# Patient Record
Sex: MALE | Race: WHITE | NOT HISPANIC OR LATINO | Employment: FULL TIME | URBAN - METROPOLITAN AREA
[De-identification: names, ages, dates, MRNs, and addresses within clinical notes are randomized per-mention and may not be internally consistent; named-entity substitution may affect disease eponyms.]

---

## 2019-04-24 ENCOUNTER — HOSPITAL ENCOUNTER (OUTPATIENT)
Dept: FAMILY MEDICINE CLINIC | Facility: CLINIC | Age: 64
Setting detail: SPECIMEN
Discharge: HOME | End: 2019-04-24
Attending: NURSE PRACTITIONER | Admitting: NURSE PRACTITIONER

## 2019-04-24 LAB
ALBUMIN SERPL-MCNC: 3.7 G/DL (ref 3.5–4.8)
ALBUMIN/GLOB SERPL: 1.1 {RATIO} (ref 1–1.7)
ALP SERPL-CCNC: 59 IU/L (ref 32–91)
ALT SERPL-CCNC: 38 IU/L (ref 17–63)
ANION GAP SERPL CALC-SCNC: 14.4 MMOL/L (ref 10–20)
AST SERPL-CCNC: 33 IU/L (ref 15–41)
BILIRUB SERPL-MCNC: 0.5 MG/DL (ref 0.3–1.2)
BUN SERPL-MCNC: 13 MG/DL (ref 8–20)
BUN/CREAT SERPL: 11.8 (ref 6.2–20.3)
CALCIUM SERPL-MCNC: 9.2 MG/DL (ref 8.9–10.3)
CHLORIDE SERPL-SCNC: 104 MMOL/L (ref 101–111)
CHOLEST SERPL-MCNC: 218 MG/DL
CHOLEST/HDLC SERPL: 4.9 {RATIO}
CONV CO2: 24 MMOL/L (ref 22–32)
CONV LDL CHOLESTEROL DIRECT: 149 MG/DL (ref 0–100)
CONV TOTAL PROTEIN: 7.2 G/DL (ref 6.1–7.9)
CREAT UR-MCNC: 1.1 MG/DL (ref 0.7–1.2)
GLOBULIN UR ELPH-MCNC: 3.5 G/DL (ref 2.5–3.8)
GLUCOSE SERPL-MCNC: 97 MG/DL (ref 65–99)
HDLC SERPL-MCNC: 45 MG/DL
LDLC/HDLC SERPL: 3.3 {RATIO}
LIPID INTERPRETATION: ABNORMAL
POTASSIUM SERPL-SCNC: 4.4 MMOL/L (ref 3.6–5.1)
SODIUM SERPL-SCNC: 138 MMOL/L (ref 136–144)
TRIGL SERPL-MCNC: 179 MG/DL
VLDLC SERPL CALC-MCNC: 24.4 MG/DL

## 2019-06-28 ENCOUNTER — ON CAMPUS - OUTPATIENT (AMBULATORY)
Dept: URBAN - METROPOLITAN AREA HOSPITAL 2 | Facility: HOSPITAL | Age: 64
End: 2019-06-28

## 2019-06-28 ENCOUNTER — OFFICE (AMBULATORY)
Dept: URBAN - METROPOLITAN AREA PATHOLOGY 4 | Facility: PATHOLOGY | Age: 64
End: 2019-06-28

## 2019-06-28 VITALS
HEART RATE: 82 BPM | HEART RATE: 81 BPM | WEIGHT: 178 LBS | OXYGEN SATURATION: 95 % | DIASTOLIC BLOOD PRESSURE: 87 MMHG | HEIGHT: 66 IN | RESPIRATION RATE: 16 BRPM | DIASTOLIC BLOOD PRESSURE: 81 MMHG | DIASTOLIC BLOOD PRESSURE: 102 MMHG | DIASTOLIC BLOOD PRESSURE: 65 MMHG | SYSTOLIC BLOOD PRESSURE: 128 MMHG | SYSTOLIC BLOOD PRESSURE: 109 MMHG | RESPIRATION RATE: 18 BRPM | HEART RATE: 72 BPM | TEMPERATURE: 97.4 F | DIASTOLIC BLOOD PRESSURE: 71 MMHG | HEART RATE: 70 BPM | DIASTOLIC BLOOD PRESSURE: 80 MMHG | SYSTOLIC BLOOD PRESSURE: 136 MMHG | SYSTOLIC BLOOD PRESSURE: 130 MMHG | HEART RATE: 80 BPM | DIASTOLIC BLOOD PRESSURE: 91 MMHG | SYSTOLIC BLOOD PRESSURE: 126 MMHG | OXYGEN SATURATION: 98 % | HEART RATE: 75 BPM | HEART RATE: 66 BPM | SYSTOLIC BLOOD PRESSURE: 153 MMHG | SYSTOLIC BLOOD PRESSURE: 146 MMHG | DIASTOLIC BLOOD PRESSURE: 73 MMHG | SYSTOLIC BLOOD PRESSURE: 151 MMHG | OXYGEN SATURATION: 97 % | HEART RATE: 65 BPM | OXYGEN SATURATION: 96 %

## 2019-06-28 DIAGNOSIS — D12.3 BENIGN NEOPLASM OF TRANSVERSE COLON: ICD-10-CM

## 2019-06-28 DIAGNOSIS — Z86.010 PERSONAL HISTORY OF COLONIC POLYPS: ICD-10-CM

## 2019-06-28 DIAGNOSIS — Z85.038 PERSONAL HISTORY OF OTHER MALIGNANT NEOPLASM OF LARGE INTEST: ICD-10-CM

## 2019-06-28 DIAGNOSIS — D12.2 BENIGN NEOPLASM OF ASCENDING COLON: ICD-10-CM

## 2019-06-28 LAB
GI HISTOLOGY: A. UNSPECIFIED: (no result)
GI HISTOLOGY: B. UNSPECIFIED: (no result)
GI HISTOLOGY: PDF REPORT: (no result)

## 2019-06-28 PROCEDURE — 45385 COLONOSCOPY W/LESION REMOVAL: CPT | Mod: 33 | Performed by: INTERNAL MEDICINE

## 2019-06-28 PROCEDURE — 88305 TISSUE EXAM BY PATHOLOGIST: CPT | Mod: 33 | Performed by: INTERNAL MEDICINE

## 2019-07-01 PROBLEM — Z86.010 HISTORY OF COLONIC POLYPS: Status: ACTIVE | Noted: 2019-04-24

## 2019-07-01 PROBLEM — Z86.0100 HISTORY OF COLONIC POLYPS: Status: ACTIVE | Noted: 2019-04-24

## 2019-07-01 PROBLEM — N40.2 PROSTATE NODULE: Status: ACTIVE | Noted: 2018-01-12

## 2019-07-01 PROBLEM — Z23 ENCOUNTER FOR IMMUNIZATION: Status: ACTIVE | Noted: 2019-04-24

## 2019-07-01 PROBLEM — E78.5 HYPERLIPIDEMIA: Status: ACTIVE | Noted: 2019-07-01

## 2019-07-01 RX ORDER — LOVASTATIN 40 MG/1
TABLET ORAL EVERY 12 HOURS
COMMUNITY
Start: 2019-02-06 | End: 2020-02-24

## 2019-07-01 RX ORDER — FLUTICASONE PROPIONATE 50 MCG
SPRAY, SUSPENSION (ML) NASAL
COMMUNITY
Start: 2019-03-26 | End: 2020-04-29 | Stop reason: SDUPTHER

## 2019-07-01 RX ORDER — MULTIVIT-MINERALS/FA/LYCOPENE 0.4 MG-6
TABLET ORAL
COMMUNITY
Start: 2016-09-21

## 2019-10-30 ENCOUNTER — APPOINTMENT (OUTPATIENT)
Dept: LAB | Facility: HOSPITAL | Age: 64
End: 2019-10-30

## 2019-10-30 ENCOUNTER — OFFICE VISIT (OUTPATIENT)
Dept: FAMILY MEDICINE CLINIC | Facility: CLINIC | Age: 64
End: 2019-10-30

## 2019-10-30 VITALS
HEIGHT: 66 IN | HEART RATE: 74 BPM | DIASTOLIC BLOOD PRESSURE: 83 MMHG | TEMPERATURE: 97.7 F | RESPIRATION RATE: 20 BRPM | OXYGEN SATURATION: 97 % | BODY MASS INDEX: 29.65 KG/M2 | WEIGHT: 184.5 LBS | SYSTOLIC BLOOD PRESSURE: 151 MMHG

## 2019-10-30 DIAGNOSIS — E55.9 VITAMIN D DEFICIENCY: ICD-10-CM

## 2019-10-30 DIAGNOSIS — N40.2 PROSTATE NODULE: ICD-10-CM

## 2019-10-30 DIAGNOSIS — Z23 FLU VACCINE NEED: ICD-10-CM

## 2019-10-30 DIAGNOSIS — Z23 NEED FOR PROPHYLACTIC VACCINATION AND INOCULATION AGAINST INFLUENZA: ICD-10-CM

## 2019-10-30 DIAGNOSIS — E78.5 HYPERLIPIDEMIA, UNSPECIFIED HYPERLIPIDEMIA TYPE: Primary | ICD-10-CM

## 2019-10-30 LAB
25(OH)D3 SERPL-MCNC: 86.7 NG/ML (ref 30–100)
ALBUMIN SERPL-MCNC: 4.3 G/DL (ref 3.5–5.2)
ALBUMIN/GLOB SERPL: 1.6 G/DL
ALP SERPL-CCNC: 63 U/L (ref 39–117)
ALT SERPL W P-5'-P-CCNC: 39 U/L (ref 1–41)
ANION GAP SERPL CALCULATED.3IONS-SCNC: 6.3 MMOL/L (ref 5–15)
AST SERPL-CCNC: 27 U/L (ref 1–40)
BILIRUB SERPL-MCNC: 0.2 MG/DL (ref 0.2–1.2)
BUN BLD-MCNC: 13 MG/DL (ref 8–23)
BUN/CREAT SERPL: 11 (ref 7–25)
CALCIUM SPEC-SCNC: 9.3 MG/DL (ref 8.6–10.5)
CHLORIDE SERPL-SCNC: 101 MMOL/L (ref 98–107)
CHOLEST SERPL-MCNC: 213 MG/DL (ref 0–200)
CO2 SERPL-SCNC: 29.7 MMOL/L (ref 22–29)
CREAT BLD-MCNC: 1.18 MG/DL (ref 0.76–1.27)
GFR SERPL CREATININE-BSD FRML MDRD: 63 ML/MIN/1.73
GLOBULIN UR ELPH-MCNC: 2.7 GM/DL
GLUCOSE BLD-MCNC: 97 MG/DL (ref 65–99)
HDLC SERPL-MCNC: 44 MG/DL (ref 40–60)
LDLC SERPL CALC-MCNC: 129 MG/DL (ref 0–100)
LDLC/HDLC SERPL: 2.94 {RATIO}
POTASSIUM BLD-SCNC: 4.6 MMOL/L (ref 3.5–5.2)
PROT SERPL-MCNC: 7 G/DL (ref 6–8.5)
SODIUM BLD-SCNC: 137 MMOL/L (ref 136–145)
TRIGL SERPL-MCNC: 199 MG/DL (ref 0–150)
VLDLC SERPL-MCNC: 39.8 MG/DL (ref 5–40)

## 2019-10-30 PROCEDURE — 99213 OFFICE O/P EST LOW 20 MIN: CPT | Performed by: NURSE PRACTITIONER

## 2019-10-30 PROCEDURE — 90674 CCIIV4 VAC NO PRSV 0.5 ML IM: CPT | Performed by: NURSE PRACTITIONER

## 2019-10-30 PROCEDURE — 36415 COLL VENOUS BLD VENIPUNCTURE: CPT | Performed by: NURSE PRACTITIONER

## 2019-10-30 PROCEDURE — 80061 LIPID PANEL: CPT | Performed by: NURSE PRACTITIONER

## 2019-10-30 PROCEDURE — 90471 IMMUNIZATION ADMIN: CPT | Performed by: NURSE PRACTITIONER

## 2019-10-30 PROCEDURE — 82306 VITAMIN D 25 HYDROXY: CPT | Performed by: NURSE PRACTITIONER

## 2019-10-30 PROCEDURE — 80053 COMPREHEN METABOLIC PANEL: CPT | Performed by: NURSE PRACTITIONER

## 2019-10-30 NOTE — PROGRESS NOTES
Subjective   Perry Tafoya is a 64 y.o. male.     Chief Complaint   Patient presents with   • Hyperlipidemia       HPI  Here for his 6 month follow up for his chronic medical problems.     Influenza vaccine he will get today. He did get one last year.   No hospitalizations no surgery since last seen.   He said recent PSA with urologist was elevated.he will be seen in 6 months instead of one year. No family history of prostate cancer. He has history of prostate nodule that is being followed.     Hyperlipidemia; taking lovastatin 40mg once day. He denies myalgias.  4/2019  Tri 179 hdl 45 . He believes this was fasting lab.             The following portions of the patient's history were reviewed and updated as appropriate: allergies, current medications, past family history, past medical history, past social history, past surgical history and problem list.      Current Outpatient Medications:   •  ascorbic acid (VITAMIN C) 100 MG tablet,  VITAMIN C TABS, Disp: , Rfl:   •  Cholecalciferol 1000 units capsule,  VITAMIN D3 CAPS, Disp: , Rfl:   •  fluticasone (FLONASE ALLERGY RELIEF) 50 MCG/ACT nasal spray, FLONASE ALLERGY RELIEF 50 MCG/ACT SUSP, Disp: , Rfl:   •  lovastatin (MEVACOR) 40 MG tablet, Every 12 (Twelve) Hours., Disp: , Rfl:   •  Multiple Vitamins-Minerals (QC MENS DAILY MULTIVITAMIN) tablet, QC MENS DAILY MULTIVITAMIN TABS, Disp: , Rfl:     No results found for this or any previous visit (from the past 4032 hour(s)).      Review of Systems   Constitutional: Negative for chills and fever.   HENT: Negative for congestion, sinus pressure and swollen glands.    Eyes: Negative for blurred vision and pain.   Respiratory: Negative for cough and shortness of breath.    Cardiovascular: Negative for chest pain and leg swelling.   Gastrointestinal: Negative for abdominal pain, nausea and indigestion.   Endocrine: Negative for cold intolerance, heat intolerance, polydipsia, polyphagia and polyuria.  "  Skin: Negative for dry skin, rash and bruise.   Neurological: Negative for light-headedness, numbness and headache.   Psychiatric/Behavioral: Negative for dysphoric mood and stress.       Objective     /83 (BP Location: Left arm, Patient Position: Sitting, Cuff Size: Large Adult)   Pulse 74   Temp 97.7 °F (36.5 °C) (Oral)   Resp 20   Ht 167.6 cm (66\")   Wt 83.7 kg (184 lb 8 oz)   SpO2 97%   BMI 29.78 kg/m²     Physical Exam   Constitutional: He is oriented to person, place, and time. He appears well-developed and well-nourished.   HENT:   Head: Normocephalic and atraumatic.   Right Ear: External ear normal.   Left Ear: External ear normal.   Nose: Nose normal.   Mouth/Throat: Oropharynx is clear and moist. No oropharyngeal exudate.   Eyes: Conjunctivae and EOM are normal. Pupils are equal, round, and reactive to light.   Neck: Normal range of motion. Neck supple.   Cardiovascular: Normal rate, regular rhythm, normal heart sounds and intact distal pulses.   Pulmonary/Chest: Effort normal and breath sounds normal.   Abdominal: Soft. Bowel sounds are normal.   Musculoskeletal: Normal range of motion.   Neurological: He is alert and oriented to person, place, and time.   Skin: Skin is warm and dry.   Psychiatric: He has a normal mood and affect. His behavior is normal. Judgment and thought content normal.   Nursing note and vitals reviewed.        Assessment/Plan   Perry was seen today for hyperlipidemia.    Diagnoses and all orders for this visit:    Hyperlipidemia, unspecified hyperlipidemia type  -     Lipid Panel  -     Comprehensive Metabolic Panel    Flu vaccine need    Vitamin D deficiency  -     Vitamin D 25 Hydroxy    Prostate nodule      Patient Instructions   Follow up on labs.  Just check blood pressure occassionaly.         Cherelle Vila, APRN    10/30/19      "

## 2020-02-24 RX ORDER — LOVASTATIN 40 MG/1
TABLET ORAL
Qty: 90 TABLET | Refills: 0 | Status: SHIPPED | OUTPATIENT
Start: 2020-02-24 | End: 2020-04-29 | Stop reason: SDUPTHER

## 2020-04-29 ENCOUNTER — APPOINTMENT (OUTPATIENT)
Dept: LAB | Facility: HOSPITAL | Age: 65
End: 2020-04-29

## 2020-04-29 ENCOUNTER — OFFICE VISIT (OUTPATIENT)
Dept: FAMILY MEDICINE CLINIC | Facility: CLINIC | Age: 65
End: 2020-04-29

## 2020-04-29 VITALS
SYSTOLIC BLOOD PRESSURE: 131 MMHG | RESPIRATION RATE: 18 BRPM | BODY MASS INDEX: 27.47 KG/M2 | HEIGHT: 66 IN | TEMPERATURE: 97 F | DIASTOLIC BLOOD PRESSURE: 76 MMHG | WEIGHT: 170.9 LBS | HEART RATE: 75 BPM | OXYGEN SATURATION: 97 %

## 2020-04-29 DIAGNOSIS — E78.5 HYPERLIPIDEMIA, UNSPECIFIED HYPERLIPIDEMIA TYPE: Primary | ICD-10-CM

## 2020-04-29 DIAGNOSIS — Z87.898 HISTORY OF ELEVATED PSA: ICD-10-CM

## 2020-04-29 DIAGNOSIS — M10.9 ACUTE GOUT INVOLVING TOE OF RIGHT FOOT, UNSPECIFIED CAUSE: ICD-10-CM

## 2020-04-29 DIAGNOSIS — E55.9 VITAMIN D DEFICIENCY: ICD-10-CM

## 2020-04-29 DIAGNOSIS — R53.1 WEAKNESS: ICD-10-CM

## 2020-04-29 LAB
25(OH)D3 SERPL-MCNC: 56.4 NG/ML (ref 30–100)
ALBUMIN SERPL-MCNC: 4.3 G/DL (ref 3.5–5.2)
ALBUMIN/GLOB SERPL: 1.2 G/DL
ALP SERPL-CCNC: 64 U/L (ref 39–117)
ALT SERPL W P-5'-P-CCNC: 25 U/L (ref 1–41)
ANION GAP SERPL CALCULATED.3IONS-SCNC: 13.7 MMOL/L (ref 5–15)
AST SERPL-CCNC: 25 U/L (ref 1–40)
BILIRUB SERPL-MCNC: 0.4 MG/DL (ref 0.2–1.2)
BUN BLD-MCNC: 16 MG/DL (ref 8–23)
BUN/CREAT SERPL: 13.4 (ref 7–25)
CALCIUM SPEC-SCNC: 9.7 MG/DL (ref 8.6–10.5)
CHLORIDE SERPL-SCNC: 101 MMOL/L (ref 98–107)
CHOLEST SERPL-MCNC: 225 MG/DL (ref 0–200)
CO2 SERPL-SCNC: 26.3 MMOL/L (ref 22–29)
CREAT BLD-MCNC: 1.19 MG/DL (ref 0.76–1.27)
GFR SERPL CREATININE-BSD FRML MDRD: 62 ML/MIN/1.73
GLOBULIN UR ELPH-MCNC: 3.6 GM/DL
GLUCOSE BLD-MCNC: 106 MG/DL (ref 65–99)
HDLC SERPL-MCNC: 47 MG/DL (ref 40–60)
LDLC SERPL CALC-MCNC: 143 MG/DL (ref 0–100)
LDLC/HDLC SERPL: 3.03 {RATIO}
POTASSIUM BLD-SCNC: 4.8 MMOL/L (ref 3.5–5.2)
PROT SERPL-MCNC: 7.9 G/DL (ref 6–8.5)
SODIUM BLD-SCNC: 141 MMOL/L (ref 136–145)
TRIGL SERPL-MCNC: 177 MG/DL (ref 0–150)
VLDLC SERPL-MCNC: 35.4 MG/DL (ref 5–40)

## 2020-04-29 PROCEDURE — 80061 LIPID PANEL: CPT | Performed by: NURSE PRACTITIONER

## 2020-04-29 PROCEDURE — 82306 VITAMIN D 25 HYDROXY: CPT | Performed by: NURSE PRACTITIONER

## 2020-04-29 PROCEDURE — 99214 OFFICE O/P EST MOD 30 MIN: CPT | Performed by: NURSE PRACTITIONER

## 2020-04-29 PROCEDURE — 80053 COMPREHEN METABOLIC PANEL: CPT | Performed by: NURSE PRACTITIONER

## 2020-04-29 PROCEDURE — 36415 COLL VENOUS BLD VENIPUNCTURE: CPT | Performed by: NURSE PRACTITIONER

## 2020-04-29 RX ORDER — CHLORAL HYDRATE 500 MG
1000 CAPSULE ORAL
COMMUNITY

## 2020-04-29 RX ORDER — FLUTICASONE PROPIONATE 50 MCG
2 SPRAY, SUSPENSION (ML) NASAL DAILY
Qty: 1 BOTTLE | Refills: 3 | Status: SHIPPED | OUTPATIENT
Start: 2020-04-29

## 2020-04-29 RX ORDER — LOVASTATIN 40 MG/1
40 TABLET ORAL NIGHTLY
Qty: 90 TABLET | Refills: 1 | Status: SHIPPED | OUTPATIENT
Start: 2020-04-29 | End: 2020-12-04

## 2020-04-29 NOTE — PATIENT INSTRUCTIONS
Continue current medications follow up on labs.  Avoid a lot of pork  Can take ibuprofen or aleve for the toe pain.   Eat in am when you wake. If weakness continues contact office.

## 2020-04-29 NOTE — PROGRESS NOTES
Subjective   Perry Tafoya is a 64 y.o. male.     Chief Complaint   Patient presents with   • Hyperlipidemia     6 MTH F/U, blood pressure seems elevated       HPI  Patient is here for management of his hyperlipidemia vit d def,  and new problem side of right big toe hurts off and on sometimes 2-3 times a week. This has been for 6 mnths. New problem: doesn't feel right in am when he wakes up until he eats. Wonders if is blood pressure.    Hyperlipidemia: taking lovastatin 40 mg once at night. 10/2019  h tri 199 h hdl 44 normal  high. Denies any myalgias.     Vid d deficency; last labs vit d was 87 normal. He was taking large dose of vit d was confused about proper dose. Not taking vit d until last few weeks restarted due to the covid virus heard would help.     Right big toe pain: discussed could be gout. Has been going on 6 months. No injury no redness reported. He has never had gout. discribes burning sensation in to the medial side toe.     Weakness in am upon wakening: he reports started when he stopped working 3/31 staying in bed longer and doesn't eat. Used to eat banana and drink orange juice. Denies chest pain. Checks blood pressure and sometimes is little high. Not short of air with activity.   Denies any weakness today. Has eaten.     Elevated PSA- he is followed by urology. He has had elevated PSA in past had neg biopsy. Recent testing was normal PSA.     The following portions of the patient's history were reviewed and updated as appropriate: allergies, current medications, past family history, past medical history, past social history, past surgical history and problem list.      Current Outpatient Medications:   •  ascorbic acid (VITAMIN C) 100 MG tablet,  VITAMIN C TABS, Disp: , Rfl:   •  Cholecalciferol 1000 units capsule,  VITAMIN D3 CAPS, Disp: , Rfl:   •  fluticasone (Flonase Allergy Relief) 50 MCG/ACT nasal spray, 2 sprays into the nostril(s) as directed by provider Daily.,  "Disp: 1 bottle, Rfl: 3  •  lovastatin (MEVACOR) 40 MG tablet, Take 1 tablet by mouth Every Night., Disp: 90 tablet, Rfl: 1  •  Multiple Vitamins-Minerals (QC MENS DAILY MULTIVITAMIN) tablet, QC MENS DAILY MULTIVITAMIN TABS, Disp: , Rfl:   •  Omega-3 Fatty Acids (FISH OIL) 1000 MG capsule capsule, Take 1,000 mg by mouth Daily With Breakfast., Disp: , Rfl:     No results found for this or any previous visit (from the past 4032 hour(s)).      Review of Systems   Constitutional: Negative for fever.   HENT: Negative.  Negative for postnasal drip, sinus pressure and sore throat.    Eyes: Negative.    Respiratory: Negative for cough.    Cardiovascular: Negative for chest pain, palpitations and leg swelling.   Gastrointestinal: Negative for abdominal pain, blood in stool, constipation and diarrhea.   Genitourinary: Negative for dysuria and frequency.   Musculoskeletal:        Left great toe pain off and on.    Neurological: Positive for weakness. Negative for light-headedness and headache.   Hematological: Negative.    Psychiatric/Behavioral: Negative for depressed mood. The patient is not nervous/anxious.        Objective     /76 (BP Location: Left arm, Patient Position: Sitting, Cuff Size: Adult)   Pulse 75   Temp 97 °F (36.1 °C) (Temporal)   Resp 18   Ht 167.6 cm (66\")   Wt 77.5 kg (170 lb 14.4 oz)   SpO2 97%   BMI 27.58 kg/m²     Physical Exam   Constitutional: He is oriented to person, place, and time. He appears well-developed and well-nourished.   HENT:   Head: Normocephalic and atraumatic.   Right Ear: External ear normal.   Left Ear: External ear normal.   Nose: Nose normal.   Mouth/Throat: Oropharynx is clear and moist.   Eyes: Pupils are equal, round, and reactive to light. Conjunctivae are normal.   Neck: Normal range of motion.   Cardiovascular: Normal rate, regular rhythm, normal heart sounds and intact distal pulses.   Pulmonary/Chest: Effort normal and breath sounds normal.   Abdominal: Soft. " Bowel sounds are normal. He exhibits no mass. There is no tenderness. There is no guarding.   Musculoskeletal: Normal range of motion.   Neurological: He is alert and oriented to person, place, and time.   Skin: Skin is warm and dry.   Psychiatric: He has a normal mood and affect. His behavior is normal. Judgment and thought content normal.   Nursing note and vitals reviewed.        Assessment/Plan   Perry was seen today for hyperlipidemia.    Diagnoses and all orders for this visit:    Hyperlipidemia, unspecified hyperlipidemia type  Comments:  continue current medications.   Orders:  -     Comprehensive Metabolic Panel  -     Lipid Panel    Weakness  Comments:  may be low blood sugar. he is to eat in am.     Acute gout involving toe of right foot, unspecified cause  Comments:  discussed gout as possibility. discussed foods to avoid.     Vitamin D deficiency  Comments:  will check current labs  Orders:  -     Vitamin D 25 Hydroxy    History of elevated PSA    Other orders  -     lovastatin (MEVACOR) 40 MG tablet; Take 1 tablet by mouth Every Night.  -     fluticasone (Flonase Allergy Relief) 50 MCG/ACT nasal spray; 2 sprays into the nostril(s) as directed by provider Daily.      Patient Instructions   Continue current medications follow up on labs.  Avoid a lot of pork  Can take ibuprofen or aleve for the toe pain.         Cherelle Vila, APRN    04/29/20

## 2020-10-28 ENCOUNTER — LAB (OUTPATIENT)
Dept: LAB | Facility: HOSPITAL | Age: 65
End: 2020-10-28

## 2020-10-28 ENCOUNTER — OFFICE VISIT (OUTPATIENT)
Dept: FAMILY MEDICINE CLINIC | Facility: CLINIC | Age: 65
End: 2020-10-28

## 2020-10-28 VITALS
WEIGHT: 175.6 LBS | HEART RATE: 71 BPM | DIASTOLIC BLOOD PRESSURE: 92 MMHG | BODY MASS INDEX: 28.22 KG/M2 | TEMPERATURE: 97.1 F | RESPIRATION RATE: 16 BRPM | SYSTOLIC BLOOD PRESSURE: 152 MMHG | OXYGEN SATURATION: 98 % | HEIGHT: 66 IN

## 2020-10-28 DIAGNOSIS — Z87.898 HISTORY OF ELEVATED PSA: ICD-10-CM

## 2020-10-28 DIAGNOSIS — E55.9 VITAMIN D DEFICIENCY: ICD-10-CM

## 2020-10-28 DIAGNOSIS — E78.5 HYPERLIPIDEMIA, UNSPECIFIED HYPERLIPIDEMIA TYPE: ICD-10-CM

## 2020-10-28 DIAGNOSIS — E78.5 HYPERLIPIDEMIA, UNSPECIFIED HYPERLIPIDEMIA TYPE: Primary | ICD-10-CM

## 2020-10-28 DIAGNOSIS — I15.9 SECONDARY HYPERTENSION: ICD-10-CM

## 2020-10-28 LAB
25(OH)D3 SERPL-MCNC: 52.4 NG/ML (ref 30–100)
ALBUMIN SERPL-MCNC: 4.5 G/DL (ref 3.5–5.2)
ALBUMIN/GLOB SERPL: 1.6 G/DL
ALP SERPL-CCNC: 67 U/L (ref 39–117)
ALT SERPL W P-5'-P-CCNC: 33 U/L (ref 1–41)
ANION GAP SERPL CALCULATED.3IONS-SCNC: 8 MMOL/L (ref 5–15)
AST SERPL-CCNC: 34 U/L (ref 1–40)
BASOPHILS # BLD AUTO: 0.03 10*3/MM3 (ref 0–0.2)
BASOPHILS NFR BLD AUTO: 0.5 % (ref 0–1.5)
BILIRUB SERPL-MCNC: 0.4 MG/DL (ref 0–1.2)
BUN SERPL-MCNC: 15 MG/DL (ref 8–23)
BUN/CREAT SERPL: 15.8 (ref 7–25)
CALCIUM SPEC-SCNC: 9.8 MG/DL (ref 8.6–10.5)
CHLORIDE SERPL-SCNC: 104 MMOL/L (ref 98–107)
CHOLEST SERPL-MCNC: 199 MG/DL (ref 0–200)
CO2 SERPL-SCNC: 28 MMOL/L (ref 22–29)
CREAT SERPL-MCNC: 0.95 MG/DL (ref 0.76–1.27)
DEPRECATED RDW RBC AUTO: 45.3 FL (ref 37–54)
EOSINOPHIL # BLD AUTO: 0.17 10*3/MM3 (ref 0–0.4)
EOSINOPHIL NFR BLD AUTO: 2.8 % (ref 0.3–6.2)
ERYTHROCYTE [DISTWIDTH] IN BLOOD BY AUTOMATED COUNT: 13.4 % (ref 12.3–15.4)
GFR SERPL CREATININE-BSD FRML MDRD: 80 ML/MIN/1.73
GLOBULIN UR ELPH-MCNC: 2.8 GM/DL
GLUCOSE SERPL-MCNC: 95 MG/DL (ref 65–99)
HCT VFR BLD AUTO: 42.1 % (ref 37.5–51)
HDLC SERPL-MCNC: 47 MG/DL (ref 40–60)
HGB BLD-MCNC: 14.6 G/DL (ref 13–17.7)
IMM GRANULOCYTES # BLD AUTO: 0.01 10*3/MM3 (ref 0–0.05)
IMM GRANULOCYTES NFR BLD AUTO: 0.2 % (ref 0–0.5)
LDLC SERPL CALC-MCNC: 127 MG/DL (ref 0–100)
LDLC/HDLC SERPL: 2.63 {RATIO}
LYMPHOCYTES # BLD AUTO: 1.91 10*3/MM3 (ref 0.7–3.1)
LYMPHOCYTES NFR BLD AUTO: 31.5 % (ref 19.6–45.3)
MCH RBC QN AUTO: 32.3 PG (ref 26.6–33)
MCHC RBC AUTO-ENTMCNC: 34.7 G/DL (ref 31.5–35.7)
MCV RBC AUTO: 93.1 FL (ref 79–97)
MONOCYTES # BLD AUTO: 0.58 10*3/MM3 (ref 0.1–0.9)
MONOCYTES NFR BLD AUTO: 9.6 % (ref 5–12)
NEUTROPHILS NFR BLD AUTO: 3.36 10*3/MM3 (ref 1.7–7)
NEUTROPHILS NFR BLD AUTO: 55.4 % (ref 42.7–76)
NRBC BLD AUTO-RTO: 0 /100 WBC (ref 0–0.2)
PLATELET # BLD AUTO: 346 10*3/MM3 (ref 140–450)
PMV BLD AUTO: 10.1 FL (ref 6–12)
POTASSIUM SERPL-SCNC: 4.3 MMOL/L (ref 3.5–5.2)
PROT SERPL-MCNC: 7.3 G/DL (ref 6–8.5)
RBC # BLD AUTO: 4.52 10*6/MM3 (ref 4.14–5.8)
SODIUM SERPL-SCNC: 140 MMOL/L (ref 136–145)
TRIGL SERPL-MCNC: 141 MG/DL (ref 0–150)
VLDLC SERPL-MCNC: 25 MG/DL (ref 5–40)
WBC # BLD AUTO: 6.06 10*3/MM3 (ref 3.4–10.8)

## 2020-10-28 PROCEDURE — 82306 VITAMIN D 25 HYDROXY: CPT

## 2020-10-28 PROCEDURE — 80053 COMPREHEN METABOLIC PANEL: CPT

## 2020-10-28 PROCEDURE — 80061 LIPID PANEL: CPT

## 2020-10-28 PROCEDURE — 85025 COMPLETE CBC W/AUTO DIFF WBC: CPT

## 2020-10-28 PROCEDURE — 36415 COLL VENOUS BLD VENIPUNCTURE: CPT

## 2020-10-28 PROCEDURE — 99214 OFFICE O/P EST MOD 30 MIN: CPT | Performed by: NURSE PRACTITIONER

## 2020-10-28 RX ORDER — HYDROCHLOROTHIAZIDE 12.5 MG/1
12.5 TABLET ORAL DAILY
Qty: 90 TABLET | Refills: 0 | Status: SHIPPED | OUTPATIENT
Start: 2020-10-28 | End: 2020-11-24

## 2020-10-28 RX ORDER — HYDROCHLOROTHIAZIDE 12.5 MG/1
12.5 TABLET ORAL DAILY
Qty: 30 TABLET | Refills: 0 | Status: SHIPPED | OUTPATIENT
Start: 2020-10-28 | End: 2020-10-28

## 2020-10-28 NOTE — PROGRESS NOTES
Subjective   Perry Tafoya is a 65 y.o. male.     Chief Complaint   Patient presents with   • Hyperlipidemia     6 month follow up    • Vitamin D Deficiency   • Dizziness     Cannot walk. Stumbles.Says happens occasionally.        HPI  Patient is here for management of his chronic medical problems hyperlipidemia,vit d def, prostate nodule with elevated PSA followed by urology., new problem once year he gets dizzy when he walks only lasts for one min.     Dizzy: for past 3 years once year he will be walking and get dizzy only lasts less one minute.usually in spring. He feels coming on then gone. He staggers around like drunk and does not drink. When gone no other effects. No head injury. Does not have htn.       Hyperlididemia: taking lovasatin 40mgonce day and fish oil 1000 mg with breakfast. 4/2020 tC 225 high tri 177 high, HDL 47 norm  high .    Vit d deficency: 4/2020 normal level 56.4 takes 1000 IU daily.     Prostate nodule /'elevated PSA- followed by urology.   Last PSA 0 last year he reports.   No back pain , only up 2 times a night to urinate.     Colon tumor:he gets colonoscopy every 5 years. Last one 1 1/2 year ago.followed by gastroenterology.    Elevated blood pressure: synopsis has had several elevated blood pressure readings over several years.   He does not check.    The following portions of the patient's history were reviewed and updated as appropriate: allergies, current medications, past family history, past medical history, past social history, past surgical history and problem list.      Current Outpatient Medications:   •  ascorbic acid (VITAMIN C) 100 MG tablet,  VITAMIN C TABS, Disp: , Rfl:   •  Cholecalciferol 1000 units capsule,  VITAMIN D3 CAPS, Disp: , Rfl:   •  fluticasone (Flonase Allergy Relief) 50 MCG/ACT nasal spray, 2 sprays into the nostril(s) as directed by provider Daily., Disp: 1 bottle, Rfl: 3  •  lovastatin (MEVACOR) 40 MG tablet, Take 1 tablet by mouth Every  "Night., Disp: 90 tablet, Rfl: 1  •  Multiple Vitamins-Minerals (QC MENS DAILY MULTIVITAMIN) tablet, QC MENS DAILY MULTIVITAMIN TABS, Disp: , Rfl:   •  Omega-3 Fatty Acids (FISH OIL) 1000 MG capsule capsule, Take 1,000 mg by mouth Daily With Breakfast., Disp: , Rfl:   •  hydroCHLOROthiazide (HYDRODIURIL) 12.5 MG tablet, Take 1 tablet by mouth Daily., Disp: 30 tablet, Rfl: 0    No results found for this or any previous visit (from the past 4032 hour(s)).      Review of Systems   Constitutional: Negative for fever.   HENT: Negative for nosebleeds, sinus pressure and sore throat.    Eyes:        Last eye exam 20 years not color blind no blurred vision   Cardiovascular: Negative for chest pain, palpitations and leg swelling.   Endocrine: Negative.    Genitourinary: Positive for nocturia.        2 times a night he reports   Neurological: Positive for dizziness.   Hematological: Negative.    Psychiatric/Behavioral: Negative for depressed mood. The patient is not nervous/anxious.        Objective     /92   Pulse 71   Temp 97.1 °F (36.2 °C) (Temporal)   Resp 16   Ht 167.6 cm (66\")   Wt 79.7 kg (175 lb 9.6 oz)   SpO2 98%   BMI 28.34 kg/m²     Physical Exam  Constitutional:       General: He is not in acute distress.     Appearance: Normal appearance. He is normal weight. He is not ill-appearing.   HENT:      Head: Normocephalic.      Right Ear: Tympanic membrane normal. There is no impacted cerumen.      Left Ear: Tympanic membrane normal. There is no impacted cerumen.      Nose: Nose normal. No rhinorrhea.      Mouth/Throat:      Mouth: Mucous membranes are moist.      Pharynx: Oropharynx is clear. No oropharyngeal exudate or posterior oropharyngeal erythema.   Eyes:      General: No scleral icterus.     Conjunctiva/sclera: Conjunctivae normal.      Pupils: Pupils are equal, round, and reactive to light.   Neck:      Vascular: No carotid bruit.   Cardiovascular:      Rate and Rhythm: Normal rate and regular " rhythm.      Pulses: Normal pulses.      Heart sounds: Normal heart sounds. No murmur. No friction rub. No gallop.    Pulmonary:      Effort: Pulmonary effort is normal.      Breath sounds: Normal breath sounds.   Chest:      Chest wall: No tenderness.   Abdominal:      General: Abdomen is flat. Bowel sounds are normal.      Palpations: Abdomen is soft. There is no mass.      Tenderness: There is no abdominal tenderness. There is no guarding or rebound.   Musculoskeletal:         General: No swelling or tenderness.      Right lower leg: No edema.      Left lower leg: No edema.   Skin:     General: Skin is warm and dry.      Capillary Refill: Capillary refill takes less than 2 seconds.      Findings: No lesion or rash.   Neurological:      Mental Status: He is alert and oriented to person, place, and time.      Motor: No weakness.   Psychiatric:         Mood and Affect: Mood normal.         Behavior: Behavior normal.           Assessment/Plan   Diagnoses and all orders for this visit:    1. Hyperlipidemia, unspecified hyperlipidemia type (Primary)  Comments:  labs ordered  Orders:  -     Lipid Panel; Future  -     Comprehensive Metabolic Panel; Future    2. Vitamin D deficiency  Comments:  labs ordered  Orders:  -     Vitamin D 25 Hydroxy; Future    3. History of elevated PSA  Comments:  stable followed by urology    4. Secondary hypertension  Comments:  start hctz    Other orders  -     hydroCHLOROthiazide (HYDRODIURIL) 12.5 MG tablet; Take 1 tablet by mouth Daily.  Dispense: 30 tablet; Refill: 0      Patient Instructions   Start hctz for blood pressure. Check blood pressure 3-4 times a week for month write down bring to next appointment      Cherelle Vila, SHABBIR    10/28/20

## 2020-10-28 NOTE — PATIENT INSTRUCTIONS
Start hctz for blood pressure. Check blood pressure 3-4 times a week for month write down bring to next appointment

## 2020-10-30 ENCOUNTER — TELEPHONE (OUTPATIENT)
Dept: FAMILY MEDICINE CLINIC | Facility: CLINIC | Age: 65
End: 2020-10-30

## 2020-11-24 RX ORDER — HYDROCHLOROTHIAZIDE 12.5 MG/1
12.5 TABLET ORAL DAILY
Qty: 90 TABLET | Refills: 0 | Status: SHIPPED | OUTPATIENT
Start: 2020-11-24 | End: 2020-11-25 | Stop reason: SDUPTHER

## 2020-11-25 ENCOUNTER — OFFICE VISIT (OUTPATIENT)
Dept: FAMILY MEDICINE CLINIC | Facility: CLINIC | Age: 65
End: 2020-11-25

## 2020-11-25 VITALS
HEIGHT: 66 IN | OXYGEN SATURATION: 97 % | RESPIRATION RATE: 16 BRPM | WEIGHT: 172.8 LBS | SYSTOLIC BLOOD PRESSURE: 142 MMHG | TEMPERATURE: 97.1 F | DIASTOLIC BLOOD PRESSURE: 89 MMHG | BODY MASS INDEX: 27.77 KG/M2 | HEART RATE: 70 BPM

## 2020-11-25 DIAGNOSIS — I15.9 SECONDARY HYPERTENSION: Primary | ICD-10-CM

## 2020-11-25 PROCEDURE — 99213 OFFICE O/P EST LOW 20 MIN: CPT | Performed by: NURSE PRACTITIONER

## 2020-11-25 RX ORDER — HYDROCHLOROTHIAZIDE 12.5 MG/1
12.5 TABLET ORAL DAILY
Qty: 90 TABLET | Refills: 1 | Status: SHIPPED | OUTPATIENT
Start: 2020-11-25 | End: 2021-02-22 | Stop reason: SDUPTHER

## 2020-11-25 NOTE — PROGRESS NOTES
Subjective   Perry Tafoya is a 65 y.o. male.     Chief Complaint   Patient presents with   • Hypertension     One month follow up   • Hyperlipidemia   • Gout       HPI  Patient is here for follow up on his hypertension: he started taking hctz in am. He has been checking blood pressures and no longer getting high numbers.   Has been running 126/65 124/67 114/63 126/73 114/77 117/66 129/71. He is no longer having the dizziness.     The following portions of the patient's history were reviewed and updated as appropriate: allergies, current medications, past family history, past medical history, past social history, past surgical history and problem list.      Current Outpatient Medications:   •  ascorbic acid (VITAMIN C) 100 MG tablet,  VITAMIN C TABS, Disp: , Rfl:   •  Cholecalciferol 1000 units capsule,  VITAMIN D3 CAPS, Disp: , Rfl:   •  fluticasone (Flonase Allergy Relief) 50 MCG/ACT nasal spray, 2 sprays into the nostril(s) as directed by provider Daily., Disp: 1 bottle, Rfl: 3  •  hydroCHLOROthiazide (HYDRODIURIL) 12.5 MG tablet, Take 1 tablet by mouth Daily., Disp: 90 tablet, Rfl: 1  •  lovastatin (MEVACOR) 40 MG tablet, Take 1 tablet by mouth Every Night., Disp: 90 tablet, Rfl: 1  •  Multiple Vitamins-Minerals (QC MENS DAILY MULTIVITAMIN) tablet, QC MENS DAILY MULTIVITAMIN TABS, Disp: , Rfl:   •  Omega-3 Fatty Acids (FISH OIL) 1000 MG capsule capsule, Take 1,000 mg by mouth Daily With Breakfast., Disp: , Rfl:     Recent Results (from the past 4032 hour(s))   Lipid Panel    Collection Time: 10/28/20 11:35 AM    Specimen: Blood   Result Value Ref Range    Total Cholesterol 199 0 - 200 mg/dL    Triglycerides 141 0 - 150 mg/dL    HDL Cholesterol 47 40 - 60 mg/dL    LDL Cholesterol  127 (H) 0 - 100 mg/dL    VLDL Cholesterol 25 5 - 40 mg/dL    LDL/HDL Ratio 2.63    Comprehensive Metabolic Panel    Collection Time: 10/28/20 11:35 AM    Specimen: Blood   Result Value Ref Range    Glucose 95 65 - 99 mg/dL    BUN  15 8 - 23 mg/dL    Creatinine 0.95 0.76 - 1.27 mg/dL    Sodium 140 136 - 145 mmol/L    Potassium 4.3 3.5 - 5.2 mmol/L    Chloride 104 98 - 107 mmol/L    CO2 28.0 22.0 - 29.0 mmol/L    Calcium 9.8 8.6 - 10.5 mg/dL    Total Protein 7.3 6.0 - 8.5 g/dL    Albumin 4.50 3.50 - 5.20 g/dL    ALT (SGPT) 33 1 - 41 U/L    AST (SGOT) 34 1 - 40 U/L    Alkaline Phosphatase 67 39 - 117 U/L    Total Bilirubin 0.4 0.0 - 1.2 mg/dL    eGFR Non African Amer 80 >60 mL/min/1.73    Globulin 2.8 gm/dL    A/G Ratio 1.6 g/dL    BUN/Creatinine Ratio 15.8 7.0 - 25.0    Anion Gap 8.0 5.0 - 15.0 mmol/L   Vitamin D 25 Hydroxy    Collection Time: 10/28/20 11:35 AM    Specimen: Blood   Result Value Ref Range    25 Hydroxy, Vitamin D 52.4 30.0 - 100.0 ng/ml   CBC Auto Differential    Collection Time: 10/28/20 11:35 AM    Specimen: Blood   Result Value Ref Range    WBC 6.06 3.40 - 10.80 10*3/mm3    RBC 4.52 4.14 - 5.80 10*6/mm3    Hemoglobin 14.6 13.0 - 17.7 g/dL    Hematocrit 42.1 37.5 - 51.0 %    MCV 93.1 79.0 - 97.0 fL    MCH 32.3 26.6 - 33.0 pg    MCHC 34.7 31.5 - 35.7 g/dL    RDW 13.4 12.3 - 15.4 %    RDW-SD 45.3 37.0 - 54.0 fl    MPV 10.1 6.0 - 12.0 fL    Platelets 346 140 - 450 10*3/mm3    Neutrophil % 55.4 42.7 - 76.0 %    Lymphocyte % 31.5 19.6 - 45.3 %    Monocyte % 9.6 5.0 - 12.0 %    Eosinophil % 2.8 0.3 - 6.2 %    Basophil % 0.5 0.0 - 1.5 %    Immature Grans % 0.2 0.0 - 0.5 %    Neutrophils, Absolute 3.36 1.70 - 7.00 10*3/mm3    Lymphocytes, Absolute 1.91 0.70 - 3.10 10*3/mm3    Monocytes, Absolute 0.58 0.10 - 0.90 10*3/mm3    Eosinophils, Absolute 0.17 0.00 - 0.40 10*3/mm3    Basophils, Absolute 0.03 0.00 - 0.20 10*3/mm3    Immature Grans, Absolute 0.01 0.00 - 0.05 10*3/mm3    nRBC 0.0 0.0 - 0.2 /100 WBC         Review of Systems   HENT: Negative.    Respiratory: Negative.    Cardiovascular: Negative.    Gastrointestinal: Negative.    Genitourinary: Negative.    Musculoskeletal: Negative.    Neurological: Negative.    Hematological:  "Negative.    Psychiatric/Behavioral: Negative.        Objective     /89 (BP Location: Right arm, Patient Position: Sitting, Cuff Size: Adult)   Pulse 70   Temp 97.1 °F (36.2 °C) (Temporal)   Resp 16   Ht 167.6 cm (66\")   Wt 78.4 kg (172 lb 12.8 oz)   SpO2 97%   BMI 27.89 kg/m²     Physical Exam  Vitals signs and nursing note reviewed.   Constitutional:       Appearance: Normal appearance.   HENT:      Head: Normocephalic and atraumatic.      Mouth/Throat:      Mouth: Mucous membranes are moist.   Neck:      Musculoskeletal: Normal range of motion.   Cardiovascular:      Rate and Rhythm: Normal rate and regular rhythm.      Pulses: Normal pulses.      Heart sounds: Normal heart sounds.   Pulmonary:      Effort: Pulmonary effort is normal.      Breath sounds: Normal breath sounds.   Abdominal:      General: Abdomen is flat. Bowel sounds are normal.      Palpations: Abdomen is soft.   Skin:     General: Skin is warm and dry.   Neurological:      General: No focal deficit present.      Mental Status: He is alert and oriented to person, place, and time.   Psychiatric:         Mood and Affect: Mood normal.         Behavior: Behavior normal.         Thought Content: Thought content normal.         Judgment: Judgment normal.           Assessment/Plan   Diagnoses and all orders for this visit:    1. Secondary hypertension (Primary)  Comments:  continue medication    Other orders  -     hydroCHLOROthiazide (HYDRODIURIL) 12.5 MG tablet; Take 1 tablet by mouth Daily.  Dispense: 90 tablet; Refill: 1      Patient Instructions   Follow up 6 months.  Continue current medications      Cherelle Vila, APRSCOTT    11/25/20      "

## 2020-12-04 RX ORDER — LOVASTATIN 40 MG/1
40 TABLET ORAL NIGHTLY
Qty: 90 TABLET | Refills: 1 | Status: SHIPPED | OUTPATIENT
Start: 2020-12-04 | End: 2021-05-26 | Stop reason: SDUPTHER

## 2021-01-19 PROCEDURE — U0003 INFECTIOUS AGENT DETECTION BY NUCLEIC ACID (DNA OR RNA); SEVERE ACUTE RESPIRATORY SYNDROME CORONAVIRUS 2 (SARS-COV-2) (CORONAVIRUS DISEASE [COVID-19]), AMPLIFIED PROBE TECHNIQUE, MAKING USE OF HIGH THROUGHPUT TECHNOLOGIES AS DESCRIBED BY CMS-2020-01-R: HCPCS | Performed by: FAMILY MEDICINE

## 2021-02-22 RX ORDER — HYDROCHLOROTHIAZIDE 12.5 MG/1
12.5 TABLET ORAL DAILY
Qty: 90 TABLET | Refills: 1 | Status: SHIPPED | OUTPATIENT
Start: 2021-02-22 | End: 2021-05-26 | Stop reason: SDUPTHER

## 2021-05-26 ENCOUNTER — LAB (OUTPATIENT)
Dept: LAB | Facility: HOSPITAL | Age: 66
End: 2021-05-26

## 2021-05-26 ENCOUNTER — OFFICE VISIT (OUTPATIENT)
Dept: FAMILY MEDICINE CLINIC | Facility: CLINIC | Age: 66
End: 2021-05-26

## 2021-05-26 ENCOUNTER — HOSPITAL ENCOUNTER (OUTPATIENT)
Dept: GENERAL RADIOLOGY | Facility: HOSPITAL | Age: 66
Discharge: HOME | End: 2021-05-26

## 2021-05-26 VITALS
SYSTOLIC BLOOD PRESSURE: 124 MMHG | OXYGEN SATURATION: 95 % | HEART RATE: 74 BPM | WEIGHT: 173 LBS | BODY MASS INDEX: 27.8 KG/M2 | TEMPERATURE: 96.9 F | DIASTOLIC BLOOD PRESSURE: 78 MMHG | HEIGHT: 66 IN

## 2021-05-26 DIAGNOSIS — M10.9 ACUTE GOUT INVOLVING TOE OF RIGHT FOOT, UNSPECIFIED CAUSE: ICD-10-CM

## 2021-05-26 DIAGNOSIS — E78.2 MIXED HYPERLIPIDEMIA: ICD-10-CM

## 2021-05-26 DIAGNOSIS — I15.9 SECONDARY HYPERTENSION: ICD-10-CM

## 2021-05-26 DIAGNOSIS — E55.9 VITAMIN D DEFICIENCY: ICD-10-CM

## 2021-05-26 DIAGNOSIS — R05.9 COUGH: ICD-10-CM

## 2021-05-26 DIAGNOSIS — L40.9 PSORIASIS: Primary | ICD-10-CM

## 2021-05-26 LAB
25(OH)D3 SERPL-MCNC: 47.6 NG/ML (ref 30–100)
ALBUMIN SERPL-MCNC: 4.5 G/DL (ref 3.5–5.2)
ALBUMIN/GLOB SERPL: 1.6 G/DL
ALP SERPL-CCNC: 70 U/L (ref 39–117)
ALT SERPL W P-5'-P-CCNC: 29 U/L (ref 1–41)
ANION GAP SERPL CALCULATED.3IONS-SCNC: 9.8 MMOL/L (ref 5–15)
AST SERPL-CCNC: 31 U/L (ref 1–40)
BASOPHILS # BLD AUTO: 0.07 10*3/MM3 (ref 0–0.2)
BASOPHILS NFR BLD AUTO: 0.9 % (ref 0–1.5)
BILIRUB SERPL-MCNC: 0.6 MG/DL (ref 0–1.2)
BUN SERPL-MCNC: 14 MG/DL (ref 8–23)
BUN/CREAT SERPL: 14 (ref 7–25)
CALCIUM SPEC-SCNC: 9.6 MG/DL (ref 8.6–10.5)
CHLORIDE SERPL-SCNC: 102 MMOL/L (ref 98–107)
CO2 SERPL-SCNC: 28.2 MMOL/L (ref 22–29)
CREAT SERPL-MCNC: 1 MG/DL (ref 0.76–1.27)
DEPRECATED RDW RBC AUTO: 46.7 FL (ref 37–54)
EOSINOPHIL # BLD AUTO: 0.29 10*3/MM3 (ref 0–0.4)
EOSINOPHIL NFR BLD AUTO: 3.9 % (ref 0.3–6.2)
ERYTHROCYTE [DISTWIDTH] IN BLOOD BY AUTOMATED COUNT: 13.1 % (ref 12.3–15.4)
GFR SERPL CREATININE-BSD FRML MDRD: 75 ML/MIN/1.73
GLOBULIN UR ELPH-MCNC: 2.9 GM/DL
GLUCOSE SERPL-MCNC: 96 MG/DL (ref 65–99)
HCT VFR BLD AUTO: 45.2 % (ref 37.5–51)
HGB BLD-MCNC: 15.4 G/DL (ref 13–17.7)
IMM GRANULOCYTES # BLD AUTO: 0.02 10*3/MM3 (ref 0–0.05)
IMM GRANULOCYTES NFR BLD AUTO: 0.3 % (ref 0–0.5)
LYMPHOCYTES # BLD AUTO: 2.22 10*3/MM3 (ref 0.7–3.1)
LYMPHOCYTES NFR BLD AUTO: 29.7 % (ref 19.6–45.3)
MCH RBC QN AUTO: 32.6 PG (ref 26.6–33)
MCHC RBC AUTO-ENTMCNC: 34.1 G/DL (ref 31.5–35.7)
MCV RBC AUTO: 95.6 FL (ref 79–97)
MONOCYTES # BLD AUTO: 0.78 10*3/MM3 (ref 0.1–0.9)
MONOCYTES NFR BLD AUTO: 10.4 % (ref 5–12)
NEUTROPHILS NFR BLD AUTO: 4.09 10*3/MM3 (ref 1.7–7)
NEUTROPHILS NFR BLD AUTO: 54.8 % (ref 42.7–76)
NRBC BLD AUTO-RTO: 0 /100 WBC (ref 0–0.2)
PLATELET # BLD AUTO: 393 10*3/MM3 (ref 140–450)
PMV BLD AUTO: 9.9 FL (ref 6–12)
POTASSIUM SERPL-SCNC: 4.5 MMOL/L (ref 3.5–5.2)
PROT SERPL-MCNC: 7.4 G/DL (ref 6–8.5)
RBC # BLD AUTO: 4.73 10*6/MM3 (ref 4.14–5.8)
SODIUM SERPL-SCNC: 140 MMOL/L (ref 136–145)
WBC # BLD AUTO: 7.47 10*3/MM3 (ref 3.4–10.8)

## 2021-05-26 PROCEDURE — 82306 VITAMIN D 25 HYDROXY: CPT

## 2021-05-26 PROCEDURE — 85025 COMPLETE CBC W/AUTO DIFF WBC: CPT

## 2021-05-26 PROCEDURE — 99214 OFFICE O/P EST MOD 30 MIN: CPT | Performed by: NURSE PRACTITIONER

## 2021-05-26 PROCEDURE — 71046 X-RAY EXAM CHEST 2 VIEWS: CPT

## 2021-05-26 PROCEDURE — 80053 COMPREHEN METABOLIC PANEL: CPT

## 2021-05-26 PROCEDURE — 36415 COLL VENOUS BLD VENIPUNCTURE: CPT

## 2021-05-26 RX ORDER — HYDROCHLOROTHIAZIDE 12.5 MG/1
12.5 TABLET ORAL DAILY
Qty: 90 TABLET | Refills: 1 | Status: SHIPPED | OUTPATIENT
Start: 2021-05-26 | End: 2022-01-05 | Stop reason: SDUPTHER

## 2021-05-26 RX ORDER — LOVASTATIN 40 MG/1
40 TABLET ORAL NIGHTLY
Qty: 90 TABLET | Refills: 1 | Status: SHIPPED | OUTPATIENT
Start: 2021-05-26 | End: 2021-11-18

## 2021-05-26 RX ORDER — CLOTRIMAZOLE AND BETAMETHASONE DIPROPIONATE 10; .64 MG/G; MG/G
CREAM TOPICAL 2 TIMES DAILY
Qty: 45 G | Refills: 1 | Status: SHIPPED | OUTPATIENT
Start: 2021-05-26 | End: 2021-12-01

## 2021-05-26 RX ORDER — MONTELUKAST SODIUM 10 MG/1
10 TABLET ORAL NIGHTLY
Qty: 90 TABLET | Refills: 1 | Status: SHIPPED | OUTPATIENT
Start: 2021-05-26 | End: 2021-11-18

## 2021-05-26 NOTE — PROGRESS NOTES
Subjective   {CC  Problem List  Visit Diagnosis   Encounters  Notes  Medications  Labs  Result Review Imaging  Media :23}     Perry Tafoya is a 65 y.o. male.     Chief Complaint   Patient presents with   • Hyperlipidemia     6 month f/u   • Hypertension   • Psoriasis   • Cough     cough since February       History of Present Illness  Patient is here for management of his chronic medical problems and new problem cough since feb.   Hyperlipidemia, hypertension, vit d def, gout, psoriasis on legs.    psoriasis he was diagnosed several years ago . He is unsure of what he used in the past. Some steroid cream.     Carpal tunnel -thinks in left hand goes numb feels like asleep he can move it and gets better happens when using weed eater and purposeful movement. He works [employer]    Hypertension: taking hctz 12.5 mg once day has hyperlipidemia.     Vit d def: taking vit d 1000 units daily.     New ; cough since feb. He feels is allergies he is using cough drops did take cough medication  , nyquil , most in evenings. He is allergic to mold and tree pollen. He works maintenance , around dust. No wheezing no fever. Getting less and less with summer coming on. He had this few years ago. Went away on its own. Not a smoker.     Gout: no recent exacerbations.    Hyperlipidemia: taking mevacor 40 mg nightly.           The following portions of the patient's history were reviewed and updated as appropriate: allergies, current medications, past family history, past medical history, past social history, past surgical history and problem list.      Current Outpatient Medications:   •  ascorbic acid (VITAMIN C) 100 MG tablet,  VITAMIN C TABS, Disp: , Rfl:   •  Cholecalciferol 1000 units capsule,  VITAMIN D3 CAPS, Disp: , Rfl:   •  fluticasone (Flonase Allergy Relief) 50 MCG/ACT nasal spray, 2 sprays into the nostril(s) as directed by provider Daily., Disp: 1 bottle, Rfl: 3  •  hydroCHLOROthiazide (HYDRODIURIL) 12.5 MG  "tablet, Take 1 tablet by mouth Daily., Disp: 90 tablet, Rfl: 1  •  lovastatin (MEVACOR) 40 MG tablet, Take 1 tablet by mouth Every Night., Disp: 90 tablet, Rfl: 1  •  Multiple Vitamins-Minerals (QC MENS DAILY MULTIVITAMIN) tablet, QC MENS DAILY MULTIVITAMIN TABS, Disp: , Rfl:   •  Omega-3 Fatty Acids (FISH OIL) 1000 MG capsule capsule, Take 1,000 mg by mouth Daily With Breakfast., Disp: , Rfl:   •  clotrimazole-betamethasone (Lotrisone) 1-0.05 % cream, Apply  topically to the appropriate area as directed 2 (Two) Times a Day., Disp: 45 g, Rfl: 1  •  montelukast (Singulair) 10 MG tablet, Take 1 tablet by mouth Every Night., Disp: 90 tablet, Rfl: 1    Recent Results (from the past 4032 hour(s))   COVID-19,[facility] ROUTINE, NP/OP SWAB IN TRANSPORT MEDIA OR ESWAB 72 HR TAT - Swab, Anterior nasal    Collection Time: 01/19/21 10:56 AM    Specimen: Anterior nasal; Swab   Result Value Ref Range    SARS-CoV-2, MARY KATE Not Detected Not Detected         Review of Systems    Objective     /78 (BP Location: Left arm, Patient Position: Sitting, Cuff Size: Adult)   Pulse 74   Temp 96.9 °F (36.1 °C) (Infrared)   Ht 167.6 cm (66\")   Wt 78.5 kg (173 lb)   SpO2 95%   BMI 27.92 kg/m²     Physical Exam  Vitals and nursing note reviewed.   Constitutional:       Appearance: Normal appearance.   HENT:      Head: Normocephalic.      Right Ear: Tympanic membrane normal.      Left Ear: Tympanic membrane normal.      Nose: Nose normal.      Mouth/Throat:      Mouth: Mucous membranes are moist.   Eyes:      Conjunctiva/sclera: Conjunctivae normal.      Pupils: Pupils are equal, round, and reactive to light.   Cardiovascular:      Rate and Rhythm: Normal rate and regular rhythm.      Pulses: Normal pulses.      Heart sounds: Normal heart sounds.   Pulmonary:      Effort: Pulmonary effort is normal.      Breath sounds: Normal breath sounds.   Abdominal:      General: Abdomen is flat. Bowel sounds are normal.      Palpations: Abdomen is " soft.   Musculoskeletal:      Cervical back: Normal range of motion.   Skin:     General: Skin is warm.      Findings: Rash present. Rash is crusting and scaling.          Neurological:      General: No focal deficit present.      Mental Status: He is alert and oriented to person, place, and time.   Psychiatric:         Mood and Affect: Mood normal.         Behavior: Behavior normal.         Thought Content: Thought content normal.         Judgment: Judgment normal.         Result Review :                Assessment/Plan    Diagnoses and all orders for this visit:    1. Psoriasis (Primary)  -     Ambulatory Referral to Dermatology    2. Mixed hyperlipidemia  -     CBC & Differential; Future  -     Comprehensive Metabolic Panel; Future    3. Secondary hypertension    4. Vitamin D deficiency  -     Vitamin D 25 Hydroxy; Future    5. Acute gout involving toe of right foot, unspecified cause    6. Cough  -     XR Chest 2 View; Future    Other orders  -     lovastatin (MEVACOR) 40 MG tablet; Take 1 tablet by mouth Every Night.  Dispense: 90 tablet; Refill: 1  -     hydroCHLOROthiazide (HYDRODIURIL) 12.5 MG tablet; Take 1 tablet by mouth Daily.  Dispense: 90 tablet; Refill: 1  -     clotrimazole-betamethasone (Lotrisone) 1-0.05 % cream; Apply  topically to the appropriate area as directed 2 (Two) Times a Day.  Dispense: 45 g; Refill: 1  -     montelukast (Singulair) 10 MG tablet; Take 1 tablet by mouth Every Night.  Dispense: 90 tablet; Refill: 1      Patient Instructions   Follow up on labs and chest xray take steroids in am with food  Take singulair daily.  Use the cream on rash.  Follow up with dermatology.   Schedule eye exam.         Follow Up   Return in about 6 months (around 11/26/2021).    Patient was given instructions and counseling regarding his condition or for health maintenance advice. Please see specific information pulled into the AVS if appropriate.     Cherelle Vila, APRN    05/26/21

## 2021-05-26 NOTE — PATIENT INSTRUCTIONS
Follow up on labs and chest xray take steroids in am with food  Take singulair daily.  Use the cream on rash.  Follow up with dermatology.   Schedule eye exam.

## 2021-06-03 ENCOUNTER — TELEPHONE (OUTPATIENT)
Dept: FAMILY MEDICINE CLINIC | Facility: CLINIC | Age: 66
End: 2021-06-03

## 2021-11-18 RX ORDER — LOVASTATIN 40 MG/1
40 TABLET ORAL NIGHTLY
Qty: 90 TABLET | Refills: 1 | Status: SHIPPED | OUTPATIENT
Start: 2021-11-18 | End: 2022-05-17

## 2021-11-18 RX ORDER — MONTELUKAST SODIUM 10 MG/1
10 TABLET ORAL NIGHTLY
Qty: 90 TABLET | Refills: 1 | Status: SHIPPED | OUTPATIENT
Start: 2021-11-18 | End: 2022-05-17

## 2021-12-01 ENCOUNTER — OFFICE VISIT (OUTPATIENT)
Dept: FAMILY MEDICINE CLINIC | Facility: CLINIC | Age: 66
End: 2021-12-01

## 2021-12-01 ENCOUNTER — LAB (OUTPATIENT)
Dept: LAB | Facility: HOSPITAL | Age: 66
End: 2021-12-01

## 2021-12-01 VITALS
DIASTOLIC BLOOD PRESSURE: 94 MMHG | HEIGHT: 66 IN | BODY MASS INDEX: 29.09 KG/M2 | WEIGHT: 181 LBS | HEART RATE: 77 BPM | TEMPERATURE: 96.6 F | OXYGEN SATURATION: 97 % | SYSTOLIC BLOOD PRESSURE: 130 MMHG

## 2021-12-01 DIAGNOSIS — Z11.59 NEED FOR HEPATITIS C SCREENING TEST: ICD-10-CM

## 2021-12-01 DIAGNOSIS — E55.9 VITAMIN D DEFICIENCY: Chronic | ICD-10-CM

## 2021-12-01 DIAGNOSIS — I15.9 SECONDARY HYPERTENSION: ICD-10-CM

## 2021-12-01 DIAGNOSIS — E78.2 MIXED HYPERLIPIDEMIA: Chronic | ICD-10-CM

## 2021-12-01 DIAGNOSIS — Z87.898 HISTORY OF ELEVATED PSA: Chronic | ICD-10-CM

## 2021-12-01 LAB
25(OH)D3 SERPL-MCNC: 45.5 NG/ML (ref 30–100)
ALBUMIN SERPL-MCNC: 4.5 G/DL (ref 3.5–5.2)
ALBUMIN/GLOB SERPL: 1.5 G/DL
ALP SERPL-CCNC: 68 U/L (ref 39–117)
ALT SERPL W P-5'-P-CCNC: 28 U/L (ref 1–41)
ANION GAP SERPL CALCULATED.3IONS-SCNC: 9.7 MMOL/L (ref 5–15)
AST SERPL-CCNC: 27 U/L (ref 1–40)
BILIRUB SERPL-MCNC: 0.3 MG/DL (ref 0–1.2)
BUN SERPL-MCNC: 18 MG/DL (ref 8–23)
BUN/CREAT SERPL: 17.1 (ref 7–25)
CALCIUM SPEC-SCNC: 10 MG/DL (ref 8.6–10.5)
CHLORIDE SERPL-SCNC: 100 MMOL/L (ref 98–107)
CHOLEST SERPL-MCNC: 230 MG/DL (ref 0–200)
CO2 SERPL-SCNC: 27.3 MMOL/L (ref 22–29)
CREAT SERPL-MCNC: 1.05 MG/DL (ref 0.76–1.27)
GFR SERPL CREATININE-BSD FRML MDRD: 71 ML/MIN/1.73
GLOBULIN UR ELPH-MCNC: 3 GM/DL
GLUCOSE SERPL-MCNC: 91 MG/DL (ref 65–99)
HCV AB SER DONR QL: NORMAL
HDLC SERPL-MCNC: 45 MG/DL (ref 40–60)
LDLC SERPL CALC-MCNC: 152 MG/DL (ref 0–100)
LDLC/HDLC SERPL: 3.3 {RATIO}
POTASSIUM SERPL-SCNC: 4.4 MMOL/L (ref 3.5–5.2)
PROT SERPL-MCNC: 7.5 G/DL (ref 6–8.5)
SODIUM SERPL-SCNC: 137 MMOL/L (ref 136–145)
TRIGL SERPL-MCNC: 183 MG/DL (ref 0–150)
VLDLC SERPL-MCNC: 33 MG/DL (ref 5–40)

## 2021-12-01 PROCEDURE — 80061 LIPID PANEL: CPT

## 2021-12-01 PROCEDURE — 80053 COMPREHEN METABOLIC PANEL: CPT

## 2021-12-01 PROCEDURE — 36415 COLL VENOUS BLD VENIPUNCTURE: CPT

## 2021-12-01 PROCEDURE — 99214 OFFICE O/P EST MOD 30 MIN: CPT | Performed by: NURSE PRACTITIONER

## 2021-12-01 PROCEDURE — 82306 VITAMIN D 25 HYDROXY: CPT

## 2021-12-01 PROCEDURE — 86803 HEPATITIS C AB TEST: CPT

## 2021-12-01 RX ORDER — AMLODIPINE BESYLATE 5 MG/1
5 TABLET ORAL DAILY
Qty: 30 TABLET | Refills: 1 | Status: SHIPPED | OUTPATIENT
Start: 2021-12-01 | End: 2022-01-05 | Stop reason: SDUPTHER

## 2021-12-01 RX ORDER — AMLODIPINE BESYLATE 5 MG/1
5 TABLET ORAL DAILY
Qty: 30 TABLET | Refills: 1 | Status: SHIPPED | OUTPATIENT
Start: 2021-12-01 | End: 2021-12-01

## 2021-12-01 NOTE — PROGRESS NOTES
Subjective        Perry Tafoya is a 66 y.o. male.     Chief Complaint   Patient presents with   • Hyperlipidemia     6 month f/u   • Hypertension       History of Present Illness  Patient is here for management of his chronic medical problems: vit d def, hypertension, hyperlipidemia.     Hypertension; taking hctz 12.5 mg once day he does not check this.     Hyperlipidemia taking lovastatn 40 mg nightly. Has hypertension. Taking omega 3 capsules. 1000 mg with food.    Vit d def taking 1000 IU daily.     Allergies taking singulair 10 mg once day. Using flonase daily.         The following portions of the patient's history were reviewed and updated as appropriate: allergies, current medications, past family history, past medical history, past social history, past surgical history and problem list.      Current Outpatient Medications:   •  ascorbic acid (VITAMIN C) 100 MG tablet, SM VITAMIN C TABS, Disp: , Rfl:   •  Cholecalciferol 1000 units capsule,  VITAMIN D3 CAPS, Disp: , Rfl:   •  fluticasone (Flonase Allergy Relief) 50 MCG/ACT nasal spray, 2 sprays into the nostril(s) as directed by provider Daily., Disp: 1 bottle, Rfl: 3  •  hydroCHLOROthiazide (HYDRODIURIL) 12.5 MG tablet, Take 1 tablet by mouth Daily., Disp: 90 tablet, Rfl: 1  •  lovastatin (MEVACOR) 40 MG tablet, TAKE 1 TABLET BY MOUTH EVERY NIGHT, Disp: 90 tablet, Rfl: 1  •  montelukast (SINGULAIR) 10 MG tablet, TAKE 1 TABLET BY MOUTH EVERY NIGHT, Disp: 90 tablet, Rfl: 1  •  Multiple Vitamins-Minerals (QC MENS DAILY MULTIVITAMIN) tablet, QC MENS DAILY MULTIVITAMIN TABS, Disp: , Rfl:   •  Omega-3 Fatty Acids (FISH OIL) 1000 MG capsule capsule, Take 1,000 mg by mouth Daily With Breakfast., Disp: , Rfl:   •  amLODIPine (NORVASC) 5 MG tablet, Take 1 tablet by mouth Daily., Disp: 30 tablet, Rfl: 1    No results found for this or any previous visit (from the past 4032 hour(s)).      Review of Systems    Objective     /94   Pulse 77   Temp 96.6 °F (35.9  "°C) (Infrared)   Ht 167.6 cm (66\")   Wt 82.1 kg (181 lb)   SpO2 97%   BMI 29.21 kg/m²     Physical Exam  Vitals and nursing note reviewed.   Constitutional:       Appearance: Normal appearance.   HENT:      Head: Normocephalic.      Right Ear: External ear normal.      Left Ear: External ear normal.      Nose: Nose normal.      Mouth/Throat:      Mouth: Mucous membranes are moist.   Eyes:      Conjunctiva/sclera: Conjunctivae normal.      Pupils: Pupils are equal, round, and reactive to light.   Cardiovascular:      Rate and Rhythm: Normal rate and regular rhythm.      Pulses: Normal pulses.      Heart sounds: Normal heart sounds.   Pulmonary:      Effort: Pulmonary effort is normal.      Breath sounds: Normal breath sounds.   Abdominal:      General: Bowel sounds are normal.      Palpations: Abdomen is soft.   Musculoskeletal:         General: Normal range of motion.   Skin:     General: Skin is warm and dry.      Capillary Refill: Capillary refill takes less than 2 seconds.   Neurological:      General: No focal deficit present.      Mental Status: He is alert and oriented to person, place, and time.   Psychiatric:         Mood and Affect: Mood normal.         Behavior: Behavior normal.         Thought Content: Thought content normal.         Judgment: Judgment normal.         Result Review :                Assessment/Plan    Diagnoses and all orders for this visit:    1. Mixed hyperlipidemia  Comments:  labs ordered  Orders:  -     Lipid Panel; Future  -     Comprehensive Metabolic Panel; Future    2. Vitamin D deficiency  Comments:  labs ordered  Orders:  -     Vitamin D 25 Hydroxy; Future    3. Need for hepatitis C screening test  Comments:  lab ordered  Orders:  -     Hepatitis C antibody; Future    4. Secondary hypertension  Comments:  start amlodipine with hctz monitor blood pressure    5. History of elevated PSA  Comments:  followed by urology    Other orders  -     amLODIPine (NORVASC) 5 MG tablet; " Take 1 tablet by mouth Daily.  Dispense: 30 tablet; Refill: 1      There are no Patient Instructions on file for this visit.    Follow Up   Return in about 1 month (around 1/1/2022).    Patient was given instructions and counseling regarding his condition or for health maintenance advice. Please see specific information pulled into the AVS if appropriate.     Cherelle Vila, APRN    12/01/21

## 2022-01-05 ENCOUNTER — OFFICE VISIT (OUTPATIENT)
Dept: FAMILY MEDICINE CLINIC | Facility: CLINIC | Age: 67
End: 2022-01-05

## 2022-01-05 VITALS
OXYGEN SATURATION: 97 % | TEMPERATURE: 96.9 F | WEIGHT: 180 LBS | HEART RATE: 82 BPM | HEIGHT: 66 IN | BODY MASS INDEX: 28.93 KG/M2 | SYSTOLIC BLOOD PRESSURE: 153 MMHG | DIASTOLIC BLOOD PRESSURE: 83 MMHG

## 2022-01-05 DIAGNOSIS — E55.9 VITAMIN D DEFICIENCY: Chronic | ICD-10-CM

## 2022-01-05 DIAGNOSIS — E78.2 MIXED HYPERLIPIDEMIA: Chronic | ICD-10-CM

## 2022-01-05 DIAGNOSIS — I15.9 SECONDARY HYPERTENSION: Chronic | ICD-10-CM

## 2022-01-05 PROBLEM — M10.9 ACUTE GOUT INVOLVING TOE OF RIGHT FOOT: Status: RESOLVED | Noted: 2020-04-29 | Resolved: 2022-01-05

## 2022-01-05 PROCEDURE — 99213 OFFICE O/P EST LOW 20 MIN: CPT | Performed by: NURSE PRACTITIONER

## 2022-01-05 RX ORDER — AMLODIPINE BESYLATE 5 MG/1
5 TABLET ORAL DAILY
Qty: 90 TABLET | Refills: 1 | Status: SHIPPED | OUTPATIENT
Start: 2022-01-05 | End: 2022-08-04

## 2022-01-05 RX ORDER — HYDROCHLOROTHIAZIDE 12.5 MG/1
12.5 TABLET ORAL DAILY
Qty: 90 TABLET | Refills: 1 | Status: SHIPPED | OUTPATIENT
Start: 2022-01-05 | End: 2022-08-04

## 2022-01-05 NOTE — PROGRESS NOTES
Subjective        Perry Tafoya is a 66 y.o. male.     Chief Complaint   Patient presents with   • Hypertension     1 month f/u       History of Present Illness  Patient is here for management of his hypertension,hyperlipidemia,  Vit d def.    Hypertension he is checking at home he was out few days. Amlodipine 5 mg once day. Hydrochlorothiazide 12.5 mg daily.    Hyperlipidemia: taking lovastatin 40 mg nightly.has hypertension.    Vit d def taking vit d 1000 Units daily.         The following portions of the patient's history were reviewed and updated as appropriate: allergies, current medications, past family history, past medical history, past social history, past surgical history and problem list.      Current Outpatient Medications:   •  amLODIPine (NORVASC) 5 MG tablet, Take 1 tablet by mouth Daily., Disp: 90 tablet, Rfl: 1  •  ascorbic acid (VITAMIN C) 100 MG tablet,  VITAMIN C TABS, Disp: , Rfl:   •  Cholecalciferol 1000 units capsule,  VITAMIN D3 CAPS, Disp: , Rfl:   •  fluticasone (Flonase Allergy Relief) 50 MCG/ACT nasal spray, 2 sprays into the nostril(s) as directed by provider Daily., Disp: 1 bottle, Rfl: 3  •  hydroCHLOROthiazide (HYDRODIURIL) 12.5 MG tablet, Take 1 tablet by mouth Daily., Disp: 90 tablet, Rfl: 1  •  lovastatin (MEVACOR) 40 MG tablet, TAKE 1 TABLET BY MOUTH EVERY NIGHT, Disp: 90 tablet, Rfl: 1  •  montelukast (SINGULAIR) 10 MG tablet, TAKE 1 TABLET BY MOUTH EVERY NIGHT, Disp: 90 tablet, Rfl: 1  •  Multiple Vitamins-Minerals (QC MENS DAILY MULTIVITAMIN) tablet, QC MENS DAILY MULTIVITAMIN TABS, Disp: , Rfl:   •  Omega-3 Fatty Acids (FISH OIL) 1000 MG capsule capsule, Take 1,000 mg by mouth Daily With Breakfast., Disp: , Rfl:     Recent Results (from the past 4032 hour(s))   Lipid Panel    Collection Time: 12/01/21 10:47 AM    Specimen: Blood   Result Value Ref Range    Total Cholesterol 230 (H) 0 - 200 mg/dL    Triglycerides 183 (H) 0 - 150 mg/dL    HDL Cholesterol 45 40 - 60 mg/dL  "   LDL Cholesterol  152 (H) 0 - 100 mg/dL    VLDL Cholesterol 33 5 - 40 mg/dL    LDL/HDL Ratio 3.30    Comprehensive Metabolic Panel    Collection Time: 12/01/21 10:47 AM    Specimen: Blood   Result Value Ref Range    Glucose 91 65 - 99 mg/dL    BUN 18 8 - 23 mg/dL    Creatinine 1.05 0.76 - 1.27 mg/dL    Sodium 137 136 - 145 mmol/L    Potassium 4.4 3.5 - 5.2 mmol/L    Chloride 100 98 - 107 mmol/L    CO2 27.3 22.0 - 29.0 mmol/L    Calcium 10.0 8.6 - 10.5 mg/dL    Total Protein 7.5 6.0 - 8.5 g/dL    Albumin 4.50 3.50 - 5.20 g/dL    ALT (SGPT) 28 1 - 41 U/L    AST (SGOT) 27 1 - 40 U/L    Alkaline Phosphatase 68 39 - 117 U/L    Total Bilirubin 0.3 0.0 - 1.2 mg/dL    eGFR Non African Amer 71 >60 mL/min/1.73    Globulin 3.0 gm/dL    A/G Ratio 1.5 g/dL    BUN/Creatinine Ratio 17.1 7.0 - 25.0    Anion Gap 9.7 5.0 - 15.0 mmol/L   Vitamin D 25 Hydroxy    Collection Time: 12/01/21 10:47 AM    Specimen: Blood   Result Value Ref Range    25 Hydroxy, Vitamin D 45.5 30.0 - 100.0 ng/ml   Hepatitis C antibody    Collection Time: 12/01/21 10:47 AM    Specimen: Blood   Result Value Ref Range    Hepatitis C Ab Non-Reactive Non-Reactive         Review of Systems    Objective     /83 (BP Location: Left arm, Patient Position: Sitting, Cuff Size: Adult)   Pulse 82   Temp 96.9 °F (36.1 °C) (Infrared)   Ht 167.6 cm (66\")   Wt 81.6 kg (180 lb)   SpO2 97%   BMI 29.05 kg/m²     Physical Exam  Vitals and nursing note reviewed.   Constitutional:       Appearance: Normal appearance.   HENT:      Head: Normocephalic.      Right Ear: External ear normal.      Left Ear: External ear normal.      Nose: Nose normal.      Mouth/Throat:      Mouth: Mucous membranes are moist.   Eyes:      Pupils: Pupils are equal, round, and reactive to light.   Cardiovascular:      Rate and Rhythm: Normal rate and regular rhythm.      Pulses: Normal pulses.      Heart sounds: Normal heart sounds.   Pulmonary:      Effort: Pulmonary effort is normal.      " Breath sounds: Normal breath sounds.   Abdominal:      General: Bowel sounds are normal.      Palpations: Abdomen is soft.   Musculoskeletal:         General: Normal range of motion.      Cervical back: Normal range of motion.   Skin:     General: Skin is warm and dry.      Capillary Refill: Capillary refill takes less than 2 seconds.   Neurological:      General: No focal deficit present.      Mental Status: He is alert and oriented to person, place, and time.   Psychiatric:         Mood and Affect: Mood normal.         Behavior: Behavior normal.         Thought Content: Thought content normal.         Judgment: Judgment normal.         Result Review :                Assessment/Plan    Diagnoses and all orders for this visit:    1. Mixed hyperlipidemia  Comments:  stable    2. Vitamin D deficiency  Comments:  stable    3. Secondary hypertension  Comments:  stable    Other orders  -     hydroCHLOROthiazide (HYDRODIURIL) 12.5 MG tablet; Take 1 tablet by mouth Daily.  Dispense: 90 tablet; Refill: 1  -     amLODIPine (NORVASC) 5 MG tablet; Take 1 tablet by mouth Daily.  Dispense: 90 tablet; Refill: 1      There are no Patient Instructions on file for this visit.    Follow Up   No follow-ups on file.    Patient was given instructions and counseling regarding his condition or for health maintenance advice. Please see specific information pulled into the AVS if appropriate.     Cherelle Vila, APRN    01/05/22

## 2022-05-17 RX ORDER — MONTELUKAST SODIUM 10 MG/1
10 TABLET ORAL NIGHTLY
Qty: 90 TABLET | Refills: 1 | Status: SHIPPED | OUTPATIENT
Start: 2022-05-17 | End: 2022-11-02

## 2022-05-17 RX ORDER — LOVASTATIN 40 MG/1
40 TABLET ORAL NIGHTLY
Qty: 90 TABLET | Refills: 1 | Status: SHIPPED | OUTPATIENT
Start: 2022-05-17 | End: 2022-07-07

## 2022-07-06 ENCOUNTER — LAB (OUTPATIENT)
Dept: LAB | Facility: HOSPITAL | Age: 67
End: 2022-07-06

## 2022-07-06 ENCOUNTER — OFFICE VISIT (OUTPATIENT)
Dept: FAMILY MEDICINE CLINIC | Facility: CLINIC | Age: 67
End: 2022-07-06

## 2022-07-06 VITALS
OXYGEN SATURATION: 94 % | HEART RATE: 74 BPM | HEIGHT: 66 IN | WEIGHT: 177 LBS | BODY MASS INDEX: 28.45 KG/M2 | SYSTOLIC BLOOD PRESSURE: 142 MMHG | TEMPERATURE: 96.9 F | DIASTOLIC BLOOD PRESSURE: 81 MMHG

## 2022-07-06 DIAGNOSIS — I15.9 SECONDARY HYPERTENSION: ICD-10-CM

## 2022-07-06 DIAGNOSIS — E55.9 VITAMIN D DEFICIENCY: ICD-10-CM

## 2022-07-06 DIAGNOSIS — E78.2 MIXED HYPERLIPIDEMIA: ICD-10-CM

## 2022-07-06 DIAGNOSIS — E78.2 MIXED HYPERLIPIDEMIA: Primary | ICD-10-CM

## 2022-07-06 DIAGNOSIS — Z87.898 HISTORY OF ELEVATED PSA: Chronic | ICD-10-CM

## 2022-07-06 LAB
25(OH)D3 SERPL-MCNC: 53.5 NG/ML (ref 30–100)
ALBUMIN SERPL-MCNC: 4.7 G/DL (ref 3.5–5.2)
ALBUMIN/GLOB SERPL: 1.8 G/DL
ALP SERPL-CCNC: 68 U/L (ref 39–117)
ALT SERPL W P-5'-P-CCNC: 28 U/L (ref 1–41)
ANION GAP SERPL CALCULATED.3IONS-SCNC: 12 MMOL/L (ref 5–15)
AST SERPL-CCNC: 24 U/L (ref 1–40)
BILIRUB SERPL-MCNC: 0.4 MG/DL (ref 0–1.2)
BUN SERPL-MCNC: 19 MG/DL (ref 8–23)
BUN/CREAT SERPL: 19.2 (ref 7–25)
CALCIUM SPEC-SCNC: 9.9 MG/DL (ref 8.6–10.5)
CHLORIDE SERPL-SCNC: 100 MMOL/L (ref 98–107)
CHOLEST SERPL-MCNC: 232 MG/DL (ref 0–200)
CO2 SERPL-SCNC: 27 MMOL/L (ref 22–29)
CREAT SERPL-MCNC: 0.99 MG/DL (ref 0.76–1.27)
EGFRCR SERPLBLD CKD-EPI 2021: 85.1 ML/MIN/1.73
GLOBULIN UR ELPH-MCNC: 2.6 GM/DL
GLUCOSE SERPL-MCNC: 88 MG/DL (ref 65–99)
HDLC SERPL-MCNC: 44 MG/DL (ref 40–60)
LDLC SERPL CALC-MCNC: 145 MG/DL (ref 0–100)
LDLC/HDLC SERPL: 3.21 {RATIO}
POTASSIUM SERPL-SCNC: 4.3 MMOL/L (ref 3.5–5.2)
PROT SERPL-MCNC: 7.3 G/DL (ref 6–8.5)
SODIUM SERPL-SCNC: 139 MMOL/L (ref 136–145)
TRIGL SERPL-MCNC: 234 MG/DL (ref 0–150)
VLDLC SERPL-MCNC: 43 MG/DL (ref 5–40)

## 2022-07-06 PROCEDURE — 80053 COMPREHEN METABOLIC PANEL: CPT

## 2022-07-06 PROCEDURE — 36415 COLL VENOUS BLD VENIPUNCTURE: CPT

## 2022-07-06 PROCEDURE — 82306 VITAMIN D 25 HYDROXY: CPT

## 2022-07-06 PROCEDURE — 99214 OFFICE O/P EST MOD 30 MIN: CPT | Performed by: NURSE PRACTITIONER

## 2022-07-06 PROCEDURE — 80061 LIPID PANEL: CPT

## 2022-07-06 NOTE — PROGRESS NOTES
Subjective        Perry Tafoya is a 66 y.o. male.     Chief Complaint   Patient presents with   • Hypertension   • Hyperlipidemia     6 month f/u       History of Present Illness  Patient is here for management of his chronic medical problems: hypertension, hyperlipidemia.     Hypertension: taking amlodipine 5 mg daily and hctz 12.5 mg     Hyperlipidemia; lovastatin 40 mg nightly. 12/2021 tC 230 high tri 183 high hdl 45 normal ldl 152 high.     History of elevated PSA he is followed by urology for PSA screening.       The following portions of the patient's history were reviewed and updated as appropriate: allergies, current medications, past family history, past medical history, past social history, past surgical history and problem list.      Current Outpatient Medications:   •  amLODIPine (NORVASC) 5 MG tablet, Take 1 tablet by mouth Daily., Disp: 90 tablet, Rfl: 1  •  ascorbic acid (VITAMIN C) 100 MG tablet,  VITAMIN C TABS, Disp: , Rfl:   •  Cholecalciferol 1000 units capsule,  VITAMIN D3 CAPS, Disp: , Rfl:   •  fluticasone (Flonase Allergy Relief) 50 MCG/ACT nasal spray, 2 sprays into the nostril(s) as directed by provider Daily., Disp: 1 bottle, Rfl: 3  •  hydroCHLOROthiazide (HYDRODIURIL) 12.5 MG tablet, Take 1 tablet by mouth Daily., Disp: 90 tablet, Rfl: 1  •  lovastatin (MEVACOR) 40 MG tablet, TAKE 1 TABLET BY MOUTH EVERY NIGHT, Disp: 90 tablet, Rfl: 1  •  montelukast (SINGULAIR) 10 MG tablet, TAKE 1 TABLET BY MOUTH EVERY NIGHT, Disp: 90 tablet, Rfl: 1  •  Multiple Vitamins-Minerals (QC MENS DAILY MULTIVITAMIN) tablet, QC MENS DAILY MULTIVITAMIN TABS, Disp: , Rfl:   •  Omega-3 Fatty Acids (FISH OIL) 1000 MG capsule capsule, Take 1,000 mg by mouth Daily With Breakfast., Disp: , Rfl:     No results found for this or any previous visit (from the past 4032 hour(s)).      Review of Systems    Objective     /81 (BP Location: Left arm, Patient Position: Sitting, Cuff Size: Adult)   Pulse 74   Temp  "96.9 °F (36.1 °C) (Infrared)   Ht 167.6 cm (66\")   Wt 80.3 kg (177 lb)   SpO2 94%   BMI 28.57 kg/m²     Physical Exam  Vitals and nursing note reviewed.   Constitutional:       Appearance: Normal appearance.   HENT:      Head: Normocephalic.      Right Ear: External ear normal.      Left Ear: External ear normal.      Nose: Nose normal.      Mouth/Throat:      Mouth: Mucous membranes are moist.   Eyes:      Conjunctiva/sclera: Conjunctivae normal.      Pupils: Pupils are equal, round, and reactive to light.   Cardiovascular:      Rate and Rhythm: Normal rate and regular rhythm.      Pulses: Normal pulses.      Heart sounds: Normal heart sounds.   Pulmonary:      Effort: Pulmonary effort is normal.      Breath sounds: Normal breath sounds.   Abdominal:      General: Bowel sounds are normal.      Palpations: Abdomen is soft.   Musculoskeletal:         General: Normal range of motion.      Cervical back: Normal range of motion and neck supple.   Skin:     General: Skin is warm and dry.      Capillary Refill: Capillary refill takes less than 2 seconds.   Neurological:      General: No focal deficit present.      Mental Status: He is alert and oriented to person, place, and time.   Psychiatric:         Mood and Affect: Mood normal.         Behavior: Behavior normal.         Thought Content: Thought content normal.         Judgment: Judgment normal.         Result Review :                Assessment & Plan    Diagnoses and all orders for this visit:    1. Mixed hyperlipidemia (Primary)  -     Lipid Panel; Future  -     Comprehensive Metabolic Panel; Future    2. Secondary hypertension  -     Lipid Panel; Future  -     Comprehensive Metabolic Panel; Future    3. Vitamin D deficiency  -     Vitamin D 25 Hydroxy; Future    4. History of elevated PSA  Comments:  followed by urology      Patient Instructions   Follow up on labs monitor blood pressure.   Eat healthy and exercise.       Follow Up   Return in about 6 months " (around 1/6/2023).    Patient was given instructions and counseling regarding his condition or for health maintenance advice. Please see specific information pulled into the AVS if appropriate.     Cherelle Vila, APRN    07/06/22

## 2022-07-07 RX ORDER — LOVASTATIN 40 MG/1
80 TABLET ORAL NIGHTLY
Qty: 180 TABLET | Refills: 1 | Status: SHIPPED | OUTPATIENT
Start: 2022-07-07 | End: 2022-11-02

## 2022-08-04 RX ORDER — AMLODIPINE BESYLATE 5 MG/1
5 TABLET ORAL DAILY
Qty: 90 TABLET | Refills: 1 | Status: SHIPPED | OUTPATIENT
Start: 2022-08-04 | End: 2023-01-29

## 2022-08-04 RX ORDER — HYDROCHLOROTHIAZIDE 12.5 MG/1
12.5 TABLET ORAL DAILY
Qty: 90 TABLET | Refills: 1 | Status: SHIPPED | OUTPATIENT
Start: 2022-08-04 | End: 2023-01-04 | Stop reason: ALTCHOICE

## 2022-08-12 ENCOUNTER — OFFICE VISIT (OUTPATIENT)
Dept: FAMILY MEDICINE CLINIC | Facility: CLINIC | Age: 67
End: 2022-08-12

## 2022-08-12 VITALS
SYSTOLIC BLOOD PRESSURE: 118 MMHG | HEIGHT: 66 IN | HEART RATE: 83 BPM | BODY MASS INDEX: 28.61 KG/M2 | TEMPERATURE: 96.6 F | WEIGHT: 178 LBS | OXYGEN SATURATION: 97 % | DIASTOLIC BLOOD PRESSURE: 79 MMHG

## 2022-08-12 DIAGNOSIS — M25.422 EFFUSION OF BURSA OF LEFT ELBOW: Primary | ICD-10-CM

## 2022-08-12 PROCEDURE — 99213 OFFICE O/P EST LOW 20 MIN: CPT | Performed by: NURSE PRACTITIONER

## 2022-08-12 RX ORDER — INDOMETHACIN 50 MG/1
50 CAPSULE ORAL 3 TIMES DAILY PRN
Qty: 15 CAPSULE | Refills: 0 | Status: SHIPPED | OUTPATIENT
Start: 2022-08-12

## 2022-08-12 NOTE — PATIENT INSTRUCTIONS
Take the inodmethcin 3 times a day for 5 days  Use ace wrap or elbow sleeve  Use ice 20 20 min every couple hours  F/u with ortho

## 2022-08-12 NOTE — PROGRESS NOTES
Subjective   {CC  Problem List  Visit Diagnosis   Encounters  Notes  Medications  Labs  Result Review Imaging  Media :23}     Perry Tafoya is a 66 y.o. male.     Chief Complaint   Patient presents with   • Elbow Injury     L elbow pain when extending arm       History of Present Illness  Patient is here for injury left elbow . Painful to lay elbow on something.   No fever, can use the arm. Has had this 3 weeks.   Took ibuprofen few times.     The following portions of the patient's history were reviewed and updated as appropriate: allergies, current medications, past family history, past medical history, past social history, past surgical history and problem list.      Current Outpatient Medications:   •  amLODIPine (NORVASC) 5 MG tablet, TAKE 1 TABLET BY MOUTH DAILY, Disp: 90 tablet, Rfl: 1  •  ascorbic acid (VITAMIN C) 100 MG tablet, SM VITAMIN C TABS, Disp: , Rfl:   •  Cholecalciferol 1000 units capsule,  VITAMIN D3 CAPS, Disp: , Rfl:   •  fluticasone (Flonase Allergy Relief) 50 MCG/ACT nasal spray, 2 sprays into the nostril(s) as directed by provider Daily., Disp: 1 bottle, Rfl: 3  •  hydroCHLOROthiazide (HYDRODIURIL) 12.5 MG tablet, TAKE 1 TABLET BY MOUTH DAILY, Disp: 90 tablet, Rfl: 1  •  lovastatin (MEVACOR) 40 MG tablet, Take 2 tablets by mouth Every Night., Disp: 180 tablet, Rfl: 1  •  montelukast (SINGULAIR) 10 MG tablet, TAKE 1 TABLET BY MOUTH EVERY NIGHT, Disp: 90 tablet, Rfl: 1  •  Multiple Vitamins-Minerals (QC MENS DAILY MULTIVITAMIN) tablet, QC MENS DAILY MULTIVITAMIN TABS, Disp: , Rfl:   •  Omega-3 Fatty Acids (FISH OIL) 1000 MG capsule capsule, Take 1,000 mg by mouth Daily With Breakfast., Disp: , Rfl:   •  indomethacin (INDOCIN) 50 MG capsule, Take 1 capsule by mouth 3 (Three) Times a Day As Needed for Mild Pain ., Disp: 15 capsule, Rfl: 0    Recent Results (from the past 4032 hour(s))   Lipid Panel    Collection Time: 07/06/22 10:48 AM    Specimen: Blood   Result Value Ref Range     "Total Cholesterol 232 (H) 0 - 200 mg/dL    Triglycerides 234 (H) 0 - 150 mg/dL    HDL Cholesterol 44 40 - 60 mg/dL    LDL Cholesterol  145 (H) 0 - 100 mg/dL    VLDL Cholesterol 43 (H) 5 - 40 mg/dL    LDL/HDL Ratio 3.21    Comprehensive Metabolic Panel    Collection Time: 07/06/22 10:48 AM    Specimen: Blood   Result Value Ref Range    Glucose 88 65 - 99 mg/dL    BUN 19 8 - 23 mg/dL    Creatinine 0.99 0.76 - 1.27 mg/dL    Sodium 139 136 - 145 mmol/L    Potassium 4.3 3.5 - 5.2 mmol/L    Chloride 100 98 - 107 mmol/L    CO2 27.0 22.0 - 29.0 mmol/L    Calcium 9.9 8.6 - 10.5 mg/dL    Total Protein 7.3 6.0 - 8.5 g/dL    Albumin 4.70 3.50 - 5.20 g/dL    ALT (SGPT) 28 1 - 41 U/L    AST (SGOT) 24 1 - 40 U/L    Alkaline Phosphatase 68 39 - 117 U/L    Total Bilirubin 0.4 0.0 - 1.2 mg/dL    Globulin 2.6 gm/dL    A/G Ratio 1.8 g/dL    BUN/Creatinine Ratio 19.2 7.0 - 25.0    Anion Gap 12.0 5.0 - 15.0 mmol/L    eGFR 85.1 >60.0 mL/min/1.73   Vitamin D 25 Hydroxy    Collection Time: 07/06/22 10:48 AM    Specimen: Blood   Result Value Ref Range    25 Hydroxy, Vitamin D 53.5 30.0 - 100.0 ng/ml         Review of Systems    Objective     /79 (BP Location: Right arm, Patient Position: Sitting, Cuff Size: Adult)   Pulse 83   Temp 96.6 °F (35.9 °C) (Infrared)   Ht 167.6 cm (66\")   Wt 80.7 kg (178 lb)   SpO2 97%   BMI 28.73 kg/m²     Physical Exam  Vitals and nursing note reviewed.   HENT:      Head: Normocephalic.      Right Ear: External ear normal.      Left Ear: External ear normal.      Nose: Nose normal.      Mouth/Throat:      Mouth: Mucous membranes are moist.   Cardiovascular:      Rate and Rhythm: Normal rate and regular rhythm.      Pulses: Normal pulses.      Heart sounds: Normal heart sounds.   Pulmonary:      Breath sounds: Normal breath sounds.   Abdominal:      Palpations: Abdomen is soft.   Musculoskeletal:         General: Tenderness and signs of injury present.      Left elbow: Effusion present. Tenderness " present.        Arms:       Left lower leg: Edema present.   Skin:     General: Skin is warm.   Neurological:      General: No focal deficit present.      Mental Status: He is alert and oriented to person, place, and time.   Psychiatric:         Mood and Affect: Mood normal.         Behavior: Behavior normal.         Thought Content: Thought content normal.         Judgment: Judgment normal.         Result Review :                Assessment & Plan    Diagnoses and all orders for this visit:    1. Effusion of bursa of left elbow (Primary)  -     Ambulatory Referral to Orthopedic Surgery    Other orders  -     indomethacin (INDOCIN) 50 MG capsule; Take 1 capsule by mouth 3 (Three) Times a Day As Needed for Mild Pain .  Dispense: 15 capsule; Refill: 0      Patient Instructions   Take the inodmethcin 3 times a day for 5 days  Use ace wrap or elbow sleeve  Use ice 20 20 min every couple hours  F/u with ortho       Follow Up   No follow-ups on file.    Patient was given instructions and counseling regarding his condition or for health maintenance advice. Please see specific information pulled into the AVS if appropriate.     Cherelle Vila, APRN    08/12/22

## 2022-08-30 ENCOUNTER — OFFICE VISIT (OUTPATIENT)
Dept: ORTHOPEDIC SURGERY | Facility: CLINIC | Age: 67
End: 2022-08-30

## 2022-08-30 VITALS — BODY MASS INDEX: 29.44 KG/M2 | WEIGHT: 183.2 LBS | HEART RATE: 74 BPM | HEIGHT: 66 IN

## 2022-08-30 DIAGNOSIS — M25.529 ELBOW PAIN, UNSPECIFIED LATERALITY: Primary | ICD-10-CM

## 2022-08-30 PROCEDURE — 99213 OFFICE O/P EST LOW 20 MIN: CPT | Performed by: FAMILY MEDICINE

## 2022-08-30 RX ORDER — BENZONATATE 100 MG/1
CAPSULE ORAL
COMMUNITY
Start: 2022-08-27 | End: 2023-01-04

## 2022-08-30 RX ORDER — AZITHROMYCIN 250 MG/1
TABLET, FILM COATED ORAL
COMMUNITY
Start: 2022-08-27 | End: 2023-01-04

## 2022-11-02 RX ORDER — LOVASTATIN 40 MG/1
80 TABLET ORAL NIGHTLY
Qty: 180 TABLET | Refills: 1 | Status: SHIPPED | OUTPATIENT
Start: 2022-11-02

## 2022-11-02 RX ORDER — MONTELUKAST SODIUM 10 MG/1
10 TABLET ORAL NIGHTLY
Qty: 90 TABLET | Refills: 1 | Status: SHIPPED | OUTPATIENT
Start: 2022-11-02

## 2023-01-04 ENCOUNTER — OFFICE VISIT (OUTPATIENT)
Dept: FAMILY MEDICINE CLINIC | Facility: CLINIC | Age: 68
End: 2023-01-04
Payer: MEDICARE

## 2023-01-04 VITALS
DIASTOLIC BLOOD PRESSURE: 70 MMHG | OXYGEN SATURATION: 96 % | WEIGHT: 181 LBS | BODY MASS INDEX: 30.16 KG/M2 | HEIGHT: 65 IN | HEART RATE: 93 BPM | SYSTOLIC BLOOD PRESSURE: 116 MMHG | TEMPERATURE: 97.6 F

## 2023-01-04 DIAGNOSIS — L40.9 PSORIASIS: Chronic | ICD-10-CM

## 2023-01-04 DIAGNOSIS — I15.9 SECONDARY HYPERTENSION: Chronic | ICD-10-CM

## 2023-01-04 DIAGNOSIS — E78.2 MIXED HYPERLIPIDEMIA: ICD-10-CM

## 2023-01-04 DIAGNOSIS — E66.09 CLASS 1 OBESITY DUE TO EXCESS CALORIES WITH SERIOUS COMORBIDITY AND BODY MASS INDEX (BMI) OF 30.0 TO 30.9 IN ADULT: Chronic | ICD-10-CM

## 2023-01-04 PROBLEM — E66.811 CLASS 1 OBESITY WITH SERIOUS COMORBIDITY AND BODY MASS INDEX (BMI) OF 30.0 TO 30.9 IN ADULT: Status: ACTIVE | Noted: 2023-01-04

## 2023-01-04 PROBLEM — E66.9 CLASS 1 OBESITY WITH SERIOUS COMORBIDITY AND BODY MASS INDEX (BMI) OF 30.0 TO 30.9 IN ADULT: Status: ACTIVE | Noted: 2023-01-04

## 2023-01-04 PROCEDURE — 99214 OFFICE O/P EST MOD 30 MIN: CPT | Performed by: NURSE PRACTITIONER

## 2023-01-04 PROCEDURE — 1159F MED LIST DOCD IN RCRD: CPT | Performed by: NURSE PRACTITIONER

## 2023-01-04 PROCEDURE — 1160F RVW MEDS BY RX/DR IN RCRD: CPT | Performed by: NURSE PRACTITIONER

## 2023-01-04 NOTE — PROGRESS NOTES
Subjective        Abel Tafoya is a 67 y.o. male.     Chief Complaint   Patient presents with   • Hypertension   • Hyperlipidemia     6 month f/u       History of Present Illness  Patient is here for management of his chronic medical problems: hypertension, hyperlipidemia.     Hypertension: he stopped his hctz because was causing his blood pressure to be low. He was not feeling well was 101/50. He kept checking and was staying low last night 148/78 said he is feeling better without this med. Taking amlodipine 5 mg daily .    Hyperlipidemia: taking lovastatin 40 mg at night omega 3 fish oil daily. 7/2022  high tri 234 high hdl 44 normal ldl 145 high vldl 43 high.     Allergies: taking singulair 10 mg at night. flonase during the day. .    Gout: no recent flares takes indocin as needed.       The following portions of the patient's history were reviewed and updated as appropriate: allergies, current medications, past family history, past medical history, past social history, past surgical history and problem list.      Current Outpatient Medications:   •  amLODIPine (NORVASC) 5 MG tablet, TAKE 1 TABLET BY MOUTH DAILY, Disp: 90 tablet, Rfl: 1  •  ascorbic acid (VITAMIN C) 100 MG tablet,  VITAMIN C TABS, Disp: , Rfl:   •  Cholecalciferol 1000 units capsule,  VITAMIN D3 CAPS, Disp: , Rfl:   •  fluticasone (Flonase Allergy Relief) 50 MCG/ACT nasal spray, 2 sprays into the nostril(s) as directed by provider Daily., Disp: 1 bottle, Rfl: 3  •  indomethacin (INDOCIN) 50 MG capsule, Take 1 capsule by mouth 3 (Three) Times a Day As Needed for Mild Pain ., Disp: 15 capsule, Rfl: 0  •  lovastatin (MEVACOR) 40 MG tablet, TAKE 2 TABLETS BY MOUTH EVERY NIGHT, Disp: 180 tablet, Rfl: 1  •  montelukast (SINGULAIR) 10 MG tablet, TAKE 1 TABLET BY MOUTH EVERY NIGHT, Disp: 90 tablet, Rfl: 1  •  Multiple Vitamins-Minerals (QC MENS DAILY MULTIVITAMIN) tablet, QC MENS DAILY MULTIVITAMIN TABS, Disp: , Rfl:   •  Omega-3 Fatty Acids  (FISH OIL) 1000 MG capsule capsule, Take 1,000 mg by mouth Daily With Breakfast., Disp: , Rfl:     No results found for this or any previous visit (from the past 4032 hour(s)).      Review of Systems    Objective     /70   Pulse 93   Temp 97.6 °F (36.4 °C) (Infrared)   Ht 165.1 cm (65\")   Wt 82.1 kg (181 lb)   SpO2 96%   BMI 30.12 kg/m²     Physical Exam  Vitals and nursing note reviewed.   Constitutional:       Appearance: Normal appearance.   HENT:      Head: Normocephalic.      Right Ear: External ear normal.      Left Ear: External ear normal.      Nose: Nose normal.      Mouth/Throat:      Mouth: Mucous membranes are moist.   Eyes:      Pupils: Pupils are equal, round, and reactive to light.   Cardiovascular:      Rate and Rhythm: Normal rate and regular rhythm.      Pulses: Normal pulses.      Heart sounds: Normal heart sounds.   Pulmonary:      Effort: Pulmonary effort is normal.      Breath sounds: Normal breath sounds.   Abdominal:      Palpations: Abdomen is soft.   Musculoskeletal:      Cervical back: Neck supple.   Skin:     General: Skin is warm.      Capillary Refill: Capillary refill takes less than 2 seconds.      Findings: Rash present.          Neurological:      General: No focal deficit present.      Mental Status: He is alert and oriented to person, place, and time.   Psychiatric:         Mood and Affect: Mood normal.         Thought Content: Thought content normal.         Result Review :                Assessment & Plan    Diagnoses and all orders for this visit:    1. Psoriasis  Comments:  referred to dermatology  Orders:  -     Ambulatory Referral to Dermatology    2. Class 1 obesity due to excess calories with serious comorbidity and body mass index (BMI) of 30.0 to 30.9 in adult  Comments:  he bought a bike for home use. discussed the need to eat healthy and exercise for himself daily  Assessment & Plan:  Patient's (Body mass index is 30.12 kg/m².) indicates that they are obese  (BMI >30) with health conditions that include hypertension . Weight is unchanged. BMI is is above average; BMI management plan is completed. We discussed portion control, increasing exercise and joining a fitness center or start home based exercise program.       3. Mixed hyperlipidemia  Comments:  continue current medications. discussed the need for exercise.     4. Secondary hypertension  Comments:  stable    Patient Instructions   Eat healthy exercise  Start using your exercise equipment.   You have to have purposeful exercise  Labs next visit.  Monitor blood pressure.        Follow Up   Return in about 6 months (around 7/4/2023).    Patient was given instructions and counseling regarding his condition or for health maintenance advice. Please see specific information pulled into the AVS if appropriate.     Cherelle Vila, APRN    01/04/23

## 2023-01-04 NOTE — ASSESSMENT & PLAN NOTE
Patient's (Body mass index is 30.12 kg/m².) indicates that they are obese (BMI >30) with health conditions that include hypertension . Weight is unchanged. BMI is is above average; BMI management plan is completed. We discussed portion control, increasing exercise and joining a fitness center or start home based exercise program.

## 2023-01-04 NOTE — PATIENT INSTRUCTIONS
Eat healthy exercise  Start using your exercise equipment.   You have to have purposeful exercise  Labs next visit.  Monitor blood pressure.

## 2023-01-29 RX ORDER — AMLODIPINE BESYLATE 5 MG/1
5 TABLET ORAL DAILY
Qty: 90 TABLET | Refills: 1 | Status: SHIPPED | OUTPATIENT
Start: 2023-01-29

## 2023-06-05 RX ORDER — LOVASTATIN 40 MG/1
80 TABLET ORAL NIGHTLY
Qty: 180 TABLET | Refills: 1 | Status: SHIPPED | OUTPATIENT
Start: 2023-06-05

## 2023-06-05 RX ORDER — AMLODIPINE BESYLATE 5 MG/1
5 TABLET ORAL DAILY
Qty: 90 TABLET | Refills: 1 | Status: SHIPPED | OUTPATIENT
Start: 2023-06-05

## 2023-06-05 RX ORDER — MONTELUKAST SODIUM 10 MG/1
10 TABLET ORAL NIGHTLY
Qty: 90 TABLET | Refills: 1 | Status: SHIPPED | OUTPATIENT
Start: 2023-06-05

## 2023-07-05 PROBLEM — Z00.00 WELCOME TO MEDICARE PREVENTIVE VISIT: Status: ACTIVE | Noted: 2019-04-24

## 2023-07-06 ENCOUNTER — TELEPHONE (OUTPATIENT)
Dept: FAMILY MEDICINE CLINIC | Facility: CLINIC | Age: 68
End: 2023-07-06

## 2023-07-06 NOTE — TELEPHONE ENCOUNTER
Caller: Abel Tafoya    Relationship to patient: Self    Best call back number: [phone]    Patient is needing: PATIENT IS CALLING FOR HIS TEST RESULTS

## 2023-09-28 ENCOUNTER — HOSPITAL ENCOUNTER (INPATIENT)
Facility: HOSPITAL | Age: 68
LOS: 3 days | Discharge: HOME | DRG: 035 | End: 2023-10-01
Attending: RADIOLOGY | Admitting: INTERNAL MEDICINE
Payer: COMMERCIAL

## 2023-09-28 ENCOUNTER — APPOINTMENT (OUTPATIENT)
Dept: CT IMAGING | Facility: HOSPITAL | Age: 68
DRG: 035 | End: 2023-09-28
Payer: MEDICARE

## 2023-09-28 ENCOUNTER — ANESTHESIA EVENT (OUTPATIENT)
Dept: INTERVENTIONAL RADIOLOGY/VASCULAR | Facility: HOSPITAL | Age: 68
DRG: 035 | End: 2023-09-28
Payer: MEDICARE

## 2023-09-28 ENCOUNTER — HOSPITAL ENCOUNTER (EMERGENCY)
Facility: HOSPITAL | Age: 68
Discharge: OTHER INSTITUTION | DRG: 035 | End: 2023-09-28
Attending: EMERGENCY MEDICINE | Admitting: EMERGENCY MEDICINE
Payer: COMMERCIAL

## 2023-09-28 ENCOUNTER — APPOINTMENT (OUTPATIENT)
Dept: GENERAL RADIOLOGY | Facility: HOSPITAL | Age: 68
DRG: 035 | End: 2023-09-28
Payer: MEDICARE

## 2023-09-28 VITALS
DIASTOLIC BLOOD PRESSURE: 75 MMHG | HEART RATE: 86 BPM | SYSTOLIC BLOOD PRESSURE: 128 MMHG | RESPIRATION RATE: 20 BRPM | BODY MASS INDEX: 33.73 KG/M2 | WEIGHT: 209.9 LBS | HEIGHT: 66 IN | OXYGEN SATURATION: 94 % | TEMPERATURE: 97.6 F

## 2023-09-28 DIAGNOSIS — Z86.79 HISTORY OF HYPERTENSION: ICD-10-CM

## 2023-09-28 DIAGNOSIS — Z92.89 HISTORY OF THROMBOLYTIC THERAPY: ICD-10-CM

## 2023-09-28 DIAGNOSIS — I63.9 RIGHT HEMISPHERE, CEREBRAL INFARCTION: Primary | ICD-10-CM

## 2023-09-28 DIAGNOSIS — I65.21 INTERNAL CAROTID ARTERY OCCLUSION, RIGHT: ICD-10-CM

## 2023-09-28 DIAGNOSIS — R33.9 URINARY RETENTION: Primary | ICD-10-CM

## 2023-09-28 DIAGNOSIS — I63.511 ACUTE CEREBROVASCULAR ACCIDENT (CVA) DUE TO OCCLUSION OF RIGHT MIDDLE CEREBRAL ARTERY: ICD-10-CM

## 2023-09-28 LAB
ALBUMIN SERPL-MCNC: 4.4 G/DL (ref 3.5–5.2)
ALBUMIN/GLOB SERPL: 1.5 G/DL
ALP SERPL-CCNC: 72 U/L (ref 39–117)
ALT SERPL W P-5'-P-CCNC: 30 U/L (ref 1–41)
ANION GAP SERPL CALCULATED.3IONS-SCNC: 14 MMOL/L (ref 5–15)
APTT PPP: 20.2 SECONDS (ref 24–31)
AST SERPL-CCNC: 24 U/L (ref 1–40)
BASOPHILS # BLD AUTO: 0.1 10*3/MM3 (ref 0–0.2)
BASOPHILS NFR BLD AUTO: 0.6 % (ref 0–1.5)
BILIRUB SERPL-MCNC: 0.3 MG/DL (ref 0–1.2)
BUN SERPL-MCNC: 16 MG/DL (ref 8–23)
BUN/CREAT SERPL: 13.8 (ref 7–25)
CALCIUM SPEC-SCNC: 9.8 MG/DL (ref 8.6–10.5)
CHLORIDE SERPL-SCNC: 102 MMOL/L (ref 98–107)
CO2 SERPL-SCNC: 24 MMOL/L (ref 22–29)
CREAT SERPL-MCNC: 1.16 MG/DL (ref 0.76–1.27)
DEPRECATED RDW RBC AUTO: 45.9 FL (ref 37–54)
EGFRCR SERPLBLD CKD-EPI 2021: 69.5 ML/MIN/1.73
EOSINOPHIL # BLD AUTO: 0.3 10*3/MM3 (ref 0–0.4)
EOSINOPHIL NFR BLD AUTO: 2.9 % (ref 0.3–6.2)
ERYTHROCYTE [DISTWIDTH] IN BLOOD BY AUTOMATED COUNT: 13.8 % (ref 12.3–15.4)
GLOBULIN UR ELPH-MCNC: 2.9 GM/DL
GLUCOSE BLDC GLUCOMTR-MCNC: 115 MG/DL (ref 70–105)
GLUCOSE BLDC GLUCOMTR-MCNC: 129 MG/DL (ref 70–130)
GLUCOSE SERPL-MCNC: 105 MG/DL (ref 65–99)
HCT VFR BLD AUTO: 43.2 % (ref 37.5–51)
HGB BLD-MCNC: 14.6 G/DL (ref 13–17.7)
HOLD SPECIMEN: NORMAL
HOLD SPECIMEN: NORMAL
INR PPP: 0.96 (ref 0.93–1.1)
LYMPHOCYTES # BLD AUTO: 4.7 10*3/MM3 (ref 0.7–3.1)
LYMPHOCYTES NFR BLD AUTO: 45.7 % (ref 19.6–45.3)
MCH RBC QN AUTO: 32.7 PG (ref 26.6–33)
MCHC RBC AUTO-ENTMCNC: 33.9 G/DL (ref 31.5–35.7)
MCV RBC AUTO: 96.4 FL (ref 79–97)
MONOCYTES # BLD AUTO: 1.2 10*3/MM3 (ref 0.1–0.9)
MONOCYTES NFR BLD AUTO: 11.9 % (ref 5–12)
NEUTROPHILS NFR BLD AUTO: 38.9 % (ref 42.7–76)
NEUTROPHILS NFR BLD AUTO: 4 10*3/MM3 (ref 1.7–7)
NRBC BLD AUTO-RTO: 0.1 /100 WBC (ref 0–0.2)
PLATELET # BLD AUTO: 359 10*3/MM3 (ref 140–450)
PMV BLD AUTO: 7.7 FL (ref 6–12)
POTASSIUM SERPL-SCNC: 3.6 MMOL/L (ref 3.5–5.2)
PROT SERPL-MCNC: 7.3 G/DL (ref 6–8.5)
PROTHROMBIN TIME: 10.5 SECONDS (ref 9.6–11.7)
RBC # BLD AUTO: 4.48 10*6/MM3 (ref 4.14–5.8)
SODIUM SERPL-SCNC: 140 MMOL/L (ref 136–145)
TROPONIN T SERPL HS-MCNC: 10 NG/L
WBC NRBC COR # BLD: 10.2 10*3/MM3 (ref 3.4–10.8)
WHOLE BLOOD HOLD COAG: NORMAL
WHOLE BLOOD HOLD SPECIMEN: NORMAL

## 2023-09-28 PROCEDURE — 99285 EMERGENCY DEPT VISIT HI MDM: CPT

## 2023-09-28 PROCEDURE — C1876 STENT, NON-COA/NON-COV W/DEL: HCPCS

## 2023-09-28 PROCEDURE — 71045 X-RAY EXAM CHEST 1 VIEW: CPT

## 2023-09-28 PROCEDURE — C1894 INTRO/SHEATH, NON-LASER: HCPCS

## 2023-09-28 PROCEDURE — C1757 CATH, THROMBECTOMY/EMBOLECT: HCPCS

## 2023-09-28 PROCEDURE — C1725 CATH, TRANSLUMIN NON-LASER: HCPCS

## 2023-09-28 PROCEDURE — B313YZZ FLUOROSCOPY OF RIGHT COMMON CAROTID ARTERY USING OTHER CONTRAST: ICD-10-PCS | Performed by: RADIOLOGY

## 2023-09-28 PROCEDURE — 85730 THROMBOPLASTIN TIME PARTIAL: CPT | Performed by: EMERGENCY MEDICINE

## 2023-09-28 PROCEDURE — 70450 CT HEAD/BRAIN W/O DYE: CPT

## 2023-09-28 PROCEDURE — 99284 EMERGENCY DEPT VISIT MOD MDM: CPT | Performed by: NURSE PRACTITIONER

## 2023-09-28 PROCEDURE — 99255 IP/OBS CONSLTJ NEW/EST HI 80: CPT | Performed by: STUDENT IN AN ORGANIZED HEALTH CARE EDUCATION/TRAINING PROGRAM

## 2023-09-28 PROCEDURE — 25010000002 ONDANSETRON PER 1 MG: Performed by: ANESTHESIOLOGY

## 2023-09-28 PROCEDURE — 25010000002 PROPOFOL 10 MG/ML EMULSION: Performed by: ANESTHESIOLOGY

## 2023-09-28 PROCEDURE — 25010000002 EPTIFIBATIDE PER 5 MG: Performed by: ANESTHESIOLOGY

## 2023-09-28 PROCEDURE — 82948 REAGENT STRIP/BLOOD GLUCOSE: CPT

## 2023-09-28 PROCEDURE — 25010000002 TENECTEPLASE PER 50 MG

## 2023-09-28 PROCEDURE — C1769 GUIDE WIRE: HCPCS

## 2023-09-28 PROCEDURE — 37215 TRANSCATH STENT CCA W/EPS: CPT | Performed by: RADIOLOGY

## 2023-09-28 PROCEDURE — 25010000002 LABETALOL 5 MG/ML SOLUTION: Performed by: INTERNAL MEDICINE

## 2023-09-28 PROCEDURE — 25010000002 HEPARIN (PORCINE) PER 1000 UNITS: Performed by: RADIOLOGY

## 2023-09-28 PROCEDURE — C1884 EMBOLIZATION PROTECT SYST: HCPCS

## 2023-09-28 PROCEDURE — 25510000001 IOPAMIDOL PER 1 ML: Performed by: EMERGENCY MEDICINE

## 2023-09-28 PROCEDURE — 3E03317 INTRODUCTION OF OTHER THROMBOLYTIC INTO PERIPHERAL VEIN, PERCUTANEOUS APPROACH: ICD-10-PCS | Performed by: PSYCHIATRY & NEUROLOGY

## 2023-09-28 PROCEDURE — C1760 CLOSURE DEV, VASC: HCPCS

## 2023-09-28 PROCEDURE — 84484 ASSAY OF TROPONIN QUANT: CPT | Performed by: EMERGENCY MEDICINE

## 2023-09-28 PROCEDURE — 25010000002 DEXAMETHASONE PER 1 MG: Performed by: ANESTHESIOLOGY

## 2023-09-28 PROCEDURE — 0042T HC CT CEREBRAL PERFUSION W/WO CONTRAST: CPT

## 2023-09-28 PROCEDURE — 4A03X5D MEASUREMENT OF ARTERIAL FLOW, INTRACRANIAL, EXTERNAL APPROACH: ICD-10-PCS | Performed by: RADIOLOGY

## 2023-09-28 PROCEDURE — 0 IODIXANOL PER 1 ML: Performed by: RADIOLOGY

## 2023-09-28 PROCEDURE — C1887 CATHETER, GUIDING: HCPCS

## 2023-09-28 PROCEDURE — 80053 COMPREHEN METABOLIC PANEL: CPT | Performed by: EMERGENCY MEDICINE

## 2023-09-28 PROCEDURE — 85610 PROTHROMBIN TIME: CPT | Performed by: EMERGENCY MEDICINE

## 2023-09-28 PROCEDURE — 85025 COMPLETE CBC W/AUTO DIFF WBC: CPT | Performed by: EMERGENCY MEDICINE

## 2023-09-28 PROCEDURE — 70498 CT ANGIOGRAPHY NECK: CPT

## 2023-09-28 PROCEDURE — 25010000002 NEOSTIGMINE 5 MG/10ML SOLUTION: Performed by: ANESTHESIOLOGY

## 2023-09-28 PROCEDURE — 3E053PZ INTRODUCTION OF PLATELET INHIBITOR INTO PERIPHERAL ARTERY, PERCUTANEOUS APPROACH: ICD-10-PCS | Performed by: RADIOLOGY

## 2023-09-28 PROCEDURE — 70496 CT ANGIOGRAPHY HEAD: CPT

## 2023-09-28 PROCEDURE — 037K3DZ DILATION OF RIGHT INTERNAL CAROTID ARTERY WITH INTRALUMINAL DEVICE, PERCUTANEOUS APPROACH: ICD-10-PCS | Performed by: RADIOLOGY

## 2023-09-28 RX ORDER — ONDANSETRON 2 MG/ML
4 INJECTION INTRAMUSCULAR; INTRAVENOUS EVERY 6 HOURS PRN
Status: DISCONTINUED | OUTPATIENT
Start: 2023-09-28 | End: 2023-10-01 | Stop reason: HOSPADM

## 2023-09-28 RX ORDER — BISACODYL 10 MG
10 SUPPOSITORY, RECTAL RECTAL DAILY PRN
Status: DISCONTINUED | OUTPATIENT
Start: 2023-09-28 | End: 2023-10-01 | Stop reason: HOSPADM

## 2023-09-28 RX ORDER — SODIUM CHLORIDE 9 MG/ML
100 INJECTION, SOLUTION INTRAVENOUS CONTINUOUS
Status: DISCONTINUED | OUTPATIENT
Start: 2023-09-28 | End: 2023-10-01 | Stop reason: HOSPADM

## 2023-09-28 RX ORDER — FENTANYL CITRATE 50 UG/ML
50 INJECTION, SOLUTION INTRAMUSCULAR; INTRAVENOUS
Status: DISCONTINUED | OUTPATIENT
Start: 2023-09-28 | End: 2023-09-28 | Stop reason: HOSPADM

## 2023-09-28 RX ORDER — ONDANSETRON 2 MG/ML
4 INJECTION INTRAMUSCULAR; INTRAVENOUS ONCE AS NEEDED
Status: COMPLETED | OUTPATIENT
Start: 2023-09-28 | End: 2023-09-28

## 2023-09-28 RX ORDER — HYDROCODONE BITARTRATE AND ACETAMINOPHEN 5; 325 MG/1; MG/1
1 TABLET ORAL ONCE AS NEEDED
Status: DISCONTINUED | OUTPATIENT
Start: 2023-09-28 | End: 2023-09-28 | Stop reason: HOSPADM

## 2023-09-28 RX ORDER — IODIXANOL 320 MG/ML
200 INJECTION, SOLUTION INTRAVASCULAR
Status: COMPLETED | OUTPATIENT
Start: 2023-09-28 | End: 2023-09-28

## 2023-09-28 RX ORDER — SODIUM CHLORIDE 0.9 % (FLUSH) 0.9 %
10 SYRINGE (ML) INJECTION AS NEEDED
Status: DISCONTINUED | OUTPATIENT
Start: 2023-09-28 | End: 2023-10-01 | Stop reason: HOSPADM

## 2023-09-28 RX ORDER — EPTIFIBATIDE 0.75 MG/ML
INJECTION, SOLUTION INTRAVENOUS CONTINUOUS PRN
Status: DISCONTINUED | OUTPATIENT
Start: 2023-09-28 | End: 2023-09-28 | Stop reason: SURG

## 2023-09-28 RX ORDER — EPHEDRINE SULFATE 50 MG/ML
5 INJECTION, SOLUTION INTRAVENOUS ONCE AS NEEDED
Status: DISCONTINUED | OUTPATIENT
Start: 2023-09-28 | End: 2023-09-28 | Stop reason: HOSPADM

## 2023-09-28 RX ORDER — BISACODYL 5 MG/1
5 TABLET, DELAYED RELEASE ORAL DAILY PRN
Status: DISCONTINUED | OUTPATIENT
Start: 2023-09-28 | End: 2023-10-01 | Stop reason: HOSPADM

## 2023-09-28 RX ORDER — HYDROMORPHONE HYDROCHLORIDE 1 MG/ML
0.5 INJECTION, SOLUTION INTRAMUSCULAR; INTRAVENOUS; SUBCUTANEOUS
Status: DISCONTINUED | OUTPATIENT
Start: 2023-09-28 | End: 2023-09-28 | Stop reason: HOSPADM

## 2023-09-28 RX ORDER — ROCURONIUM BROMIDE 10 MG/ML
INJECTION, SOLUTION INTRAVENOUS AS NEEDED
Status: DISCONTINUED | OUTPATIENT
Start: 2023-09-28 | End: 2023-09-28 | Stop reason: SURG

## 2023-09-28 RX ORDER — POLYETHYLENE GLYCOL 3350 17 G/17G
17 POWDER, FOR SOLUTION ORAL DAILY PRN
Status: DISCONTINUED | OUTPATIENT
Start: 2023-09-28 | End: 2023-10-01 | Stop reason: HOSPADM

## 2023-09-28 RX ORDER — SODIUM CHLORIDE 0.9 % (FLUSH) 0.9 %
10 SYRINGE (ML) INJECTION ONCE
Status: COMPLETED | OUTPATIENT
Start: 2023-09-28 | End: 2023-09-28

## 2023-09-28 RX ORDER — OXYCODONE AND ACETAMINOPHEN 7.5; 325 MG/1; MG/1
1 TABLET ORAL EVERY 4 HOURS PRN
Status: DISCONTINUED | OUTPATIENT
Start: 2023-09-28 | End: 2023-09-28 | Stop reason: HOSPADM

## 2023-09-28 RX ORDER — SODIUM CHLORIDE 9 MG/ML
40 INJECTION, SOLUTION INTRAVENOUS AS NEEDED
Status: DISCONTINUED | OUTPATIENT
Start: 2023-09-28 | End: 2023-10-01 | Stop reason: HOSPADM

## 2023-09-28 RX ORDER — DEXAMETHASONE SODIUM PHOSPHATE 4 MG/ML
INJECTION, SOLUTION INTRA-ARTICULAR; INTRALESIONAL; INTRAMUSCULAR; INTRAVENOUS; SOFT TISSUE AS NEEDED
Status: DISCONTINUED | OUTPATIENT
Start: 2023-09-28 | End: 2023-09-28 | Stop reason: SURG

## 2023-09-28 RX ORDER — PROPOFOL 10 MG/ML
VIAL (ML) INTRAVENOUS AS NEEDED
Status: DISCONTINUED | OUTPATIENT
Start: 2023-09-28 | End: 2023-09-28 | Stop reason: SURG

## 2023-09-28 RX ORDER — DROPERIDOL 2.5 MG/ML
0.62 INJECTION, SOLUTION INTRAMUSCULAR; INTRAVENOUS
Status: DISCONTINUED | OUTPATIENT
Start: 2023-09-28 | End: 2023-09-28 | Stop reason: HOSPADM

## 2023-09-28 RX ORDER — AMOXICILLIN 250 MG
2 CAPSULE ORAL 2 TIMES DAILY
Status: DISCONTINUED | OUTPATIENT
Start: 2023-09-28 | End: 2023-10-01 | Stop reason: HOSPADM

## 2023-09-28 RX ORDER — PROMETHAZINE HYDROCHLORIDE 25 MG/1
25 TABLET ORAL ONCE AS NEEDED
Status: DISCONTINUED | OUTPATIENT
Start: 2023-09-28 | End: 2023-09-28 | Stop reason: HOSPADM

## 2023-09-28 RX ORDER — IPRATROPIUM BROMIDE AND ALBUTEROL SULFATE 2.5; .5 MG/3ML; MG/3ML
3 SOLUTION RESPIRATORY (INHALATION) ONCE AS NEEDED
Status: DISCONTINUED | OUTPATIENT
Start: 2023-09-28 | End: 2023-09-28 | Stop reason: HOSPADM

## 2023-09-28 RX ORDER — SODIUM CHLORIDE 0.9 % (FLUSH) 0.9 %
10 SYRINGE (ML) INJECTION EVERY 12 HOURS SCHEDULED
Status: DISCONTINUED | OUTPATIENT
Start: 2023-09-28 | End: 2023-10-01 | Stop reason: HOSPADM

## 2023-09-28 RX ORDER — HYDRALAZINE HYDROCHLORIDE 20 MG/ML
5 INJECTION INTRAMUSCULAR; INTRAVENOUS
Status: DISCONTINUED | OUTPATIENT
Start: 2023-09-28 | End: 2023-09-28 | Stop reason: HOSPADM

## 2023-09-28 RX ORDER — LABETALOL HYDROCHLORIDE 5 MG/ML
20 INJECTION, SOLUTION INTRAVENOUS
Status: DISCONTINUED | OUTPATIENT
Start: 2023-09-28 | End: 2023-10-01 | Stop reason: HOSPADM

## 2023-09-28 RX ORDER — ONDANSETRON 2 MG/ML
4 INJECTION INTRAMUSCULAR; INTRAVENOUS EVERY 6 HOURS PRN
Status: DISCONTINUED | OUTPATIENT
Start: 2023-09-28 | End: 2023-09-28 | Stop reason: SDUPTHER

## 2023-09-28 RX ORDER — LABETALOL HYDROCHLORIDE 5 MG/ML
5 INJECTION, SOLUTION INTRAVENOUS
Status: DISCONTINUED | OUTPATIENT
Start: 2023-09-28 | End: 2023-09-28 | Stop reason: HOSPADM

## 2023-09-28 RX ORDER — FLUMAZENIL 0.1 MG/ML
0.2 INJECTION INTRAVENOUS AS NEEDED
Status: DISCONTINUED | OUTPATIENT
Start: 2023-09-28 | End: 2023-09-28 | Stop reason: HOSPADM

## 2023-09-28 RX ORDER — PROMETHAZINE HYDROCHLORIDE 25 MG/1
25 SUPPOSITORY RECTAL ONCE AS NEEDED
Status: DISCONTINUED | OUTPATIENT
Start: 2023-09-28 | End: 2023-09-28 | Stop reason: HOSPADM

## 2023-09-28 RX ORDER — MONTELUKAST SODIUM 10 MG/1
10 TABLET ORAL NIGHTLY
Status: DISCONTINUED | OUTPATIENT
Start: 2023-09-28 | End: 2023-10-01 | Stop reason: HOSPADM

## 2023-09-28 RX ORDER — SODIUM CHLORIDE 0.9 % (FLUSH) 0.9 %
10 SYRINGE (ML) INJECTION AS NEEDED
Status: DISCONTINUED | OUTPATIENT
Start: 2023-09-28 | End: 2023-09-28 | Stop reason: HOSPADM

## 2023-09-28 RX ORDER — AMLODIPINE BESYLATE 5 MG/1
5 TABLET ORAL DAILY
Status: DISCONTINUED | OUTPATIENT
Start: 2023-09-28 | End: 2023-10-01 | Stop reason: HOSPADM

## 2023-09-28 RX ORDER — FLUTICASONE PROPIONATE 50 MCG
2 SPRAY, SUSPENSION (ML) NASAL DAILY
Status: DISCONTINUED | OUTPATIENT
Start: 2023-09-28 | End: 2023-10-01 | Stop reason: HOSPADM

## 2023-09-28 RX ORDER — NALOXONE HCL 0.4 MG/ML
0.2 VIAL (ML) INJECTION AS NEEDED
Status: DISCONTINUED | OUTPATIENT
Start: 2023-09-28 | End: 2023-09-28 | Stop reason: HOSPADM

## 2023-09-28 RX ORDER — CLOPIDOGREL BISULFATE 75 MG/1
300 TABLET ORAL ONCE
Status: DISCONTINUED | OUTPATIENT
Start: 2023-09-28 | End: 2023-09-29

## 2023-09-28 RX ORDER — NEOSTIGMINE METHYLSULFATE 0.5 MG/ML
INJECTION, SOLUTION INTRAVENOUS AS NEEDED
Status: DISCONTINUED | OUTPATIENT
Start: 2023-09-28 | End: 2023-09-28 | Stop reason: SURG

## 2023-09-28 RX ORDER — NITROGLYCERIN 0.4 MG/1
0.4 TABLET SUBLINGUAL
Status: DISCONTINUED | OUTPATIENT
Start: 2023-09-28 | End: 2023-10-01 | Stop reason: HOSPADM

## 2023-09-28 RX ORDER — ATORVASTATIN CALCIUM 80 MG/1
80 TABLET, FILM COATED ORAL DAILY
Status: DISCONTINUED | OUTPATIENT
Start: 2023-09-28 | End: 2023-10-01 | Stop reason: HOSPADM

## 2023-09-28 RX ORDER — INDOMETHACIN 50 MG/1
50 CAPSULE ORAL 3 TIMES DAILY PRN
Status: DISCONTINUED | OUTPATIENT
Start: 2023-09-28 | End: 2023-10-01 | Stop reason: HOSPADM

## 2023-09-28 RX ORDER — ACETAMINOPHEN 650 MG/1
650 SUPPOSITORY RECTAL EVERY 4 HOURS PRN
Status: DISCONTINUED | OUTPATIENT
Start: 2023-09-28 | End: 2023-10-01 | Stop reason: HOSPADM

## 2023-09-28 RX ORDER — GLYCOPYRROLATE 0.2 MG/ML
INJECTION INTRAMUSCULAR; INTRAVENOUS AS NEEDED
Status: DISCONTINUED | OUTPATIENT
Start: 2023-09-28 | End: 2023-09-28 | Stop reason: SURG

## 2023-09-28 RX ORDER — DIPHENHYDRAMINE HYDROCHLORIDE 50 MG/ML
12.5 INJECTION INTRAMUSCULAR; INTRAVENOUS
Status: DISCONTINUED | OUTPATIENT
Start: 2023-09-28 | End: 2023-09-28 | Stop reason: HOSPADM

## 2023-09-28 RX ORDER — ACETAMINOPHEN 325 MG/1
650 TABLET ORAL EVERY 4 HOURS PRN
Status: DISCONTINUED | OUTPATIENT
Start: 2023-09-28 | End: 2023-10-01 | Stop reason: HOSPADM

## 2023-09-28 RX ADMIN — HEPARIN SODIUM: 1000 INJECTION INTRAVENOUS; SUBCUTANEOUS at 15:34

## 2023-09-28 RX ADMIN — Medication 10 ML: at 14:13

## 2023-09-28 RX ADMIN — GLYCOPYRROLATE 0.2 MG: 0.2 INJECTION INTRAMUSCULAR; INTRAVENOUS at 15:57

## 2023-09-28 RX ADMIN — HEPARIN SODIUM: 1000 INJECTION INTRAVENOUS; SUBCUTANEOUS at 15:36

## 2023-09-28 RX ADMIN — EPTIFIBATIDE 1 MCG/KG/MIN: 0.75 INJECTION INTRAVENOUS at 16:29

## 2023-09-28 RX ADMIN — IODIXANOL 155 ML: 320 INJECTION, SOLUTION INTRAVASCULAR at 16:26

## 2023-09-28 RX ADMIN — PROPOFOL 200 MG: 10 INJECTION, EMULSION INTRAVENOUS at 15:19

## 2023-09-28 RX ADMIN — Medication 10 ML: at 20:50

## 2023-09-28 RX ADMIN — Medication 10 ML: at 14:28

## 2023-09-28 RX ADMIN — GLYCOPYRROLATE 0.6 MG: 0.2 INJECTION INTRAMUSCULAR; INTRAVENOUS at 16:33

## 2023-09-28 RX ADMIN — ONDANSETRON 4 MG: 2 INJECTION INTRAMUSCULAR; INTRAVENOUS at 15:47

## 2023-09-28 RX ADMIN — IOPAMIDOL 100 ML: 755 INJECTION, SOLUTION INTRAVENOUS at 14:32

## 2023-09-28 RX ADMIN — GLYCOPYRROLATE 0.2 MG: 0.2 INJECTION INTRAMUSCULAR; INTRAVENOUS at 15:55

## 2023-09-28 RX ADMIN — IOPAMIDOL 50 ML: 755 INJECTION, SOLUTION INTRAVENOUS at 14:26

## 2023-09-28 RX ADMIN — SODIUM CHLORIDE 600 ML: 0.9 INJECTION, SOLUTION INTRAVENOUS at 16:34

## 2023-09-28 RX ADMIN — ROCURONIUM BROMIDE 50 MG: 10 INJECTION, SOLUTION INTRAVENOUS at 15:19

## 2023-09-28 RX ADMIN — LABETALOL HYDROCHLORIDE 20 MG: 5 INJECTION, SOLUTION INTRAVENOUS at 18:05

## 2023-09-28 RX ADMIN — NEOSTIGMINE METHYLSULFATE 3 MG: 0.5 INJECTION INTRAVENOUS at 16:33

## 2023-09-28 RX ADMIN — Medication 24 MG: at 14:13

## 2023-09-28 RX ADMIN — DEXAMETHASONE SODIUM PHOSPHATE 10 MG: 4 INJECTION, SOLUTION INTRA-ARTICULAR; INTRALESIONAL; INTRAMUSCULAR; INTRAVENOUS; SOFT TISSUE at 15:48

## 2023-09-28 NOTE — ANESTHESIA PREPROCEDURE EVALUATION
Anesthesia Evaluation     NPO Solid Status: Waived due to emergency  NPO Liquid Status: Waived due to emergency           Airway   Mallampati: II  TM distance: <3 FB  Neck ROM: limited  Difficult intubation highly probable  Dental - normal exam     Pulmonary - normal exam   Cardiovascular - normal exam    (+) hypertension, hyperlipidemia      Neuro/Psych  (+) CVA, weakness  GI/Hepatic/Renal/Endo    (+) obesity    Musculoskeletal     Abdominal  - normal exam   Substance History      OB/GYN          Other                      Anesthesia Plan    ASA 4 - emergent     general     (RSI)  intravenous induction     Anesthetic plan, risks, benefits, and alternatives have been provided, discussed and informed consent has been obtained with: patient.    CODE STATUS:

## 2023-09-28 NOTE — ED PROVIDER NOTES
Subjective   History of Present Illness  68-year-old male found poorly responsive by coworkers patient was noted to be flaccid on his left side the patient had been incontinent and eyes were deviated to the left.  There was no reported seizure activity.  The patient was transported emergently to the hospital where he was assessed as a team B/code stroke.  There has been no previous history of seizures or strokelike phenomena.  The patient was not hypotensive  Last known well was approximately 30 minutes prior to arrival  Review of Systems   Unable to perform ROS: Mental status change     Past Medical History:   Diagnosis Date    Allergic     Broken neck     Colon tumor     Hyperlipidemia     Hypertension        No Known Allergies    Past Surgical History:   Procedure Laterality Date    APPENDECTOMY      COLON BIOPSY      NECK SURGERY      broken neck    SKIN GRAFT      TUMOR EXCISION      colon       Family History   Problem Relation Age of Onset    Diabetes Mother     Thyroid disease Mother     Hypertension Mother     Lung cancer Father     Heart disease Father     Hyperlipidemia Brother     Hypertension Brother     Heart disease Brother     Diabetes Brother        Social History     Socioeconomic History    Marital status:    Tobacco Use    Smoking status: Never    Smokeless tobacco: Never   Vaping Use    Vaping Use: Never used   Substance and Sexual Activity    Alcohol use: No    Drug use: No    Sexual activity: Defer           Objective   Physical Exam  Alert [place] Coma Scale 13 NIH 28   HEENT: Pupils equal and reactive to light. Conjunctivae are not injected. Normal tympanic membranes. Oropharynx and nares are normal.   Neck: Supple. Midline trachea. No JVD. No goiter.   Chest: Clear and equal breath sounds bilaterally, regular rate and rhythm without murmur or rub.   Abdomen: Positive bowel sounds, nontender, nondistended. No rebound or peritoneal signs. No CVA tenderness.   Extremities/neuro  reportedly right-handed.  The eyes are deviated to the left.  The left upper extremity and left lower extremity are flaccid no clubbing. cyanosis or edema.  Unable to do cranial nerve testing or visual field testing initially.  Asymmetric smile the full range of motion is intact   Skin: Warm and dry, no rashes or petechia.   Lymphatic: No regional lymphadenopathy. No calf pain, swelling or Homans sign    Procedures         The patient was taken emergently to CT.  He was seen there by neurology and decision was made to give TNK  ED Course           Labs Reviewed   COMPREHENSIVE METABOLIC PANEL - Abnormal; Notable for the following components:       Result Value    Glucose 105 (*)     All other components within normal limits    Narrative:     GFR Normal >60  Chronic Kidney Disease <60  Kidney Failure <15     APTT - Abnormal; Notable for the following components:    PTT 20.2 (*)     All other components within normal limits   CBC WITH AUTO DIFFERENTIAL - Abnormal; Notable for the following components:    Neutrophil % 38.9 (*)     Lymphocyte % 45.7 (*)     Lymphocytes, Absolute 4.70 (*)     Monocytes, Absolute 1.20 (*)     All other components within normal limits   POCT GLUCOSE FINGERSTICK - Abnormal; Notable for the following components:    Glucose 115 (*)     All other components within normal limits   PROTIME-INR - Normal   SINGLE HSTROPONIN T - Normal    Narrative:     High Sensitive Troponin T Reference Range:  <10.0 ng/L- Negative Female for AMI  <15.0 ng/L- Negative Male for AMI  >=10 - Abnormal Female indicating possible myocardial injury.  >=15 - Abnormal Male indicating possible myocardial injury.   Clinicians would have to utilize clinical acumen, EKG, Troponin, and serial changes to determine if it is an Acute Myocardial Infarction or myocardial injury due to an underlying chronic condition.        RAINBOW DRAW    Narrative:     The following orders were created for panel order [place] Draw.  Procedure                                Abnormality         Status                     ---------                               -----------         ------                     Green Top (Gel)[[id number]]                                  In process                 Lavender Top[[id number]]                                     In process                 Gold Top - SST[[id number]]                                   In process                 Light Blue Top[[id number]]                                   In process                   Please view results for these tests on the individual orders.   RAINBOW DRAW    Narrative:     The following orders were created for panel order [place] Draw.  Procedure                               Abnormality         Status                     ---------                               -----------         ------                     Green Top (Gel)[[id number]]                                                             Lavender Top[[id number]]                                                                Gold Top - SST[[id number]]                                                              Light Blue Top[[id number]]                                                                Please view results for these tests on the individual orders.   POCT GLUCOSE FINGERSTICK   TYPE AND SCREEN   CBC AND DIFFERENTIAL    Narrative:     The following orders were created for panel order CBC & Differential.  Procedure                               Abnormality         Status                     ---------                               -----------         ------                     CBC Auto Differential[[id number]]        Abnormal            Final result                 Please view results for these tests on the individual orders.   GREEN TOP   LAVENDER TOP   GOLD TOP - SST   LIGHT BLUE TOP   GREEN TOP   LAVENDER TOP   GOLD TOP - SST   LIGHT BLUE TOP     Medications   sodium chloride 0.9 % flush 10 mL (10 mL Intravenous Given 9/28/23  1428)   sodium chloride 0.9 % flush 10 mL (10 mL Intravenous Given 9/28/23 1413)     Followed by   tenecteplase for stroke (TNKASE) injection 24 mg (24 mg Intravenous Given 9/28/23 1413)     Followed by   sodium chloride 0.9 % flush 10 mL (10 mL Intravenous Given 9/28/23 1413)   iopamidol (ISOVUE-370) 76 % injection 100 mL (100 mL Intravenous Given 9/28/23 1432)   iopamidol (ISOVUE-370) 76 % injection 50 mL (50 mL Intravenous Given 9/28/23 1426)     CT Angiogram Neck    Result Date: 9/28/2023  Impression: Complete occlusion of the right ICA from its origin through the distal cavernous intracranial segment, with likely retrograde reconstitution. There is tandem subsequent complete occlusion of the right MCA M1 segment, with reconstitution of the anterior temporal branch and overall decreased arborization of M2 and M3 vessels through the right MCA territory, corresponding with ischemic tissue at risk on concurrently performed CT perfusion. Findings relayed to Dr. López of the emergency department by Ian Evans via the epic messaging system at 2:43 p.m. 9/28/2023. Electronically Signed: Alexi Evans MD  9/28/2023 2:43 PM EDT  Workstation ID: [id number]    CT Head Without Contrast Stroke Protocol    Result Date: 9/28/2023  Impression: 1. No acute intracranial finding. 2. Features of mild chronic microvascular disease. Electronically Signed: Melody Becerra MD  9/28/2023 2:15 PM EDT  Workstation ID: [id number]    CT Angiogram Head w AI Analysis of LVO    Result Date: 9/28/2023  Impression: Complete occlusion of the right ICA from its origin through the distal cavernous intracranial segment, with likely retrograde reconstitution. There is tandem subsequent complete occlusion of the right MCA M1 segment, with reconstitution of the anterior temporal branch and overall decreased arborization of M2 and M3 vessels through the right MCA territory, corresponding with ischemic tissue at risk on concurrently performed CT  perfusion. Findings relayed to Dr. López of the emergency department by Ian Evans via the epic messaging system at 2:43 p.m. 9/28/2023. Electronically Signed: Alexi Evans MD  9/28/2023 2:43 PM EDT  Workstation ID: [id number]    CT CEREBRAL PERFUSION WITH & WITHOUT CONTRAST    Result Date: 9/28/2023  Impression: Findings consistent with large right MCA hypoperfusion and evolving infarct. The ischemic core is approximately 12 cc volume. Large ischemic penumbra is suggested. I have already discussed the major pertinent findings with neurology team prior to the time of this dictation. Electronically Signed: Melody Becerra MD  9/28/2023 2:38 PM EDT  Workstation ID: [id number]                                   Medical Decision Making  The a I flagged as a large vessel occlusion.  The referral neurology staff at [facility] was apparently sent the images.  The ER staff was called to obtain ambulance for emergency transport. at our radiologist request I discussed the case with Dr. Steward.  He stated to send the patient to ICU at [facility],, and he would contact the intensivist.  I contacted the intensivist Dr. Mccullough who was unaware of the case but was agreeable to transfer.  The patient had improving neurologic status while in the emergency department and NIH had decreased to 20 at time of transfer.  I discussed the interventions, risk, benefits of transfer, with the family and they were agreeable to this plan of treatment    Amount and/or Complexity of Data Reviewed  Independent Historian: EMS  Labs: ordered. Decision-making details documented in ED Course.  Radiology: ordered and independent interpretation performed. Decision-making details documented in ED Course.     Details: The patient showed complete occlusion of the right RCA with also occlusion of the right M1 branch.  Perfusion showed a large defect on the right.  Initial CT was read as negative by radiologist  ECG/medicine tests: ordered and  independent interpretation performed.     Details: EKG showed sinus tachycardia with old left ventricular hypertrophy apparent on 7/5/2023 EKG    Risk  Prescription drug management.  Risk Details: Risk of progression of symptomatology    Critical Care  Total time providing critical care: 40 minutes (Please note that 40 minutes were spent with the care and stabilization of this critical care patient.  This is exclusive of any procedures performed)      Final diagnoses:   Right hemisphere, cerebral infarction   Internal carotid artery occlusion, right   History of hypertension   Acute cerebrovascular accident (CVA) due to occlusion of right middle cerebral artery   History of thrombolytic therapy       ED Disposition  ED Disposition       ED Disposition   Transfer to Another Facility     Condition   --    Comment   --               No follow-up provider specified.       Medication List      No changes were made to your prescriptions during this visit.            Ryder López MD  09/28/23 4046

## 2023-09-28 NOTE — H&P
Patient Care Team:  Cherelle Vila APRN as PCP - General (Nurse Practitioner)      Subjective     Patient is a 68 y.o. male.  Who was transferred to this hospital from [facility] after presenting there with acute stroke the stroke service excepted the patient and were asked to admit he went straight to the interventional unit on arrival for cerebral angiogram they found a 90% internal carotid artery stenosis and had cast placement with no residual stenosis with an Emblon embolus that resolved with TNKase.  Patient was at work he was taking a picture of something with his phone and he just slid down the wall he just could not stand up coworkers could not get him up.  He had no true loss of consciousness no bowel or bladder incontinence no seizure activity apparently he has had no history of prior strokes he has hypertension and hyperlipidemia for which she takes treatment and has been pretty well controlled he is a prediabetic.  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  He is a never smoker he does not drink or use illicit drugs.  No history of heart kidney liver disease no lung disease he is not have any chest pain or shortness of breath no melena hematochezia no dysuria hematuria no easy bleeding or bruising problems.  He has a little sore throat right now they just extubated him a short time ago post procedure.  No headache no nausea no acute visual changes    Review of Systems:  See above      History  Past Medical History:   Diagnosis Date    Allergic     Broken neck     Colon tumor     Hyperlipidemia     Hypertension      Past Surgical History:   Procedure Laterality Date    APPENDECTOMY      COLON BIOPSY      NECK SURGERY      broken neck    SKIN GRAFT      TUMOR EXCISION      colon     Social History     Socioeconomic History    Marital status:    Tobacco Use    Smoking status: Never    Smokeless tobacco: Never   Vaping Use    Vaping Use: Never used   Substance and Sexual Activity     Alcohol use: No    Drug use: No    Sexual activity: Defer     Family History   Problem Relation Age of Onset    Diabetes Mother     Thyroid disease Mother     Hypertension Mother     Lung cancer Father     Heart disease Father     Hyperlipidemia Brother     Hypertension Brother     Heart disease Brother     Diabetes Brother          Allergies:  Patient has no known allergies.    Medications:  Prior to Admission medications    Medication Sig Start Date End Date Taking? Authorizing Provider   amLODIPine (NORVASC) 5 MG tablet TAKE 1 TABLET BY MOUTH DAILY 6/5/23   Cherelle Vila APRN   ascorbic acid (VITAMIN C) 100 MG tablet  VITAMIN C TABS 9/21/16   Melanie Patel MD   Cholecalciferol 1000 units capsule  VITAMIN D3 CAPS 9/21/16   Melanie Patel MD   fluticasone (Flonase Allergy Relief) 50 MCG/ACT nasal spray 2 sprays into the nostril(s) as directed by provider Daily. 4/29/20   Cherelle Vila APRN   indomethacin (INDOCIN) 50 MG capsule Take 1 capsule by mouth 3 (Three) Times a Day As Needed for Mild Pain . 8/12/22   Cherelle Vila APRN   lovastatin (MEVACOR) 40 MG tablet TAKE 2 TABLETS BY MOUTH EVERY NIGHT 6/5/23   Cherelle Vila APRN   montelukast (SINGULAIR) 10 MG tablet TAKE 1 TABLET BY MOUTH EVERY NIGHT 6/5/23   Cherelle Vila APRN   Multiple Vitamins-Minerals (QC MENS DAILY MULTIVITAMIN) tablet QC MENS DAILY MULTIVITAMIN TABS 9/21/16   Melanie Patel MD   mupirocin (BACTROBAN) 2 % ointment APPLY EXTERNALLY TO THE AFFECTED AREA ON LEGS TWICE DAILY UNTIL HEALED 6/19/23   Melanie Patel MD   Omega-3 Fatty Acids (FISH OIL) 1000 MG capsule capsule Take 1 capsule by mouth Daily With Breakfast.    Melanie Patel MD             Objective     Vital Signs  Vital Sign Min/Max for last 24 hours  Temp  Min: 97.6 °F (36.4 °C)  Max: 97.6 °F (36.4 °C)   BP  Min: 128/75  Max: 157/92   Pulse  Min: 86  Max: 106   Resp  Min: 18  Max: 20   SpO2  Min: 90 %  Max: 99 %   No data recorded    Weight  Min: 95.2 kg (209 lb 14.4 oz)  Max: 95.2 kg (209 lb 14.4 oz)     No intake or output data in the 24 hours ending 09/28/23 1702  No intake/output data recorded.  Last Weight and Admission Weight    There were no vitals filed for this visit.      There is no height or weight on file to calculate BMI.           Physical Exam:  General Appearance: Well-developed white male he is resting in bed he does not appear in any acute distress  Eyes: Conjunctiva are clear and anicteric pupils are equal and reactive and extraocular muscles are intact  ENT: He has a mild left facial droop he has a little laceration on the right lateral tongue, Mallampati type II airway almost 3, nasal septum midline  Neck: No adenopathy or thyromegaly no visible jugular venous tension and trachea midline  Lungs: Clear nonlabored symmetric expansion  Cardiac: Regular rate rhythm no murmur  Abdomen: Soft nontender no palpable hepatosplenomegaly or masses  : Not examined  Musculoskeletal: Grossly normal  Skin: Warm and dry no jaundice no petechiae  Neuro: He is alert and oriented x3 he is cooperative no pronator drift excellent  and does finger-nose to finger without difficulty.  Again he has a subtle left facial droop and his speech is just a little bit slurred.  He is able to do dorsiflexion and plantarflexion with his legs I did not have him do straight leg raises he just had his catheter pulled from that right groin.  Extremities/P Vascular: No clubbing no cyanosis no edema palpable radial and dorsalis pedis pulses his right cath site get a clean dry dressing there is no mass or abnormal pulsation.  MSE: He is appropriate      Labs:  Results from last 7 days   Lab Units 09/28/23  1403   GLUCOSE mg/dL 105*   SODIUM mmol/L 140   POTASSIUM mmol/L 3.6   CO2 mmol/L 24.0   CHLORIDE mmol/L 102   ANION GAP mmol/L 14.0   CREATININE mg/dL 1.16   BUN mg/dL 16   BUN / CREAT RATIO  13.8   CALCIUM mg/dL 9.8   ALK PHOS U/L 72   TOTAL PROTEIN g/dL  7.3   ALT (SGPT) U/L 30   AST (SGOT) U/L 24   BILIRUBIN mg/dL 0.3   ALBUMIN g/dL 4.4   GLOBULIN gm/dL 2.9     Estimated Creatinine Clearance: 67.7 mL/min (by C-G formula based on SCr of 1.16 mg/dL).      Results from last 7 days   Lab Units 09/28/23  1403   WBC 10*3/mm3 10.20   RBC 10*6/mm3 4.48   HEMOGLOBIN g/dL 14.6   HEMATOCRIT % 43.2   MCV fL 96.4   MCH pg 32.7   MCHC g/dL 33.9   RDW % 13.8   RDW-SD fl 45.9   MPV fL 7.7   PLATELETS 10*3/mm3 359   NEUTROPHIL % % 38.9*   LYMPHOCYTE % % 45.7*   MONOCYTES % % 11.9   EOSINOPHIL % % 2.9   BASOPHIL % % 0.6   NEUTROS ABS 10*3/mm3 4.00   LYMPHS ABS 10*3/mm3 4.70*   MONOS ABS 10*3/mm3 1.20*   EOS ABS 10*3/mm3 0.30   BASOS ABS 10*3/mm3 0.10   NRBC /100 WBC 0.1         Results from last 7 days   Lab Units 09/28/23  1403   HSTROP T ng/L 10                 Results from last 7 days   Lab Units 09/28/23  1403   INR  0.96     Microbiology Results (last 10 days)       ** No results found for the last 240 hours. **              Diagnostics:  CT Angiogram Neck    Result Date: 9/28/2023  CT ANGIOGRAM HEAD W AI ANALYSIS OF LVO, CT ANGIOGRAM NECK Date of Exam: 9/28/2023 2:23 PM EDT Indication: Stroke, follow up Neuro deficit, acute stroke suspected Acute Stroke. Comparison: None available. Technique: CTA of the head and neck was performed after the uneventful intravenous administration of iodinated contrast. Reconstructed coronal and sagittal images were also obtained. In addition, a 3-D volume rendered image was created for interpretation. Automated exposure control and iterative reconstruction methods were used. Findings: The lung apices are clear. Evaluation of the neck soft tissues demonstrates no pathologic cervical adenopathy or unexpected aerodigestive tract mass. The osseous structures demonstrate no evidence of acute fracture or aggressive osseous lesion with spondylosis change and prior cerclage wire fusion of C1-C3 noted posteriorly. Patent aortic arch with three-vessel  branching. The visualized subclavian arteries are patent bilaterally. The right ICA is completely occluded through the intracranial segment. The left ICA demonstrates mild calcific atherosclerosis, with less than 50% narrowing by NASCET criteria. The vertebral arteries are normal in course and caliber bilaterally. Intracranially, there is occlusion of the right ICA, with some faint reconstitution of the distal cavernous segment, likely via patent anterior communicating and posterior communicating arteries. The right middle cerebral artery demonstrates some focal mild to moderate narrowing proximally, with subsequent complete occlusion. The anterior temporal M2 branch reconstitutes and there is some likely collateral filling of additional faint sylvian M2 branches. The left middle cerebral artery demonstrates some mild to moderate narrowing of the proximal anterior temporal M2 branch otherwise without evidence of large vessel occlusion or aneurysm. The vertebrobasilar system is patent. The posterior cerebral arteries demonstrate moderate narrowing of the right P1 segment, otherwise patent.     Impression: Complete occlusion of the right ICA from its origin through the distal cavernous intracranial segment, with likely retrograde reconstitution. There is tandem subsequent complete occlusion of the right MCA M1 segment, with reconstitution of the anterior temporal branch and overall decreased arborization of M2 and M3 vessels through the right MCA territory, corresponding with ischemic tissue at risk on concurrently performed CT perfusion. Findings relayed to Dr. López of the emergency department by Ian Evans via the epic messaging system at 2:43 p.m. 9/28/2023. Electronically Signed: Alexi Evans MD  9/28/2023 2:43 PM EDT  Workstation ID: [id number]    XR Chest 1 View    Result Date: 9/28/2023  XR CHEST 1 VW Date of Exam: 9/28/2023 2:40 PM EDT Indication: Acute Stroke Protocol (onset < 12 hrs) Comparison: PA  and lateral chest 5/26/2021 Findings: No acute airspace disease. Heart size is within normal limits. No pleural effusion or pneumothorax or acute osseous abnormality. Surgical changes are seen over the upper neck. Degenerative endplate spurring is present in the spine.     Impression: No acute cardiopulmonary findings. Electronically Signed: Melody Becerra MD  9/28/2023 2:59 PM EDT  Workstation ID: [id number]    CT Head Without Contrast Stroke Protocol    Result Date: 9/28/2023  CT HEAD WO CONTRAST STROKE PROTOCOL Date of Exam: 9/28/2023 2:07 PM EDT Indication: Neuro deficit, acute stroke suspected Neuro deficit, acute, stroke suspected. Collapsed at work. Slurred speech. Comparison: None Technique: Axial CT images were obtained of the head without contrast administration.  Reconstructed coronal and sagittal images were also obtained. Automated exposure control and iterative construction methods were used. Scan Time: 9/28/2023 at 1407 Results discussed with Dr. López in the emergency room at 9/28/2023 at 1407. Findings: Some of the images are degraded by motion. Repeated attempts at imaging were made. Preservation of the gray matter-white matter junction distinction. There is no CT evidence of acute or evolving large territory infarct. Mild deep white matter hypodensities are nonspecific but favored to represent changes of chronic microvascular disease. No mass lesion or mass effect or midline shift is seen. No intra evident. Surgical changes are seen within the posterior C1 vertebrae. No acute calvarial abnormality. Mild left maxillary sinus mucous retention cyst or polyps. Mastoid air cells are clear. No acute calvarial abnormality.     Impression: 1. No acute intracranial finding. 2. Features of mild chronic microvascular disease. Electronically Signed: Melody Becerra MD  9/28/2023 2:15 PM EDT  Workstation ID: [id number]    CT Angiogram Head w AI Analysis of LVO    Result Date: 9/28/2023  CT ANGIOGRAM HEAD W AI  ANALYSIS OF LVO, CT ANGIOGRAM NECK Date of Exam: 9/28/2023 2:23 PM EDT Indication: Stroke, follow up Neuro deficit, acute stroke suspected Acute Stroke. Comparison: None available. Technique: CTA of the head and neck was performed after the uneventful intravenous administration of iodinated contrast. Reconstructed coronal and sagittal images were also obtained. In addition, a 3-D volume rendered image was created for interpretation. Automated exposure control and iterative reconstruction methods were used. Findings: The lung apices are clear. Evaluation of the neck soft tissues demonstrates no pathologic cervical adenopathy or unexpected aerodigestive tract mass. The osseous structures demonstrate no evidence of acute fracture or aggressive osseous lesion with spondylosis change and prior cerclage wire fusion of C1-C3 noted posteriorly. Patent aortic arch with three-vessel branching. The visualized subclavian arteries are patent bilaterally. The right ICA is completely occluded through the intracranial segment. The left ICA demonstrates mild calcific atherosclerosis, with less than 50% narrowing by NASCET criteria. The vertebral arteries are normal in course and caliber bilaterally. Intracranially, there is occlusion of the right ICA, with some faint reconstitution of the distal cavernous segment, likely via patent anterior communicating and posterior communicating arteries. The right middle cerebral artery demonstrates some focal mild to moderate narrowing proximally, with subsequent complete occlusion. The anterior temporal M2 branch reconstitutes and there is some likely collateral filling of additional faint sylvian M2 branches. The left middle cerebral artery demonstrates some mild to moderate narrowing of the proximal anterior temporal M2 branch otherwise without evidence of large vessel occlusion or aneurysm. The vertebrobasilar system is patent. The posterior cerebral arteries demonstrate moderate narrowing  of the right P1 segment, otherwise patent.     Impression: Complete occlusion of the right ICA from its origin through the distal cavernous intracranial segment, with likely retrograde reconstitution. There is tandem subsequent complete occlusion of the right MCA M1 segment, with reconstitution of the anterior temporal branch and overall decreased arborization of M2 and M3 vessels through the right MCA territory, corresponding with ischemic tissue at risk on concurrently performed CT perfusion. Findings relayed to Dr. López of the emergency department by Ian Evans via the epic messaging system at 2:43 p.m. 9/28/2023. Electronically Signed: Alexi Evans MD  9/28/2023 2:43 PM EDT  Workstation ID: [id number]    CT CEREBRAL PERFUSION WITH & WITHOUT CONTRAST    Result Date: 9/28/2023  CT CEREBRAL PERFUSION W WO CONTRAST Date of Exam: 9/28/2023 2:22 PM EDT Indication: Neuro deficit, acute, stroke suspected.  Comparison: CT head without contrast 9/28/2023. Technique: Axial CT images of the brain were obtained prior to and after the administration of iodinated contrast. CT Perfusion protocol was utilized. Automated post processing was performed by RAPID software and submitted to PACS for interpretation. Automated exposure control and iterative reconstruction was utilized. Findings: On source images, large perfusion defect is demonstrated within the right frontal, parietal and temporal lobes consistent with large territory right MCA ischemia/infarct. Cerebral blood flow less than 30%: 12 mL Tmax greater than 6 seconds: 160 mL Mismatch volume: 148 mL Mismatch ratio: 13 mL     Impression: Findings consistent with large right MCA hypoperfusion and evolving infarct. The ischemic core is approximately 12 cc volume. Large ischemic penumbra is suggested. I have already discussed the major pertinent findings with neurology team prior to the time of this dictation. Electronically Signed: Melody Becerra MD  9/28/2023 2:38 PM  EDT  Workstation ID: [id number]            Assessment & Plan     Acute right MCA infarct status post TNKase and cerebral angiogram clot had dissolved there was a 90% LEELEE stenosis treated with SUNDAR excessively.  Will follow neurosurgery's goals for management patient on Integrilin drip now.  We will defer any further work-up to the stroke service.  Hypertension nursing reports neurosurgery requested blood pressure under 140 we will put orders for this.  Hyperlipidemia statin therapy high-dose      Gabe Mccullough Jr, MD  09/28/23  17:02 EDT    Time:

## 2023-09-28 NOTE — BRIEF OP NOTE
Progress Note    Abel Tafoya      Pre-op Diagnosis:   Acute Stroke       Post-Op Diagnosis Codes:  Same    Procedure/CPT® Codes:    LEELEE Origin Stent Placement under Distal Protection    Anesthesia: General ETT Anesthesia    Staff:   Radiology Nurse: Zulma Robb RN; Karen Goode RN  Radiologist: Grady Flannery MD  Vascular Radiology Technician: Tiffany Roman, ONEAL; Lonnie Chino; Salvador Bernal         Estimated Blood Loss: 200ml    Urine Voided: * No values recorded between 9/28/2023 12:00 AM and 9/28/2023  4:42 PM *    Specimens:                None          Drains: * No LDAs found *    Findings: 90% LEELEE origin stenosis with successful SUNDAR placement under DP and no residual stenosis. M1 embolus resolved with the TNK. No distal cutoff or embolus seen. No A1 segment seen.        Complications: None encountered.          Grady Flannery MD     Date: 9/28/2023  Time: 16:42 EDT

## 2023-09-28 NOTE — CODE DOCUMENTATION
"Incoming Team D from [facility]    1424 \"Go activate\" received for acute Right ICA and MCA occlusion  1426 Dr Mccullough updated  1429 Called [facility] for report.  RN not available.  Charito is to call back  1440 Received report from [facility] TREVINO.  Pt found down at work with Left facial droop, L side flaccis, aphasic, and had R gaze.  NIH 26  Received TNK at 1413  1449 Pt on the way via EMS  1512 Arrived Via ems   1515 Arrived in IR    "

## 2023-09-28 NOTE — CONSULTS
Neurology Consult Note    Consult Date: 9/28/2023    Referring MD: Grady Flannery MD    Reason for Consult I have been asked to see the patient in neurological consultation to render advice and opinion regarding left-sided weakness    Abel Tafoya is a 68 y.o. male with past medical history of hypertension and hyperlipidemia.  He was at work working as a  , his friend noticed him slumping over with left-sided weakness last known normal was 5 minutes before he saw him so the he immediately alerted the EMS who brought him to [facility].  Very had a full-blown right MCA syndrome after making sure blood pressure less than 180, CT head no acute abnormality was immediately given TNK.    His CTA head and neck showed right ICA occlusion with tandem occlusion of right M1, CT CT perfusion large penumbra of 140 mL.  He was immediately transferred to [facility] for mechanical thrombectomy.    Past Medical History:   Diagnosis Date    Allergic     Broken neck     Colon tumor     Hyperlipidemia     Hypertension        Exam  BP (!) 139/119   Pulse 80   Temp 98.7 °F (37.1 °C) (Axillary)   Resp 20   SpO2 98%     Higher integrative function: Oriented to time, place, person.  Normal comprehension, repetition and fluency  CN II: Normal visual acuity   CN III IV VI: Extraocular movements are full without nystagmus. Pupils are equal, round, and reactive to light.   CN V: Sensation same on both sides of the face  CN VII: Left facial droop  Motor: Left upper and left lower 4+/5 in right upper and right lower 5/5 no pronator drift. No fasciculations, rigidity, spasticity or abnormal movements.   Sensation: Normal sensation to pin prick and light touch all four extremities   Coordination: Finger to nose test showed no dysmetria      DATA:    Lab Results   Component Value Date    GLUCOSE 105 (H) 09/28/2023    CALCIUM 9.8 09/28/2023     09/28/2023    K 3.6 09/28/2023    CO2 24.0  09/28/2023     09/28/2023    BUN 16 09/28/2023    CREATININE 1.16 09/28/2023    EGFRIFNONA 71 12/01/2021    BCR 13.8 09/28/2023    ANIONGAP 14.0 09/28/2023     Lab Results   Component Value Date    WBC 10.20 09/28/2023    HGB 14.6 09/28/2023    HCT 43.2 09/28/2023    MCV 96.4 09/28/2023     09/28/2023       Lab review:   Sodium 140  Creatinine 1.16  Normal LFTs    A1c pending  LDL pending    WBC 10.20  Hemoglobin 14.6 and platelets 359    Imaging review:   CT head no acute hypodensity or hyperdensity aspects 10  CTA head and neck right ICA occlusion with tandem occlusion of right M1  CT perfusion code of 14 mL and penumbra of 140 mL      Pre-stroke MRS: 0  NIHSS: NIH at outside hospital 18    NIH today after procedure    NIHSS:    Baseline  0-->Alert: keenly responsive  0-->Answers both questions correctly  0-->Performs both tasks correctly  0=normal  0=No visual loss  1=Minor paralysis (flattened nasolabial fold, asymmetric on smiling)  0-->No drift: limb holds 90 (or 45) degrees for full 10 secs  0-->No drift: limb holds 90 (or 45) degrees for full 10 secs  0-->No drift: limb holds 90 (or 45) degrees for full 10 secs  0-->No drift: limb holds 90 (or 45) degrees for full 10 secs  0=Absent  0=Normal; no sensory loss  0=No aphasia, normal  1=Mild to moderate, patient slurs at least some words and at worst, can be understood with some difficulty  0=No abnormality    Total score: 2      Right MCA stroke s/p TNK and mechanical thrombectomy with right ICA stent  -Etiology of the stroke most likely large artery athero versus ESUS  -Systolic blood pressure goal less than 140  -He is currently on Integrilin drip due to right ICA stent  -Tomorrow after repeat CT head and placing a top off will start dual antiplatelet therapy with aspirin and Brilinta/aspirin Plavix  -Repeat head CT for any neurological change  -Continue strict bedrest and IV fluids at 75  -MRI brain and TTE pending      MDM   Reviewed: Previous  charts, nursing notes and vitals   Reviewed: Previous labs and CT scan    Interpretation: Labs and CT scan   Total time providing critical care is :30-74 minutes. This excluded time spent performing separately reportable procedures and services  Consults :Neurology/Stroke    Ge Steward MD  Neuro Hospitalist /Vascular Neurology.

## 2023-09-28 NOTE — CONSULTS
Primary Care Provider: Provider, No Known     Consult requested by:  Dr. López     Reason for Consultation: Neurological evaluation, code stroke     History taken from: Chart, RN, EMS    Chief complaint: Found down        SUBJECTIVE:    History of present illness:  Patient brought into [facility] ED after being found down in a closet at work with left sided weakness. Patient was only down own less than 5 minutes before found and was completely normal prior according to his colleagues. Pt brought into ED and code stroke call immediately.     Pt evaluated in ct suite by Dr. Stearns and myself. Patient completely flaccid on the left arm and leg, facial droop and dysarthric speech. Pt deviated to the right.  CT head negative for hemorrhage.     Patient's recent history reviewed which did not show any hx of stroke, seizure or anything that would contraindicate TNK.   TNK ordered and given after obtaining valid weight. BP <180 and glucose >100.     CTA head and neck and perfusion ordered. Pt has obvious decreased perfusion in left MCA territory. Dr. López spoke to [facility] which is recommending immediate transfer for procedure.     Patient reevaluated prior to discharge and was able to move left arm and leg without any drift.     Review of Systems   Unable to perform ROS: Acuity of condition      PATIENT HISTORY:  Past Medical History:   Diagnosis Date    Allergic     Broken neck     Colon tumor     Hyperlipidemia     Hypertension    ,   Past Surgical History:   Procedure Laterality Date    APPENDECTOMY      COLON BIOPSY      NECK SURGERY      broken neck    SKIN GRAFT      TUMOR EXCISION      colon   ,   Family History   Problem Relation Age of Onset    Diabetes Mother     Thyroid disease Mother     Hypertension Mother     Lung cancer Father     Heart disease Father     Hyperlipidemia Brother     Hypertension Brother     Heart disease Brother     Diabetes Brother    ,   Social History     Tobacco Use    Smoking status: Never     Smokeless tobacco: Never   Vaping Use    Vaping Use: Never used   Substance Use Topics    Alcohol use: No    Drug use: No   ,   Prior to Admission medications    Medication Sig Start Date End Date Taking? Authorizing Provider   amLODIPine (NORVASC) 5 MG tablet TAKE 1 TABLET BY MOUTH DAILY 6/5/23   Cherelle Vila APRN   ascorbic acid (VITAMIN C) 100 MG tablet SM VITAMIN C TABS 9/21/16   Melanie Patel MD   Cholecalciferol 1000 units capsule SM VITAMIN D3 CAPS 9/21/16   Melanie Patel MD   fluticasone (Flonase Allergy Relief) 50 MCG/ACT nasal spray 2 sprays into the nostril(s) as directed by provider Daily. 4/29/20   Cherelle Vila APRN   indomethacin (INDOCIN) 50 MG capsule Take 1 capsule by mouth 3 (Three) Times a Day As Needed for Mild Pain . 8/12/22   Cherelle Vila APRN   lovastatin (MEVACOR) 40 MG tablet TAKE 2 TABLETS BY MOUTH EVERY NIGHT 6/5/23   Cherelle Vila APRN   montelukast (SINGULAIR) 10 MG tablet TAKE 1 TABLET BY MOUTH EVERY NIGHT 6/5/23   Cherelle Vila APRN   Multiple Vitamins-Minerals (QC MENS DAILY MULTIVITAMIN) tablet QC MENS DAILY MULTIVITAMIN TABS 9/21/16   Melanie Patel MD   mupirocin (BACTROBAN) 2 % ointment APPLY EXTERNALLY TO THE AFFECTED AREA ON LEGS TWICE DAILY UNTIL HEALED 6/19/23   Melanie Patel MD   Omega-3 Fatty Acids (FISH OIL) 1000 MG capsule capsule Take 1 capsule by mouth Daily With Breakfast.    Melanie Patel MD    Allergies:  Patient has no known allergies.    Current Facility-Administered Medications   Medication Dose Route Frequency Provider Last Rate Last Admin    sodium chloride 0.9 % flush 10 mL  10 mL Intravenous PRN Ryder López MD   10 mL at 09/28/23 3499     Current Outpatient Medications   Medication Sig Dispense Refill    amLODIPine (NORVASC) 5 MG tablet TAKE 1 TABLET BY MOUTH DAILY 90 tablet 1    ascorbic acid (VITAMIN C) 100 MG tablet SM VITAMIN C TABS      Cholecalciferol 1000 units capsule SM VITAMIN D3 CAPS       fluticasone (Flonase Allergy Relief) 50 MCG/ACT nasal spray 2 sprays into the nostril(s) as directed by provider Daily. 1 bottle 3    indomethacin (INDOCIN) 50 MG capsule Take 1 capsule by mouth 3 (Three) Times a Day As Needed for Mild Pain . 15 capsule 0    lovastatin (MEVACOR) 40 MG tablet TAKE 2 TABLETS BY MOUTH EVERY NIGHT 180 tablet 1    montelukast (SINGULAIR) 10 MG tablet TAKE 1 TABLET BY MOUTH EVERY NIGHT 90 tablet 1    Multiple Vitamins-Minerals (QC MENS DAILY MULTIVITAMIN) tablet QC MENS DAILY MULTIVITAMIN TABS      mupirocin (BACTROBAN) 2 % ointment APPLY EXTERNALLY TO THE AFFECTED AREA ON LEGS TWICE DAILY UNTIL HEALED      Omega-3 Fatty Acids (FISH OIL) 1000 MG capsule capsule Take 1 capsule by mouth Daily With Breakfast.          ________________________________________________________        OBJECTIVE:    PHYSICAL EXAM:    Constitutional: Awake, alert   PSYCHIATRIC: Calm  HEENT: Normocephalic, atraumatic.   Chest: Breathing unlabored  Cardiac: Regular rate and rhythm.   Extremities:  No clubbing, cyanosis or edema.      NIHSS:    Level Of Consciousness 0  0=Alert; keenly responsive 1=Not alert, but arousable by minor stimulation 2=Not alert, requires repeated stimulation 3=Responds only with reflex movements    LOC Questions to Month and age 0  0=Answers both questions correctly 1=Answers one question correctly 2=Answers neither question correctly    LOC Commands      -Open/Close eyes 0    -Open/close  0   0=Performs both tasks correctly 1=Performs one task correctly 2=Performs neighter task correctly     Best Gaze 2  0=Normal 1=Partial gaze palsy 2=Forced deviation, or total gaze paresis    Visual 0  0=No visual loss 1=Partial hemianopia 2=Complete hemianopia 3=Bilateral hemianopia (blind including cortical blindness)    Facial Palsy: 2    0=Normal symmetrical movement 1=Minor paralysis (asymmetry) 2=Partial paralysis (lower facde) 3=Complete paralysis (upper and lower face)    Motor: Left Arm-4   Left leg-4; Right Arm-0 Right Leg-0  0=No drift, limb holds posture for full 10 seconds 1=Drift, limb holds posture, no drift to bed 2=Some antigravity effort, cannot maintain posture, drifts to bed 3=No effort against gravity, limb falls 4=No movement    Limb Ataxia: 0  0=Absent 1=Present in one limb 2=Present in two limbs    Sensory 2  0=Normal 1=Mild to moderate sensory loss 2=Severe to total sensory loss    Best Language 0  0=No aphasia, normal 1=Mild to moderate aphasia 2=Severe Aphasia (very few words correct or understood)3=Multe, global aphasia    Dysarthria 2  0=Normal 1=Mild to moderate 2=Severe, unintelligible or mute/anarthric    Extinction/Neglect 2  0=No abnormality 1=Extinction to bilateral simultaneous stimulation 2=Profound neglect    Total: 18    ____________________________________________________  RESULTS REVIEW:    VITAL SIGNS:   Temp:  [97.6 °F (36.4 °C)] 97.6 °F (36.4 °C)  Heart Rate:  [] 86  Resp:  [18-20] 20  BP: (128-157)/() 128/75     LABS:      Lab 09/28/23  1403   WBC 10.20   HEMOGLOBIN 14.6   HEMATOCRIT 43.2   PLATELETS 359   NEUTROS ABS 4.00   LYMPHS ABS 4.70*   MONOS ABS 1.20*   EOS ABS 0.30   MCV 96.4   PROTIME 10.5   APTT 20.2*         Lab 09/28/23  1403   SODIUM 140   POTASSIUM 3.6   CHLORIDE 102   CO2 24.0   ANION GAP 14.0   BUN 16   CREATININE 1.16   EGFR 69.5   GLUCOSE 105*   CALCIUM 9.8         Lab 09/28/23  1403   TOTAL PROTEIN 7.3   ALBUMIN 4.4   GLOBULIN 2.9   ALT (SGPT) 30   AST (SGOT) 24   BILIRUBIN 0.3   ALK PHOS 72         Lab 09/28/23  1403   HSTROP T 10   PROTIME 10.5   INR 0.96                     Lab Results   Component Value Date     (H) 07/05/2023       IMAGING STUDIES:  CT Angiogram Neck    Result Date: 9/28/2023  Impression: Complete occlusion of the right ICA from its origin through the distal cavernous intracranial segment, with likely retrograde reconstitution. There is tandem subsequent complete occlusion of the right MCA M1 segment, with  reconstitution of the anterior temporal branch and overall decreased arborization of M2 and M3 vessels through the right MCA territory, corresponding with ischemic tissue at risk on concurrently performed CT perfusion. Findings relayed to Dr. López of the emergency department by Ian Evans via the epic messaging system at 2:43 p.m. 9/28/2023. Electronically Signed: Alexi Evans MD  9/28/2023 2:43 PM EDT  Workstation ID: [id number]    CT Head Without Contrast Stroke Protocol    Result Date: 9/28/2023  Impression: 1. No acute intracranial finding. 2. Features of mild chronic microvascular disease. Electronically Signed: Melody Becerra MD  9/28/2023 2:15 PM EDT  Workstation ID: [id number]    CT Angiogram Head w AI Analysis of LVO    Result Date: 9/28/2023  Impression: Complete occlusion of the right ICA from its origin through the distal cavernous intracranial segment, with likely retrograde reconstitution. There is tandem subsequent complete occlusion of the right MCA M1 segment, with reconstitution of the anterior temporal branch and overall decreased arborization of M2 and M3 vessels through the right MCA territory, corresponding with ischemic tissue at risk on concurrently performed CT perfusion. Findings relayed to Dr. López of the emergency department by Ian Evans via the epic messaging system at 2:43 p.m. 9/28/2023. Electronically Signed: Alexi Evans MD  9/28/2023 2:43 PM EDT  Workstation ID: [id number]    CT CEREBRAL PERFUSION WITH & WITHOUT CONTRAST    Result Date: 9/28/2023  Impression: Findings consistent with large right MCA hypoperfusion and evolving infarct. The ischemic core is approximately 12 cc volume. Large ischemic penumbra is suggested. I have already discussed the major pertinent findings with neurology team prior to the time of this dictation. Electronically Signed: Melody Becerra MD  9/28/2023 2:38 PM EDT  Workstation ID: [id number]     I reviewed the patient's new clinical  results.    ________________________________________________________     PROBLEM LIST:    * No active hospital problems. *            ASSESSMENT/PLAN:  Acute onset of left hemiparesis, gaze deviation and dysarthria s/p TNK  - Keep systolic blood pressure less than 180/105, Cardene drip as needed, avoid hypotension, vital signs per protocol.  - NPO until bedside swallow test completed and passed and/or cleared by speech therapy.   - Closely monitor neurological status, NIH every shift and with changes in neurological status    - Plan for transfer to [facility] due to right ICA and MCA occlusion   - Discussed with DR. López.     I discussed the patient's findings and my recommendations with patient, family, nursing staff, and consulting provider    Zulma Prince, SHABBIR  09/28/23  14:57 EDT

## 2023-09-28 NOTE — ANESTHESIA PROCEDURE NOTES
Airway  Urgency: elective    Date/Time: 9/28/2023 3:26 PM  End Time:9/28/2023 3:33 PM  Airway not difficult    General Information and Staff    Patient location during procedure: OR  Anesthesiologist: Blas Al MD    Indications and Patient Condition  Indications for airway management: airway protection    Preoxygenated: yes  MILS maintained throughout  Mask difficulty assessment: 1 - vent by mask    Final Airway Details  Final airway type: endotracheal airway      Successful airway: ETT  Cuffed: yes   Successful intubation technique: direct laryngoscopy  Facilitating devices/methods: cricoid pressure  Endotracheal tube insertion site: oral  Blade: CMAC  Blade size: 3  ETT size (mm): 8.0  Cormack-Lehane Classification: grade IIa - partial view of glottis  Placement verified by: chest auscultation and capnometry   Inital cuff pressure (cm H2O): 5  Cuff volume (mL): 5  Measured from: gums  ETT/EBT to gums (cm): 22  Number of attempts at approach: 3 or more  Assessment: lips, teeth, and gum same as pre-op    Additional Comments  C mac used to intubated with eschmann after extremly difficult attempts made with MAC 4,

## 2023-09-28 NOTE — NURSING NOTE
Pre-procedure NIH:20  Decision time:1424  In room:1515  Access puncture time (start time):1526  Guide catheter placement:1535  First pass:  Subsequent passes:  Time of recanalization:1600  TICI:3  End procedure:1621

## 2023-09-28 NOTE — ANESTHESIA POSTPROCEDURE EVALUATION
Patient: Abel Tafoya    Procedure Summary       Date: 09/28/23 Room / Location: [facility] INTERVENTIONAL    Anesthesia Start: 1514 Anesthesia Stop: 1655    Procedure: IR PERC MECH THROMB PRIM NONC ART INI Diagnosis: (stroke)    Scheduled Providers:  Provider: Blas Al MD    Anesthesia Type: general ASA Status: 4 - Emergent            Anesthesia Type: general    Vitals  No vitals data found for the desired time range.          Post Anesthesia Care and Evaluation    Patient location during evaluation: bedside  Patient participation: complete - patient participated  Level of consciousness: awake and alert  Pain management: adequate    Airway patency: patent  Anesthetic complications: No anesthetic complications    Cardiovascular status: acceptable  Respiratory status: acceptable  Hydration status: acceptable    Comments: There were no vitals taken for this visit.

## 2023-09-28 NOTE — NURSING NOTE
1427 - Go Activation per Dr. Flannery    14:36 - EMS in route to [place].     14:43 - EMS arrival     1449 - EMS departure. Patient NIH 0

## 2023-09-28 NOTE — NURSING NOTE
Assumed care at 1650.    Initial NIHSS score is a 2 upon arrival to unit. Pt failing dysphagia screening on unit 2/2 facial asymmetry.    Unable to load with oral brilinta and aspirin 2/2 failed dyphasgia screen.    SBP elevated greater than 140 PRN labetalol given. Will continue to monitor.

## 2023-09-29 ENCOUNTER — APPOINTMENT (OUTPATIENT)
Dept: CARDIOLOGY | Facility: HOSPITAL | Age: 68
DRG: 035 | End: 2023-09-29
Payer: MEDICARE

## 2023-09-29 ENCOUNTER — APPOINTMENT (OUTPATIENT)
Dept: CT IMAGING | Facility: HOSPITAL | Age: 68
DRG: 035 | End: 2023-09-29
Payer: MEDICARE

## 2023-09-29 LAB
ANION GAP SERPL CALCULATED.3IONS-SCNC: 11.7 MMOL/L (ref 5–15)
BUN SERPL-MCNC: 14 MG/DL (ref 8–23)
BUN/CREAT SERPL: 15.1 (ref 7–25)
CALCIUM SPEC-SCNC: 8.5 MG/DL (ref 8.6–10.5)
CHLORIDE SERPL-SCNC: 107 MMOL/L (ref 98–107)
CHOLEST SERPL-MCNC: 183 MG/DL (ref 0–200)
CO2 SERPL-SCNC: 19.3 MMOL/L (ref 22–29)
CREAT SERPL-MCNC: 0.93 MG/DL (ref 0.76–1.27)
DEPRECATED RDW RBC AUTO: 49 FL (ref 37–54)
EGFRCR SERPLBLD CKD-EPI 2021: 90.6 ML/MIN/1.73
ERYTHROCYTE [DISTWIDTH] IN BLOOD BY AUTOMATED COUNT: 13.7 % (ref 12.3–15.4)
GLUCOSE BLDC GLUCOMTR-MCNC: 117 MG/DL (ref 70–130)
GLUCOSE BLDC GLUCOMTR-MCNC: 118 MG/DL (ref 70–130)
GLUCOSE BLDC GLUCOMTR-MCNC: 134 MG/DL (ref 70–130)
GLUCOSE BLDC GLUCOMTR-MCNC: 144 MG/DL (ref 70–130)
GLUCOSE SERPL-MCNC: 137 MG/DL (ref 65–99)
HCT VFR BLD AUTO: 39.6 % (ref 37.5–51)
HDLC SERPL-MCNC: 46 MG/DL (ref 40–60)
HGB BLD-MCNC: 13.4 G/DL (ref 13–17.7)
LDLC SERPL CALC-MCNC: 125 MG/DL (ref 0–100)
LDLC/HDLC SERPL: 2.7 {RATIO}
MCH RBC QN AUTO: 32.8 PG (ref 26.6–33)
MCHC RBC AUTO-ENTMCNC: 33.8 G/DL (ref 31.5–35.7)
MCV RBC AUTO: 96.8 FL (ref 79–97)
PLATELET # BLD AUTO: 312 10*3/MM3 (ref 140–450)
PMV BLD AUTO: 9.5 FL (ref 6–12)
POTASSIUM SERPL-SCNC: 4.8 MMOL/L (ref 3.5–5.2)
RBC # BLD AUTO: 4.09 10*6/MM3 (ref 4.14–5.8)
SODIUM SERPL-SCNC: 138 MMOL/L (ref 136–145)
TRIGL SERPL-MCNC: 63 MG/DL (ref 0–150)
VLDLC SERPL-MCNC: 12 MG/DL (ref 5–40)
WBC NRBC COR # BLD: 11.1 10*3/MM3 (ref 3.4–10.8)

## 2023-09-29 PROCEDURE — 99232 SBSQ HOSP IP/OBS MODERATE 35: CPT | Performed by: STUDENT IN AN ORGANIZED HEALTH CARE EDUCATION/TRAINING PROGRAM

## 2023-09-29 PROCEDURE — 85027 COMPLETE CBC AUTOMATED: CPT | Performed by: INTERNAL MEDICINE

## 2023-09-29 PROCEDURE — 82948 REAGENT STRIP/BLOOD GLUCOSE: CPT

## 2023-09-29 PROCEDURE — 25010000002 EPTIFIBATIDE PER 5 MG: Performed by: RADIOLOGY

## 2023-09-29 PROCEDURE — 92610 EVALUATE SWALLOWING FUNCTION: CPT

## 2023-09-29 PROCEDURE — 80061 LIPID PANEL: CPT | Performed by: STUDENT IN AN ORGANIZED HEALTH CARE EDUCATION/TRAINING PROGRAM

## 2023-09-29 PROCEDURE — 70450 CT HEAD/BRAIN W/O DYE: CPT

## 2023-09-29 PROCEDURE — 99024 POSTOP FOLLOW-UP VISIT: CPT

## 2023-09-29 PROCEDURE — 80048 BASIC METABOLIC PNL TOTAL CA: CPT | Performed by: INTERNAL MEDICINE

## 2023-09-29 RX ORDER — EPTIFIBATIDE 0.75 MG/ML
1 INJECTION, SOLUTION INTRAVENOUS CONTINUOUS
Status: DISCONTINUED | OUTPATIENT
Start: 2023-09-29 | End: 2023-09-29

## 2023-09-29 RX ORDER — ASPIRIN 81 MG/1
81 TABLET, CHEWABLE ORAL DAILY
Status: DISCONTINUED | OUTPATIENT
Start: 2023-09-29 | End: 2023-10-01 | Stop reason: HOSPADM

## 2023-09-29 RX ADMIN — AMLODIPINE BESYLATE 5 MG: 5 TABLET ORAL at 09:16

## 2023-09-29 RX ADMIN — ATORVASTATIN CALCIUM 80 MG: 80 TABLET, FILM COATED ORAL at 09:16

## 2023-09-29 RX ADMIN — TICAGRELOR 90 MG: 90 TABLET ORAL at 21:12

## 2023-09-29 RX ADMIN — SENNOSIDES AND DOCUSATE SODIUM 2 TABLET: 50; 8.6 TABLET ORAL at 21:12

## 2023-09-29 RX ADMIN — Medication 10 ML: at 09:26

## 2023-09-29 RX ADMIN — SENNOSIDES AND DOCUSATE SODIUM 2 TABLET: 50; 8.6 TABLET ORAL at 09:16

## 2023-09-29 RX ADMIN — SODIUM CHLORIDE 100 ML/HR: 9 INJECTION, SOLUTION INTRAVENOUS at 06:05

## 2023-09-29 RX ADMIN — ASPIRIN 81 MG: 81 TABLET, CHEWABLE ORAL at 12:17

## 2023-09-29 RX ADMIN — Medication 10 ML: at 21:19

## 2023-09-29 RX ADMIN — TICAGRELOR 90 MG: 90 TABLET ORAL at 12:17

## 2023-09-29 RX ADMIN — FLUTICASONE PROPIONATE 2 SPRAY: 50 SPRAY, METERED NASAL at 09:16

## 2023-09-29 RX ADMIN — EPTIFIBATIDE 1 MCG/KG/MIN: 0.75 INJECTION INTRAVENOUS at 05:37

## 2023-09-29 RX ADMIN — MONTELUKAST SODIUM 10 MG: 10 TABLET, FILM COATED ORAL at 21:12

## 2023-09-29 NOTE — PROGRESS NOTES
"DOS: 2023  NAME: Abel Tafoya   : 1955  PCP: Cherelle Vila APRN  No chief complaint on file.      Chief complaint: Left-sided weakness    Subjective: Patient has been seen and evaluated, no acute events overnight.  NIH probably 1 no clear deficits observed on today's examination, probably some's mild dysarthria.      Objective:  Vital signs: /77   Pulse 63   Temp 98 °F (36.7 °C) (Oral)   Resp 18   Ht 167.6 cm (66\")   Wt 95.2 kg (209 lb 14.1 oz)   SpO2 95%   BMI 33.88 kg/m²         Higher integrative function: Oriented to time, place, person, normal comprehension repetition and fluency mild dysarthria  CN II: Normal visual acuity   CN III IV VI: Extraocular movements are full without nystagmus. Pupils are equal, round, and reactive to light.   CN V: Sensation same on both sides of the face  CN VII: Mild left facial droop  CN VIII: Auditory acuity is normal.   CN IX & X: Symmetric palatal movement.   CN XI: Sternocleidomastoid and trapezius are normal. No weakness.   CN XII: The tongue is midline. No atrophy or fasciculations.   Motor: Right upper and right lower extremity 5/5 and left upper and left lower extremity 4+/5  Sensation: Normal sensation to pin prick and light touch all four extremities   Coordination: Finger to nose test showed no dysmetria      Laboratory results:  Lab Results   Component Value Date    GLUCOSE 137 (H) 2023    CALCIUM 8.5 (L) 2023     2023    K 4.8 2023    CO2 19.3 (L) 2023     2023    BUN 14 2023    CREATININE 0.93 2023    EGFRIFNONA 71 2021    BCR 15.1 2023    ANIONGAP 11.7 2023     Lab Results   Component Value Date    WBC 11.10 (H) 2023    HGB 13.4 2023    HCT 39.6 2023    MCV 96.8 2023     2023     Lab Results   Component Value Date     (H) 2023     (H) 2022     (H) 2021            Review of labs: "   Sodium 138  Creatinine 0.93  Blood glucose 137    WBC 11.10  Hemoglobin 13.4 and platelets 312    A1c and LDL pending    Review and interpretation of imaging:   CT head no acute hypodensity or hyperdensity aspects 10  CTA head and neck right ICA occlusion with tandem occlusion of right M1  CT perfusion code of 14 mL and penumbra of 140 mL    Repeat CT head 9/29/2023 pending        Pre-stroke MRS: 0  NIHSS: NIH at outside hospital 18    Diagnoses:  Right MCA stroke s/p TNK and mechanical thrombectomy with right ICA stent     Right MCA stroke s/p TNK and mechanical thrombectomy with right ICA stent  -Etiology of the stroke most likely large artery athero versus ESUS  -Systolic blood pressure goal less than 140  -Patient was on Integrilin drip s/p stenting yesterday, currently waiting for repeat CT head to make sure no hemorrhage to start him on dual antiplatelet therapy.  -Needs to be on dual antiplatelet therapy aspirin 81 and Plavix 75mg daily  for at least 3 to 6 months until follow-up with neurosurgery outpatient  -MRI and TTE pending  -Past swallow eval by SLP  -PT eval's pending  -Can be transferred out of the ICU in the evening after CT head is stable    2.  Hypertension  -Blood pressure goal less than 140  -At home on amlodipine 5 mg daily, restart home amlodipine    3.  Hyperlipidemia  -Patient's LDL goal is less than 75, patient's LDL is pending  -At home on low-dose lovastatin 40 mg, can start him on Lipitor 80 mg daily      MDM   Reviewed: Previous charts, nursing notes and vitals   Reviewed: Previous labs and CT scan    Interpretation: Labs and CT scan   Total time providing critical care is :30-74 minutes. This excluded time spent performing separately reportable procedures and services  Consults :Neurology/Stroke    Ge Steward MD  Neuro Hospitalist /Vascular Neurology.

## 2023-09-29 NOTE — THERAPY EVALUATION
Acute Care - Speech Language Pathology   Swallow Initial Evaluation [facility]     Patient Name: Abel Tafoya  : 1955  MRN: [id number]  Today's Date: 2023               Admit Date: 2023    Visit Dx:   No diagnosis found.  Patient Active Problem List   Diagnosis    Encounter for screening    Welcome to Medicare preventive visit    History of colonic polyps    Hyperlipidemia    Prostate nodule    Flu vaccine need    Weakness    Vitamin D deficiency    History of elevated PSA    Secondary hypertension    Need for hepatitis C screening test    Class 1 obesity with serious comorbidity and body mass index (BMI) of 30.0 to 30.9 in adult    Psoriasis    Right sided cerebral hemisphere cerebrovascular accident (CVA)     Past Medical History:   Diagnosis Date    Allergic     Broken neck     Colon tumor     Hyperlipidemia     Hypertension      Past Surgical History:   Procedure Laterality Date    APPENDECTOMY      COLON BIOPSY      NECK SURGERY      broken neck    SKIN GRAFT      TUMOR EXCISION      colon       SLP Recommendation and Plan  SLP Swallowing Diagnosis: swallow WFL/no suspected pharyngeal impairment (23 1000)  SLP Diet Recommendation: regular textures, thin liquids (23 1000)  Recommended Precautions and Strategies: upright posture during/after eating (23 1000)  SLP Rec. for Method of Medication Administration: meds whole, as tolerated (23 1000)                    Therapy Frequency (Swallow): PRN (23 1000)  Predicted Duration Therapy Intervention (Days): until discharge (23 1000)  Oral Care Recommendations: Oral Care BID/PRN (23 1000)                                      Oral Care Recommendations: Oral Care BID/PRN (23 1000)    Plan of Care Reviewed With: patient  Outcome Evaluation: Patient seen for clinical swallow assessment. Oral mech exam unremarkable. No overt s/s of aspiration with ice, thins via cup/straw, puree, mixed mech soft, or  regular solids. SLP recs regular and thins. Meds as dolly. Will follow for further assessment pending MRI results.      SWALLOW EVALUATION (last 72 hours)       SLP Adult Swallow Evaluation       Row Name 09/29/23 1000                   Rehab Evaluation    Document Type evaluation  -        Patient Effort excellent  -           General Information    Patient Profile Reviewed yes  -        Pertinent History Of Current Problem R MCA s/p TNK  -        Current Method of Nutrition NPO  -        Precautions/Limitations, Vision WFL;for purposes of eval  -        Precautions/Limitations, Hearing WFL;for purposes of eval  -        Prior Level of Function-Communication WFL  -        Prior Level of Function-Swallowing no diet consistency restrictions  -        Plans/Goals Discussed with patient;family;agreed upon  -        Barriers to Rehab medically complex  -           Pain    Additional Documentation Pain Scale: FACES Pre/Post-Treatment (Group)  -           Pain Scale: FACES Pre/Post-Treatment    Pain: FACES Scale, Pretreatment 0-->no hurt  -           Oral Motor Structure and Function    Dentition Assessment natural, present and adequate  -           Oral Musculature and Cranial Nerve Assessment    Oral Motor General Assessment WFL  -           Clinical Swallow Eval    Clinical Swallow Evaluation Summary Patient seen for clinical swallow assessment. Oral mech exam unremarkable. No overt s/s of aspiration with ice, thins via cup/straw, puree, mixed mech soft, or regular solids. SLP recs regular and thins. Meds as dolly. Will follow for further assessment pending MRI results.  -           SLP Evaluation Clinical Impression    SLP Swallowing Diagnosis swallow WFL/no suspected pharyngeal impairment  -           Recommendations    Therapy Frequency (Swallow) PRN  -        Predicted Duration Therapy Intervention (Days) until discharge  -        SLP Diet Recommendation regular textures;thin  liquids  -        Recommended Precautions and Strategies upright posture during/after eating  -        Oral Care Recommendations Oral Care BID/PRN  -        SLP Rec. for Method of Medication Administration meds whole;as tolerated  -                  User Key  (r) = Recorded By, (t) = Taken By, (c) = Cosigned By      Initials Name Effective Dates     [place] Ferrera MS CCC-SLP 06/16/21 -                     EDUCATION  The patient has been educated in the following areas:   Dysphagia (Swallowing Impairment).              Time Calculation:    Time Calculation- SLP       Row Name 09/29/23 1015             Time Calculation- [facility]    SLP Start Time 0800  -      SLP Received On 09/29/23  -         Untimed Charges    [id number] Eval Oral Pharyng Swallow Minutes 60  -         Total Minutes    Untimed Charges Total Minutes 60  -       Total Minutes 60  -                User Key  (r) = Recorded By, (t) = Taken By, (c) = Cosigned By      Initials Name Provider Type     Florida Ferrera MS CCC-SLP Speech and Language Pathologist                    Therapy Charges for Today       Code Description Service Date Service Provider Modifiers Qty    [id number] HC ST EVAL ORAL PHARYNG SWALLOW 4 9/29/2023 Florida Ferrera MS CCC-SLP GN 1                 Florida Ferrera MS CCC-LUIS  9/29/2023

## 2023-09-29 NOTE — PLAN OF CARE
Goal Outcome Evaluation:  Plan of Care Reviewed With: patient           Outcome Evaluation: Patient seen for clinical swallow assessment. Oral mech exam unremarkable. No overt s/s of aspiration with ice, thins via cup/straw, puree, mixed mech soft, or regular solids. SLP recs regular and thins. Meds as dolly. Will follow for further assessment pending MRI results.

## 2023-09-29 NOTE — PROGRESS NOTES
[facility] Acute Rehab    Stroke screening per stroke order set via Epic. Please note that this is a screening. If you feel that this pt is or will be appropriate for Acute Rehab, please call the Admission Office at [phone] and a full evaluation will be initiated.     Thank You  Gely Chau RN  Acute Rehab Admission Nurse

## 2023-09-29 NOTE — PROGRESS NOTES
Patient Care Team:  Cherelle Vila APRN as PCP - General (Nurse Practitioner)      Subjective     Patient is a 68 y.o. male.  Whom we are following post CVA who underwent TNKase and stenting of a right internal carotid artery stenosis.  He notes no symptoms today he feels back to      Review of Systems:        History  Past Medical History:   Diagnosis Date    Allergic     Broken neck     Colon tumor     Hyperlipidemia     Hypertension      Past Surgical History:   Procedure Laterality Date    APPENDECTOMY      COLON BIOPSY      NECK SURGERY      broken neck    SKIN GRAFT      TUMOR EXCISION      colon     Social History     Socioeconomic History    Marital status:    Tobacco Use    Smoking status: Never    Smokeless tobacco: Never   Vaping Use    Vaping Use: Never used   Substance and Sexual Activity    Alcohol use: No    Drug use: No    Sexual activity: Defer     Family History   Problem Relation Age of Onset    Diabetes Mother     Thyroid disease Mother     Hypertension Mother     Lung cancer Father     Heart disease Father     Hyperlipidemia Brother     Hypertension Brother     Heart disease Brother     Diabetes Brother          Allergies:  Patient has no known allergies.    Medications:  Prior to Admission medications    Medication Sig Start Date End Date Taking? Authorizing Provider   ascorbic acid (VITAMIN C) 100 MG tablet  VITAMIN C TABS 9/21/16  Yes ProviderMelanie MD   Cholecalciferol 1000 units capsule  VITAMIN D3 CAPS 9/21/16  Yes Provider, MD Melanie   Omega-3 Fatty Acids (FISH OIL) 1000 MG capsule capsule Take 1 capsule by mouth Daily With Breakfast.   Yes ProviderMelanie MD   amLODIPine (NORVASC) 5 MG tablet TAKE 1 TABLET BY MOUTH DAILY 6/5/23   Cherelle Vila APRN   fluticasone (Flonase Allergy Relief) 50 MCG/ACT nasal spray 2 sprays into the nostril(s) as directed by provider Daily. 4/29/20   Cherelle Vila APRN   indomethacin (INDOCIN) 50 MG capsule  "Take 1 capsule by mouth 3 (Three) Times a Day As Needed for Mild Pain . 8/12/22   Cherelle Vila APRN   lovastatin (MEVACOR) 40 MG tablet TAKE 2 TABLETS BY MOUTH EVERY NIGHT 6/5/23   Cherelle Vila APRN   montelukast (SINGULAIR) 10 MG tablet TAKE 1 TABLET BY MOUTH EVERY NIGHT 6/5/23   Cherelle Vila APRN   Multiple Vitamins-Minerals (QC MENS DAILY MULTIVITAMIN) tablet QC MENS DAILY MULTIVITAMIN TABS 9/21/16   ProviderMelanie MD   mupirocin (BACTROBAN) 2 % ointment APPLY EXTERNALLY TO THE AFFECTED AREA ON LEGS TWICE DAILY UNTIL HEALED 6/19/23   ProviderMelanie MD     amLODIPine, 5 mg, Oral, Daily  aspirin, 81 mg, Oral, Daily  atorvastatin, 80 mg, Oral, Daily  fluticasone, 2 spray, Nasal, Daily  montelukast, 10 mg, Oral, Nightly  senna-docusate sodium, 2 tablet, Oral, BID  sodium chloride, 10 mL, Intravenous, Q12H  ticagrelor, 90 mg, Oral, BID      niCARdipine, 5-15 mg/hr  sodium chloride, 100 mL/hr, Last Rate: 100 mL/hr (09/29/23 0605)        Objective     Vital Signs  Vital Sign Min/Max for last 24 hours  Temp  Min: 97.6 °F (36.4 °C)  Max: 98.7 °F (37.1 °C)   BP  Min: 94/57  Max: 154/91   Pulse  Min: 52  Max: 90   Resp  Min: 12  Max: 20   SpO2  Min: 91 %  Max: 98 %   Flow (L/min)  Min: 2  Max: 2   Weight  Min: 95.2 kg (209 lb 14.1 oz)  Max: 95.2 kg (209 lb 14.1 oz)       Intake/Output Summary (Last 24 hours) at 9/29/2023 1447  Last data filed at 9/29/2023 1400  Gross per 24 hour   Intake 1698.53 ml   Output 800 ml   Net 898.53 ml     I/O this shift:  In: 699.5 [I.V.:699.5]  Out: 300 [Urine:300]  Last Weight and Admission Weight        09/28/23  1927   Weight: 95.2 kg (209 lb 14.1 oz)     Flowsheet Rows      Flowsheet Row First Filed Value   Admission Height 167.6 cm (66\") Documented at 09/28/2023 1927   Admission Weight 95.2 kg (209 lb 14.1 oz) Documented at 09/28/2023 1927            Body mass index is 33.88 kg/m².           Physical Exam:  General Appearance: White male resting comfortably in bed " in no apparent distress  Eyes: Conjunctival are clear and anicteric pupils are equal and reactive to light and extraocular muscles are intact  ENT: Mucous membranes are moist no erythema or exudates tongue is midline no facial symmetry, speech is fluent  Neck: No adenopathy or thyromegaly trachea midline  Lungs: Clear nonlabored symmetric expansion  Cardiac: Regular rate rhythm no murmur  Abdomen: Soft nontender no palpable hepatosplenomegaly or masses  : Not examined  Musculoskeletal: Grossly normal  Skin: Warm and dry no jaundice no petechiae  Neuro: He is alert and oriented x3 he is cooperative no pronator drift good  finger-nose to finger without difficulty bilaterally good dorsiflexion plantarflexion straight leg raise.  NIH 0  Extremities/P Vascular: No clubbing no cyanosis no edema palpable radial and dorsalis pedis pulses  MSE: Seems to be in very good spirits      Labs:  Results from last 7 days   Lab Units 09/29/23  0311 09/28/23  1403   GLUCOSE mg/dL 137* 105*   SODIUM mmol/L 138 140   POTASSIUM mmol/L 4.8 3.6   CO2 mmol/L 19.3* 24.0   CHLORIDE mmol/L 107 102   ANION GAP mmol/L 11.7 14.0   CREATININE mg/dL 0.93 1.16   BUN mg/dL 14 16   BUN / CREAT RATIO  15.1 13.8   CALCIUM mg/dL 8.5* 9.8   ALK PHOS U/L  --  72   TOTAL PROTEIN g/dL  --  7.3   ALT (SGPT) U/L  --  30   AST (SGOT) U/L  --  24   BILIRUBIN mg/dL  --  0.3   ALBUMIN g/dL  --  4.4   GLOBULIN gm/dL  --  2.9     Estimated Creatinine Clearance: 84.4 mL/min (by C-G formula based on SCr of 0.93 mg/dL).      Results from last 7 days   Lab Units 09/29/23  0311 09/28/23  1403   WBC 10*3/mm3 11.10* 10.20   RBC 10*6/mm3 4.09* 4.48   HEMOGLOBIN g/dL 13.4 14.6   HEMATOCRIT % 39.6 43.2   MCV fL 96.8 96.4   MCH pg 32.8 32.7   MCHC g/dL 33.8 33.9   RDW % 13.7 13.8   RDW-SD fl 49.0 45.9   MPV fL 9.5 7.7   PLATELETS 10*3/mm3 312 359   NEUTROPHIL % %  --  38.9*   LYMPHOCYTE % %  --  45.7*   MONOCYTES % %  --  11.9   EOSINOPHIL % %  --  2.9   BASOPHIL % %  --   0.6   NEUTROS ABS 10*3/mm3  --  4.00   LYMPHS ABS 10*3/mm3  --  4.70*   MONOS ABS 10*3/mm3  --  1.20*   EOS ABS 10*3/mm3  --  0.30   BASOS ABS 10*3/mm3  --  0.10   NRBC /100 WBC  --  0.1         Results from last 7 days   Lab Units 09/28/23  1403   HSTROP T ng/L 10                 Results from last 7 days   Lab Units 09/28/23  1403   INR  0.96     Microbiology Results (last 10 days)       ** No results found for the last 240 hours. **              Diagnostics:  CT Head Without Contrast    Result Date: 9/29/2023  CT HEAD WITHOUT CONTRAST  CLINICAL HISTORY: Follow-up stroke.  TECHNIQUE: CT scan of the head was obtained with 3 mm axial soft tissue algorithm images. No intravenous contrast was administered. Sagittal and coronal reconstructions were obtained.  COMPARISON: CT scan of the head dated 09/28/2023.  FINDINGS:   In the interval since a prior outside CT scan of the head dated 09/28/2023, the patient has developed an area of abnormal hypodensity within the lateral aspect of the right frontal lobe measuring up to 15 x 12 mm in greatest axial dimensions that is compatible with an area of acute completed infarction within the right MCA distribution. There is no evidence for hemorrhagic transformation. No significant mass effect is seen as a result of this infarct.  Otherwise, the ventricles, sulci, and cisterns are age-appropriate. The basal ganglia and thalami are unremarkable in appearance. The posterior fossa structures are within normal limits.  Incidental note is made of a small mucous retention cyst within the left maxillary sinus. Postsurgical changes are incidentally appreciated at the level of the posterior arch of C1.       In the interval since the prior examination, the patient has developed a small area of acute completed infarction within the lateral aspect of the right frontal lobe within the right MCA distribution. This measures up to 15 x 12 mm in greatest axial dimensions and there is no evidence  for hemorrhagic transformation at this site. There is no significant degree of mass effect as a result of this infarct.   Radiation dose reduction techniques were utilized, including automated exposure control and exposure modulation based on body size.  This report was finalized on 9/29/2023 12:08 PM by Dr. Corona Calvillo M.D.      CT Angiogram Neck    Result Date: 9/28/2023  CT ANGIOGRAM HEAD W AI ANALYSIS OF LVO, CT ANGIOGRAM NECK Date of Exam: 9/28/2023 2:23 PM EDT Indication: Stroke, follow up Neuro deficit, acute stroke suspected Acute Stroke. Comparison: None available. Technique: CTA of the head and neck was performed after the uneventful intravenous administration of iodinated contrast. Reconstructed coronal and sagittal images were also obtained. In addition, a 3-D volume rendered image was created for interpretation. Automated exposure control and iterative reconstruction methods were used. Findings: The lung apices are clear. Evaluation of the neck soft tissues demonstrates no pathologic cervical adenopathy or unexpected aerodigestive tract mass. The osseous structures demonstrate no evidence of acute fracture or aggressive osseous lesion with spondylosis change and prior cerclage wire fusion of C1-C3 noted posteriorly. Patent aortic arch with three-vessel branching. The visualized subclavian arteries are patent bilaterally. The right ICA is completely occluded through the intracranial segment. The left ICA demonstrates mild calcific atherosclerosis, with less than 50% narrowing by NASCET criteria. The vertebral arteries are normal in course and caliber bilaterally. Intracranially, there is occlusion of the right ICA, with some faint reconstitution of the distal cavernous segment, likely via patent anterior communicating and posterior communicating arteries. The right middle cerebral artery demonstrates some focal mild to moderate narrowing proximally, with subsequent complete occlusion. The anterior  temporal M2 branch reconstitutes and there is some likely collateral filling of additional faint sylvian M2 branches. The left middle cerebral artery demonstrates some mild to moderate narrowing of the proximal anterior temporal M2 branch otherwise without evidence of large vessel occlusion or aneurysm. The vertebrobasilar system is patent. The posterior cerebral arteries demonstrate moderate narrowing of the right P1 segment, otherwise patent.     Impression: Complete occlusion of the right ICA from its origin through the distal cavernous intracranial segment, with likely retrograde reconstitution. There is tandem subsequent complete occlusion of the right MCA M1 segment, with reconstitution of the anterior temporal branch and overall decreased arborization of M2 and M3 vessels through the right MCA territory, corresponding with ischemic tissue at risk on concurrently performed CT perfusion. Findings relayed to Dr. López of the emergency department by Ian Evans via the epic messaging system at 2:43 p.m. 9/28/2023. Electronically Signed: Alexi Evans MD  9/28/2023 2:43 PM EDT  Workstation ID: [id number]    XR Chest 1 View    Result Date: 9/28/2023  XR CHEST 1 VW Date of Exam: 9/28/2023 2:40 PM EDT Indication: Acute Stroke Protocol (onset < 12 hrs) Comparison: PA and lateral chest 5/26/2021 Findings: No acute airspace disease. Heart size is within normal limits. No pleural effusion or pneumothorax or acute osseous abnormality. Surgical changes are seen over the upper neck. Degenerative endplate spurring is present in the spine.     Impression: No acute cardiopulmonary findings. Electronically Signed: Melody Becerra MD  9/28/2023 2:59 PM EDT  Workstation ID: [id number]    IR Perc Mech Thromb Prim Nonc Art Ini    Result Date: 9/29/2023  CEREBRAL ARTERIOGRAM WITH RIGHT ICA ORIGIN STENT PLACEMENT UNDER DISTAL PROTECTION performed on 9/28/2023.  CLINICAL HISTORY: 68-year-old male with history of hypertension and  hyperlipidemia now having acute onset of left hemiparesis, dysarthric speech plus left facial droop and found down this morning. Stroke imaging work-up demonstrated a distal ICA occlusion extending into the M1 segment with a significant penumbra on CTP. The patient is status post IV TNK and now transferred for endovascular treatment.  TECHNIQUE: After obtaining emergency consent, the patient was placed in a supine position on the angiographic table and the right groin was then prepped and draped in usual sterile fashion with ChloraPrep soap. Under ultrasound guidance with permanent images recorded, a 4 Singaporean micro-puncture set was used to access the right common femoral artery through a dermatotomy. Through this access an 8 Singaporean sheath was positioned within the right groin. Over an 035 Glidewire, a 6 Singaporean vertebral artery catheter was placed coaxially into a [employer] 88 support guiding sheath and together these were placed into the circulation and the following vessels were then selectively catheterized:  1. Right common carotid artery with digital subtraction imaging of the cervical and intracranial runoff after contrast injection.  Upon completion of the diagnostic portion of the exam, the endovascular procedure was performed as described below. At the conclusion of the entire procedure, the catheters and sheath were removed with placement of an 8 Singaporean Angio-Seal. Once hemostasis was achieved the appropriate dressing was applied. No immediate postprocedural complications were encountered. General endotracheal anesthesia was provided by the anesthesia department. Approximately 27.8 minutes of fluoroscopy time were employed delivering an AK of 1441.02 mGy. 155 cc of Visipaque 320 were injected. Maximal sterile barrier technique was employed throughout the entire procedure according to current guidelines.  Surgeon: Dr. Grady Flannery.  FINDINGS: The right common carotid artery injection demonstrates a patent  bifurcation occurring at the C3-4 level, but a high-grade stenosis by NASCET criteria is present measuring 87% with slow runoff seen. The intracranial vasculature shows a patent M1 segment with no appreciable A1 segment. The MCA runoff is patent with no distal occlusion identified. No aneurysms, hypervascular lesions or evidence of AV shunting is appreciated.  ENDOVASCULAR PROCEDURE:  Right ICA Origin Carotid Stent Placement under Distal Protection - Upon completion of the diagnostic portion of the exam and with the Zoom 88 support guiding sheath in the cervical right common carotid artery, a Traxcess-14 microguidewire was placed through the guiding sheath into the cervical right ICA after navigating the tight stenosis under roadmap conditions. A SpiderFX 5 mm distal protection device was then placed over the guidewire and once in position the Traxcess-14 was removed and the distal protection device wire became the working wire. Predilatation was then achieved with a 4 x 20 mm Viatrack 14+ angioplasty balloon. A 6 x 30 mm Cordis Precise Pro Rx stent was then deployed. Post dilatation was required secondary to a residual waist within the midportion of the stent and this was achieved with a 6 x 20 mm Viatrack 14+ angioplasty balloon. No residual waist or stenosis was seen. Marked improved flow intracranially is present. Patient received IV Integrilin with the bolus dose followed by maintenance drip. Dual antiplatelet therapy will be required for 3 - 6 months with follow-up carotid Doppler ultrasound at 6 months.      Acute occlusion of the right internal carotid artery and M1 segment with subsequent successful recanalization after IV TNK, but an underlying 87% stenosis of the right ICA origin present. Successful carotid artery stent placement under distal protection performed with no residual narrowing seen. Initiation of dual antiplatelet therapy required with follow-up carotid Doppler sonography as described above.   All carotid stenosis measurements were made using NASCET criteria.   This report was finalized on 9/29/2023 11:13 AM by Dr. Grady Flannery M.D.      CT Head Without Contrast Stroke Protocol    Result Date: 9/28/2023  CT HEAD WO CONTRAST STROKE PROTOCOL Date of Exam: 9/28/2023 2:07 PM EDT Indication: Neuro deficit, acute stroke suspected Neuro deficit, acute, stroke suspected. Collapsed at work. Slurred speech. Comparison: None Technique: Axial CT images were obtained of the head without contrast administration.  Reconstructed coronal and sagittal images were also obtained. Automated exposure control and iterative construction methods were used. Scan Time: 9/28/2023 at 1407 Results discussed with Dr. López in the emergency room at 9/28/2023 at 1407. Findings: Some of the images are degraded by motion. Repeated attempts at imaging were made. Preservation of the gray matter-white matter junction distinction. There is no CT evidence of acute or evolving large territory infarct. Mild deep white matter hypodensities are nonspecific but favored to represent changes of chronic microvascular disease. No mass lesion or mass effect or midline shift is seen. No intra evident. Surgical changes are seen within the posterior C1 vertebrae. No acute calvarial abnormality. Mild left maxillary sinus mucous retention cyst or polyps. Mastoid air cells are clear. No acute calvarial abnormality.     Impression: 1. No acute intracranial finding. 2. Features of mild chronic microvascular disease. Electronically Signed: Melody Becerra MD  9/28/2023 2:15 PM EDT  Workstation ID: [id number]    CT Angiogram Head w AI Analysis of LVO    Result Date: 9/28/2023  CT ANGIOGRAM HEAD W AI ANALYSIS OF LVO, CT ANGIOGRAM NECK Date of Exam: 9/28/2023 2:23 PM EDT Indication: Stroke, follow up Neuro deficit, acute stroke suspected Acute Stroke. Comparison: None available. Technique: CTA of the head and neck was performed after the uneventful intravenous  administration of iodinated contrast. Reconstructed coronal and sagittal images were also obtained. In addition, a 3-D volume rendered image was created for interpretation. Automated exposure control and iterative reconstruction methods were used. Findings: The lung apices are clear. Evaluation of the neck soft tissues demonstrates no pathologic cervical adenopathy or unexpected aerodigestive tract mass. The osseous structures demonstrate no evidence of acute fracture or aggressive osseous lesion with spondylosis change and prior cerclage wire fusion of C1-C3 noted posteriorly. Patent aortic arch with three-vessel branching. The visualized subclavian arteries are patent bilaterally. The right ICA is completely occluded through the intracranial segment. The left ICA demonstrates mild calcific atherosclerosis, with less than 50% narrowing by NASCET criteria. The vertebral arteries are normal in course and caliber bilaterally. Intracranially, there is occlusion of the right ICA, with some faint reconstitution of the distal cavernous segment, likely via patent anterior communicating and posterior communicating arteries. The right middle cerebral artery demonstrates some focal mild to moderate narrowing proximally, with subsequent complete occlusion. The anterior temporal M2 branch reconstitutes and there is some likely collateral filling of additional faint sylvian M2 branches. The left middle cerebral artery demonstrates some mild to moderate narrowing of the proximal anterior temporal M2 branch otherwise without evidence of large vessel occlusion or aneurysm. The vertebrobasilar system is patent. The posterior cerebral arteries demonstrate moderate narrowing of the right P1 segment, otherwise patent.     Impression: Complete occlusion of the right ICA from its origin through the distal cavernous intracranial segment, with likely retrograde reconstitution. There is tandem subsequent complete occlusion of the right MCA  M1 segment, with reconstitution of the anterior temporal branch and overall decreased arborization of M2 and M3 vessels through the right MCA territory, corresponding with ischemic tissue at risk on concurrently performed CT perfusion. Findings relayed to Dr. López of the emergency department by Ian Evans via the epic messaging system at 2:43 p.m. 9/28/2023. Electronically Signed: Alexi Evans MD  9/28/2023 2:43 PM EDT  Workstation ID: [id number]    CT CEREBRAL PERFUSION WITH & WITHOUT CONTRAST    Result Date: 9/28/2023  CT CEREBRAL PERFUSION W WO CONTRAST Date of Exam: 9/28/2023 2:22 PM EDT Indication: Neuro deficit, acute, stroke suspected.  Comparison: CT head without contrast 9/28/2023. Technique: Axial CT images of the brain were obtained prior to and after the administration of iodinated contrast. CT Perfusion protocol was utilized. Automated post processing was performed by RAPID software and submitted to PACS for interpretation. Automated exposure control and iterative reconstruction was utilized. Findings: On source images, large perfusion defect is demonstrated within the right frontal, parietal and temporal lobes consistent with large territory right MCA ischemia/infarct. Cerebral blood flow less than 30%: 12 mL Tmax greater than 6 seconds: 160 mL Mismatch volume: 148 mL Mismatch ratio: 13 mL     Impression: Findings consistent with large right MCA hypoperfusion and evolving infarct. The ischemic core is approximately 12 cc volume. Large ischemic penumbra is suggested. I have already discussed the major pertinent findings with neurology team prior to the time of this dictation. Electronically Signed: Melody Becerra MD  9/28/2023 2:38 PM EDT  Workstation ID: [id number]            Assessment & Plan     Acute right MCA infarct status post TNKase and cerebral angiogram clot had dissolved there was a 90% LEELEE stenosis treated with SUNDAR excessively.  Will follow neurosurgery's goals for management patient  on Integrilin drip now.  Neurosurgery is can a follow-up imaging and decide if it is safe to transition to Brilinta and aspirin.  Thankfully patient seems to have had complete resolution of his symptoms  Hypertension blood pressure control per neurosurgery's guidelines.  Restarted home medication today  Hyperlipidemia statin therapy high-dose    If bed is not needed this evening patient could be transferred out of the ICU if his 24-hour post thrombolytics CT head is stable  Gabe Mccullough Jr, MD  09/29/23  14:47 EDT    Time:

## 2023-09-29 NOTE — PLAN OF CARE
Goal Outcome Evaluation:                 NIH 0, PERRLA, A/Ox4, VSS, no complaints of pain. Integrilin continuously infusing per Dr. Flannery until pt passes speech eval. swallow study in AM. 24 hour head CT ordered for 10AM (TNK given @ 1413 9/28), see AM labs. UOP 500cc. Stroke book and stent id card given to pt and wife at bedside.

## 2023-09-29 NOTE — NURSING NOTE
Spoke with Delmy from neuro surgery regarding Integrilin drip, the order dropped off the MAR but drip still running, called to clarify if drip needed to be stopped. Ordered to stop drip, then received phone call back an ordered to re-start drip since pt unable to swallow ASA/Plavix (failed swallow study). Neuro sx to call back once able to clarify with Dr. Fitzpatrick regarding drip. The drip remains running for now.    Dr. Flannery on floor, ordered to keep Integrilin infusing til pt swallow study

## 2023-09-29 NOTE — PROGRESS NOTES
[facility] NEUROSURGERY INTRACRANIAL POSTOP note    PATIENT IDENTIFICATION:   Name:  Abel Tafoya      MRN:  [id number]     68 y.o.  male               CC:POD 1 right internal carotid artery stent placement      Subjective     Interval History: No acute issues overnight.  Symptoms improved with very mild expressive aphasia lingering.  Objective     Vital signs in last 24 hours:  Temp:  [97.6 °F (36.4 °C)-98.7 °F (37.1 °C)] 98 °F (36.7 °C)  Heart Rate:  [] 61  Resp:  [12-20] 16  BP: ()/() 104/54      Intake/Output this shift:  No intake/output data recorded.    Intake/Output last 3 shifts:  I/O last 3 completed shifts:  In: 999 [I.V.:999]  Out: 500 [Urine:500]      LABS:  .  Results from last 7 days   Lab Units 09/29/23  0311 09/28/23  1403   WBC 10*3/mm3 11.10* 10.20   HEMOGLOBIN g/dL 13.4 14.6   HEMATOCRIT % 39.6 43.2   PLATELETS 10*3/mm3 312 359     .  Results from last 7 days   Lab Units 09/29/23  0311 09/28/23  1403   SODIUM mmol/L 138 140   POTASSIUM mmol/L 4.8 3.6   CHLORIDE mmol/L 107 102   CO2 mmol/L 19.3* 24.0   BUN mg/dL 14 16   CREATININE mg/dL 0.93 1.16   CALCIUM mg/dL 8.5* 9.8   BILIRUBIN mg/dL  --  0.3   ALK PHOS U/L  --  72   ALT (SGPT) U/L  --  30   AST (SGOT) U/L  --  24   GLUCOSE mg/dL 137* 105*          IMAGING STUDIES:  CT head without contrast 9/29/2023:   Since CT scan obtained 9/28/2023, patient has developed area of hypodensity within lateral aspect of right frontal lobe measuring 15 x 12 mm compatible with area of acute completed infarction within right MCA distribution.  No evidence of hemorrhagic transformation.  No significant mass effect.      I personally viewed and interpreted the patient's labs, medications and chart.    Meds reviewed/changed: Yes    Current Facility-Administered Medications:     acetaminophen (TYLENOL) tablet 650 mg, 650 mg, Oral, Q4H PRN **OR** acetaminophen (TYLENOL) suppository 650 mg, 650 mg, Rectal, Q4H PRN, Gabe Mccullough Jr., MD     amLODIPine (NORVASC) tablet 5 mg, 5 mg, Oral, Daily, Grady Flannery MD, 5 mg at 09/29/23 0916    aspirin chewable tablet 81 mg, 81 mg, Oral, Daily, Gene Bassett APRN    atorvastatin (LIPITOR) tablet 80 mg, 80 mg, Oral, Daily, Gbae Mccullough Jr., MD, 80 mg at 09/29/23 0916    sennosides-docusate (PERICOLACE) 8.6-50 MG per tablet 2 tablet, 2 tablet, Oral, BID, 2 tablet at 09/29/23 0916 **AND** polyethylene glycol (MIRALAX) packet 17 g, 17 g, Oral, Daily PRN **AND** bisacodyl (DULCOLAX) EC tablet 5 mg, 5 mg, Oral, Daily PRN **AND** bisacodyl (DULCOLAX) suppository 10 mg, 10 mg, Rectal, Daily PRN, Gabe Mccullough Jr., MD    Calcium Replacement - Follow Nurse / BPA Driven Protocol, , Does not apply, PRN, Gabe Mccullough Jr., MD    fluticasone (FLONASE) 50 MCG/ACT nasal spray 2 spray, 2 spray, Nasal, Daily, Grady Flannery MD, 2 spray at 09/29/23 0916    indomethacin (INDOCIN) capsule 50 mg, 50 mg, Oral, TID PRN, Grady Flannery MD    labetalol (NORMODYNE,TRANDATE) injection 20 mg, 20 mg, Intravenous, Q10 Min PRN, Gaeb Mccullough Jr., MD, 20 mg at 09/28/23 1805    Magnesium Standard Dose Replacement - Follow Nurse / BPA Driven Protocol, , Does not apply, PRN, Gabe Mccullough Jr., MD    montelukast (SINGULAIR) tablet 10 mg, 10 mg, Oral, Nightly, Grady Flannery MD    niCARdipine (CARDENE) 25 mg in 250 mL NS infusion kit, 5-15 mg/hr, Intravenous, Titrated, Gabe Mccullough Jr., MD    nitroglycerin (NITROSTAT) SL tablet 0.4 mg, 0.4 mg, Sublingual, Q5 Min PRN, Gabe Mccullough Jr., MD    ondansetron (ZOFRAN) injection 4 mg, 4 mg, Intravenous, Q6H PRN, Grady Flannery MD    Phosphorus Replacement - Follow Nurse / BPA Driven Protocol, , Does not apply, PRN, Gabe Mccullough Jr., MD    Potassium Replacement - Follow Nurse / BPA Driven Protocol, , Does not apply, PRN, Gabe Mccullough Jr., MD    sodium chloride 0.9 % flush 10 mL, 10 mL, Intravenous, Q12H, Gabe Mccullough  MD Kristine, 10 mL at 09/29/23 0926    sodium chloride 0.9 % flush 10 mL, 10 mL, Intravenous, PRN, Gabe Mccullough Jr., MD    sodium chloride 0.9 % infusion 40 mL, 40 mL, Intravenous, PRN, Gabe Mccullough Jr., MD    sodium chloride 0.9 % infusion, 100 mL/hr, Intravenous, Continuous, Gabe Mccullough Jr., MD, Last Rate: 100 mL/hr at 09/29/23 0605, 100 mL/hr at 09/29/23 0605    ticagrelor (BRILINTA) tablet 90 mg, 90 mg, Oral, BID, Gene Bassett APRN      Physical Exam:    General:   Awake, alert, oriented x3. Speech clear with very mild expressive aphasia    Neck:    supple  CN II:    Visual fields full to confrontation  CN III IV VI:   Extraocular movements are full , PERRL 3 mm equal and brisk to light  CN VII:    Facial movements are symmetric. No weakness.  Motor:    Normal muscle strength, bulk and tone in upper and lower extremities.  No fasciculations, rigidity, spasticity, or abnormal movements.  Reflexes:   Plantar responses are flexor.   Sensation:   Normal to light touch; no extinction  Station and Gait:  Per speech therapy note 9/29/2023:: Patient seen for clinical swallow assessment. Oral mech exam unremarkable. No overt s/s of aspiration with ice, thins via cup/straw, puree, mixed mech soft, or regular solids. SLP recs regular and thins. Meds as dolly. Will follow for further assessment pending MRI results   Coordination:   Finger-to-nose and heel-to-shin test shows no dysmetria.  Extremities:   Wearing SCD    Assessment & Plan     ASSESSMENT:      Right sided cerebral hemisphere cerebrovascular accident (CVA)    Patient who had acute stroke and is now day 1 status post right ICA stent placement with significant resolution of symptoms.  He has passed swallow evaluation and can come off IV Integrilin and start daily aspirin and twice daily Brilinta.  He will need to remain on these for now.  He is fine to leave intensive care unit.  Neurosurgery will follow-up in 2 weeks for groin check.   "Follow-up head CT today does show area of completed stroke as expected.  Neurosurgery will sign off at this time.    PLAN:     Discontinue Integrilin  Start daily aspirin 81 mg  Start Brilinta 90 mg twice daily  Patient to continue daily aspirin and twice daily Brilinta after discharge  Okay to leave ICU  Neurosurgical follow-up in 2 weeks for groin check  Neurosurgery signing off    I discussed the patient's findings and my recommendations with patient, family, and Dr. Fitzpatrick.    During patient visit, I utilized appropriate personal protective equipment including mask and gloves.  Mask used was standard procedure mask. Appropriate PPE was worn during the entire visit.  Hand hygiene was completed before and after.      LOS: 1 day       Gene Dennis Bassett, APRN  9/29/2023  11:33 EDT    \"Dictated utilizing Dragon dictation\".    "

## 2023-09-30 ENCOUNTER — APPOINTMENT (OUTPATIENT)
Dept: CARDIOLOGY | Facility: HOSPITAL | Age: 68
DRG: 035 | End: 2023-09-30
Payer: MEDICARE

## 2023-09-30 ENCOUNTER — APPOINTMENT (OUTPATIENT)
Dept: MRI IMAGING | Facility: HOSPITAL | Age: 68
DRG: 035 | End: 2023-09-30
Payer: MEDICARE

## 2023-09-30 LAB
AORTIC DIMENSIONLESS INDEX: 0.8 (DI)
ASCENDING AORTA: 2.8 CM
BH CV ECHO MEAS - ACS: 1.5 CM
BH CV ECHO MEAS - AI P1/2T: 671 MSEC
BH CV ECHO MEAS - AO MAX PG: 9.1 MMHG
BH CV ECHO MEAS - AO MEAN PG: 5 MMHG
BH CV ECHO MEAS - AO ROOT DIAM: 3.2 CM
BH CV ECHO MEAS - AO V2 MAX: 151 CM/SEC
BH CV ECHO MEAS - AO V2 VTI: 30.8 CM
BH CV ECHO MEAS - AVA(I,D): 1.8 CM2
BH CV ECHO MEAS - EDV(CUBED): 50.7 ML
BH CV ECHO MEAS - EDV(MOD-SP2): 112 ML
BH CV ECHO MEAS - EDV(MOD-SP4): 107 ML
BH CV ECHO MEAS - EF(MOD-BP): 50.9 %
BH CV ECHO MEAS - EF(MOD-SP2): 52.7 %
BH CV ECHO MEAS - EF(MOD-SP4): 51.4 %
BH CV ECHO MEAS - ESV(CUBED): 17.6 ML
BH CV ECHO MEAS - ESV(MOD-SP2): 53 ML
BH CV ECHO MEAS - ESV(MOD-SP4): 52 ML
BH CV ECHO MEAS - FS: 29.7 %
BH CV ECHO MEAS - IVS/LVPW: 1 CM
BH CV ECHO MEAS - IVSD: 1.4 CM
BH CV ECHO MEAS - LAT PEAK E' VEL: 14.8 CM/SEC
BH CV ECHO MEAS - LV MASS(C)D: 186.9 GRAMS
BH CV ECHO MEAS - LV MAX PG: 3.5 MMHG
BH CV ECHO MEAS - LV MEAN PG: 2 MMHG
BH CV ECHO MEAS - LV V1 MAX: 94.2 CM/SEC
BH CV ECHO MEAS - LV V1 VTI: 24.4 CM
BH CV ECHO MEAS - LVIDD: 3.7 CM
BH CV ECHO MEAS - LVIDS: 2.6 CM
BH CV ECHO MEAS - LVOT AREA: 2.27 CM2
BH CV ECHO MEAS - LVOT DIAM: 1.7 CM
BH CV ECHO MEAS - LVPWD: 1.4 CM
BH CV ECHO MEAS - MED PEAK E' VEL: 4.9 CM/SEC
BH CV ECHO MEAS - MR MAX PG: 10.6 MMHG
BH CV ECHO MEAS - MR MAX VEL: 163 CM/SEC
BH CV ECHO MEAS - MV A DUR: 0.14 SEC
BH CV ECHO MEAS - MV A MAX VEL: 68.9 CM/SEC
BH CV ECHO MEAS - MV DEC SLOPE: 525 CM/SEC2
BH CV ECHO MEAS - MV DEC TIME: 0.2 SEC
BH CV ECHO MEAS - MV E MAX VEL: 59.1 CM/SEC
BH CV ECHO MEAS - MV E/A: 0.86
BH CV ECHO MEAS - MV MAX PG: 4.2 MMHG
BH CV ECHO MEAS - MV MEAN PG: 1 MMHG
BH CV ECHO MEAS - MV P1/2T: 60.8 MSEC
BH CV ECHO MEAS - MV V2 VTI: 29.7 CM
BH CV ECHO MEAS - MVA(P1/2T): 3.6 CM2
BH CV ECHO MEAS - MVA(VTI): 1.86 CM2
BH CV ECHO MEAS - PA ACC TIME: 0.1 SEC
BH CV ECHO MEAS - PA V2 MAX: 109 CM/SEC
BH CV ECHO MEAS - PULM A REVS DUR: 0.11 SEC
BH CV ECHO MEAS - PULM A REVS VEL: 30.9 CM/SEC
BH CV ECHO MEAS - PULM DIAS VEL: 27.1 CM/SEC
BH CV ECHO MEAS - PULM S/D: 0.97
BH CV ECHO MEAS - PULM SYS VEL: 26.2 CM/SEC
BH CV ECHO MEAS - QP/QS: 1.34
BH CV ECHO MEAS - RAP SYSTOLE: 3 MMHG
BH CV ECHO MEAS - RV MAX PG: 3.1 MMHG
BH CV ECHO MEAS - RV V1 MAX: 87.5 CM/SEC
BH CV ECHO MEAS - RV V1 VTI: 19.5 CM
BH CV ECHO MEAS - RVOT DIAM: 2.2 CM
BH CV ECHO MEAS - RVSP: 8 MMHG
BH CV ECHO MEAS - SV(LVOT): 55.4 ML
BH CV ECHO MEAS - SV(MOD-SP2): 59 ML
BH CV ECHO MEAS - SV(MOD-SP4): 55 ML
BH CV ECHO MEAS - SV(RVOT): 74.1 ML
BH CV ECHO MEAS - TR MAX PG: 4.8 MMHG
BH CV ECHO MEAS - TR MAX VEL: 109 CM/SEC
BH CV ECHO MEASUREMENTS AVERAGE E/E' RATIO: 6
BH CV ECHO SHUNT ASSESSMENT PERFORMED (HIDDEN SCRIPTING): 1
BH CV XLRA - RV BASE: 3.2 CM
BH CV XLRA - RV LENGTH: 7.6 CM
BH CV XLRA - RV MID: 3.2 CM
BH CV XLRA - TDI S': 11.7 CM/SEC
GLUCOSE BLDC GLUCOMTR-MCNC: 118 MG/DL (ref 70–130)
GLUCOSE BLDC GLUCOMTR-MCNC: 124 MG/DL (ref 70–130)
HBA1C MFR BLD: 6.1 % (ref 4.8–5.6)
LEFT ATRIUM VOLUME INDEX: 20 ML/M2
SINUS: 3.5 CM
STJ: 2.42 CM

## 2023-09-30 PROCEDURE — 82948 REAGENT STRIP/BLOOD GLUCOSE: CPT

## 2023-09-30 PROCEDURE — 92523 SPEECH SOUND LANG COMPREHEN: CPT

## 2023-09-30 PROCEDURE — 93306 TTE W/DOPPLER COMPLETE: CPT | Performed by: INTERNAL MEDICINE

## 2023-09-30 PROCEDURE — 97162 PT EVAL MOD COMPLEX 30 MIN: CPT

## 2023-09-30 PROCEDURE — 99233 SBSQ HOSP IP/OBS HIGH 50: CPT | Performed by: PSYCHIATRY & NEUROLOGY

## 2023-09-30 PROCEDURE — 97165 OT EVAL LOW COMPLEX 30 MIN: CPT

## 2023-09-30 PROCEDURE — 83036 HEMOGLOBIN GLYCOSYLATED A1C: CPT | Performed by: STUDENT IN AN ORGANIZED HEALTH CARE EDUCATION/TRAINING PROGRAM

## 2023-09-30 PROCEDURE — 70551 MRI BRAIN STEM W/O DYE: CPT

## 2023-09-30 PROCEDURE — 25510000001 PERFLUTREN (DEFINITY) 8.476 MG IN SODIUM CHLORIDE (PF) 0.9 % 10 ML INJECTION: Performed by: STUDENT IN AN ORGANIZED HEALTH CARE EDUCATION/TRAINING PROGRAM

## 2023-09-30 PROCEDURE — 93306 TTE W/DOPPLER COMPLETE: CPT

## 2023-09-30 RX ADMIN — MONTELUKAST SODIUM 10 MG: 10 TABLET, FILM COATED ORAL at 21:35

## 2023-09-30 RX ADMIN — ASPIRIN 81 MG: 81 TABLET, CHEWABLE ORAL at 08:42

## 2023-09-30 RX ADMIN — Medication 10 ML: at 21:35

## 2023-09-30 RX ADMIN — PERFLUTREN 3 ML: 6.52 INJECTION, SUSPENSION INTRAVENOUS at 10:55

## 2023-09-30 RX ADMIN — Medication 10 ML: at 08:43

## 2023-09-30 RX ADMIN — TICAGRELOR 90 MG: 90 TABLET ORAL at 21:35

## 2023-09-30 RX ADMIN — AMLODIPINE BESYLATE 5 MG: 5 TABLET ORAL at 08:42

## 2023-09-30 RX ADMIN — SODIUM CHLORIDE 100 ML/HR: 9 INJECTION, SOLUTION INTRAVENOUS at 19:16

## 2023-09-30 RX ADMIN — SENNOSIDES AND DOCUSATE SODIUM 2 TABLET: 50; 8.6 TABLET ORAL at 21:35

## 2023-09-30 RX ADMIN — ATORVASTATIN CALCIUM 80 MG: 80 TABLET, FILM COATED ORAL at 08:42

## 2023-09-30 RX ADMIN — FLUTICASONE PROPIONATE 2 SPRAY: 50 SPRAY, METERED NASAL at 08:42

## 2023-09-30 RX ADMIN — TICAGRELOR 90 MG: 90 TABLET ORAL at 08:42

## 2023-09-30 NOTE — PLAN OF CARE
Goal Outcome Evaluation:  Plan of Care Reviewed With: patient   Outcome Evaluation: Based on informal assessment, speech/language/cognition WFL. Pt demonstrates adequate expressive and receptive language abilities based on informal assessment. No instances of anomia or paraphasias. Pt follows all commands and answers all questions appropriately. Cognitive function WFL as well. Indicated mild immeidate memory deficits, however shows improvement with repetition strategy. Delayed recall, attention, reasoning, thought oragnization 100% indep. Problem solving WFL. Pt reports he is functioning at baseline, SO endorses this. No skilled ST warranted for S/L/cog. Speech to follow for swallowing as appropriate.

## 2023-09-30 NOTE — PLAN OF CARE
Goal Outcome Evaluation:  Plan of Care Reviewed With: patient, spouse           Outcome Evaluation: OT-Pt. admitted to [facility] with acute R MCA infarct. PLOF was indep. with self care and ambulating indep. Pt. presents with min impaired balance, decreased functional endurance, min decrease ADL performance. LE dressing SBA seated EOB. Ambulated to commode with CGA HHA. SOB noted after commode transfer with min unsteadiness when standing. Discussed safety at home for tub/shower transfers. Pt. may benefit from OT short term to maximize indep. and safety during self care/functional mobility. Anticipate D/c to home with assist.

## 2023-09-30 NOTE — PROGRESS NOTES
"DOS: 2023  NAME: Abel Tafoya   : 1955  PCP: Cherelle Vila, SHABBIR  No chief complaint on file.      Chief complaint: stroke  Subjective: Patient is new to me today.  This is a 68-year-old man who was taken emergently to [facility] with right MCA syndrome.  He was found to have a right MCA occlusion with a right carotid stenosis.  He received tenecteplase and was transferred here.  He underwent a right MCA thrombectomy and a right carotid stent.  Postprocedure he is essentially back to his neurologic baseline.  He denies a prior history of stroke or coronary artery disease.    Objective:  Vital signs: /65   Pulse 72   Temp 97.7 °F (36.5 °C) (Oral)   Resp 16   Ht 167 cm (65.75\")   Wt 95 kg (209 lb 7 oz)   SpO2 96%   BMI 34.06 kg/m²    Gen: NAD, vitals reviewed  MS: oriented x3, recent/remote memory intact, normal attention/concentration, language intact, no neglect.  CN: visual acuity grossly normal, PERRL, EOMI, no facial droop, no dysarthria  Motor: 5/5 throughout upper and lower extremities, normal tone  Sensory: intact to light touch all 4 ext.    Laboratory results:  Lab Results   Component Value Date    GLUCOSE 137 (H) 2023    CALCIUM 8.5 (L) 2023     2023    K 4.8 2023    CO2 19.3 (L) 2023     2023    BUN 14 2023    CREATININE 0.93 2023    EGFRIFNONA 71 2021    BCR 15.1 2023    ANIONGAP 11.7 2023     Lab Results   Component Value Date    WBC 11.10 (H) 2023    HGB 13.4 2023    HCT 39.6 2023    MCV 96.8 2023     2023     Lab Results   Component Value Date     (H) 2023     (H) 2023     (H) 2022         Lab 23  0313   HEMOGLOBIN A1C 6.10*        Review of labs: , CBC, BMP unremarkable    Review and interpretation of imaging: I personally reviewed his CTA of the head and neck performed at [facility] " which was significant for a right proximal internal carotid artery occlusion with subsequent occlusion of the right M1 segment.  Radiology report reviewed.  MRI brain ordered    Diagnoses:  Stroke due to right carotid occlusion, suspected underlying right carotid atherosclerosis  Status post right carotid stent    Comment: 68-year-old man with right ICA/MCA occlusion felt to be secondary to underlying right ICA atherosclerosis with acute plaque rupture and thrombosis.  He is doing extremely well after tenecteplase, thrombectomy and stent placement.    Plan:  1.  Continue aspirin, Brilinta  2.  High-dose statin  3.  Okay to transfer out of ICU today  4.  MRI brain, 2D echocardiogram    Management discussed with Dr. Tineo

## 2023-09-30 NOTE — PROGRESS NOTES
[facility]  [phone]  Dr. Edison Tineo    Subjective:  LOS: 2    Chief Complaint:  Acute stroke    Back to baseline. Denies any weakness. Having some difficulty urinating. States no problems at home.    Objective   Vital Signs past 24hrs  Temp range: Temp (24hrs), Av.9 °F (36.6 °C), Min:97.3 °F (36.3 °C), Max:98.3 °F (36.8 °C)    BP range: BP: (103-149)/(59-88) 118/61  Pulse range: Heart Rate:  [54-86] 81  Resp rate range: Resp:  [16] 16  Device (Oxygen Therapy): room airFlow (L/min):  [2] 2  Oxygen range:SpO2:  [88 %-100 %] 95 %       Physical Exam  Constitutional:       Appearance: He is obese.   Eyes:      Pupils: Pupils are equal, round, and reactive to light.   Cardiovascular:      Rate and Rhythm: Normal rate and regular rhythm.      Heart sounds: No murmur heard.  Pulmonary:      Effort: Pulmonary effort is normal.      Breath sounds: Normal breath sounds.   Abdominal:      General: Bowel sounds are normal.      Palpations: Abdomen is soft. There is no mass.      Tenderness: There is no abdominal tenderness.   Musculoskeletal:         General: No swelling.   Neurological:      Mental Status: He is alert.     Results Review:    I have reviewed the laboratory and imaging data since the last note by [facility] physician.  My annotations are noted in assessment and plan.      Result Review:  I have personally reviewed the results from last note by [facility] physician to 2023 14:43 EDT and agree with these findings:  []  Laboratory list / accordion  []  Microbiology  []  Radiology  []  EKG/Telemetry   []  Cardiology/Vascular   []  Pathology  []  Old records  []  Other:    Medication Review:  I have reviewed the current MAR.  My annotations are noted in assessment and plan.    amLODIPine, 5 mg, Oral, Daily  aspirin, 81 mg, Oral, Daily  atorvastatin, 80 mg, Oral, Daily  fluticasone, 2 spray, Nasal, Daily  montelukast, 10 mg, Oral, Nightly  senna-docusate sodium, 2 tablet, Oral, BID  sodium chloride,  10 mL, Intravenous, Q12H  ticagrelor, 90 mg, Oral, BID        niCARdipine, 5-15 mg/hr  sodium chloride, 100 mL/hr, Last Rate: 100 mL/hr (09/29/23 0605)      Lines, Drains & Airways       Active LDAs       Name Placement date Placement time Site Days    Peripheral IV 09/28/23 1401 Left Antecubital 09/28/23  1401  Antecubital  2    Peripheral IV 09/28/23 1401 Right Antecubital 09/28/23  1401  Antecubital  2                  Isolation status: No active isolations    Dietary Orders (From admission, onward)       Start     Ordered    09/29/23 0911  Diet: Regular/House Diet; Texture: Regular Texture (IDDSI 7); Fluid Consistency: Thin (IDDSI 0)  Diet Effective Now        References:    Diet Order [place]   Question Answer Comment   Diets: Regular/House Diet    Texture: Regular Texture (IDDSI 7)    Fluid Consistency: Thin (IDDSI 0)        09/29/23 0910                    PCCM Problems  Right MCA syndrome with right ICA stenosis  Status post right carotid stent  Hypertension  Likely BPH      THESE ARE NEW MEDICAL PROBLEMS TO ME.    Plan of Treatment    Discussed with neurology.  Patient is back to baseline.  MRI pending.  However they are okay with transfer out of ICU.    Patient having some urinary retention likely from undiagnosed BPH.  We will see how he does once he is up and moving around.  If persists may need as needed straight cath.  May need urology input.    Note patient has as needed indomethacin ordered.    Edison Tineo MD  09/30/23  14:43 EDT      Part of this note may be an electronic transcription/translation of spoken language to printed text using the Dragon Dictation System.

## 2023-09-30 NOTE — THERAPY EVALUATION
Patient Name: Abel Tafoya  : 1955    MRN: [id number]                              Today's Date: 2023       Admit Date: 2023    Visit Dx: No diagnosis found.  Patient Active Problem List   Diagnosis    Encounter for screening    Welcome to Medicare preventive visit    History of colonic polyps    Hyperlipidemia    Prostate nodule    Flu vaccine need    Weakness    Vitamin D deficiency    History of elevated PSA    Secondary hypertension    Need for hepatitis C screening test    Class 1 obesity with serious comorbidity and body mass index (BMI) of 30.0 to 30.9 in adult    Psoriasis    Right sided cerebral hemisphere cerebrovascular accident (CVA)     Past Medical History:   Diagnosis Date    Allergic     Broken neck     Colon tumor     Hyperlipidemia     Hypertension      Past Surgical History:   Procedure Laterality Date    APPENDECTOMY      COLON BIOPSY      NECK SURGERY      broken neck    SKIN GRAFT      TUMOR EXCISION      colon      General Information       Row Name 23 1304          OT Time and Intention    Document Type evaluation  -CW     Mode of Treatment occupational therapy  -CW       Row Name 23 1304          General Information    Patient Profile Reviewed yes  -CW     Prior Level of Function independent:;ADL's  -CW     Existing Precautions/Restrictions fall  -CW       Row Name 23 1304          Living Environment    People in Home spouse  -CW       Row Name 23 1304          Cognition    Orientation Status (Cognition) oriented x 4  -CW       Row Name 23 1304          Safety Issues, Functional Mobility    Safety Issues Affecting Function (Mobility) insight into deficits/self-awareness  -CW     Impairments Affecting Function (Mobility) balance;endurance/activity tolerance  -CW               User Key  (r) = Recorded By, (t) = Taken By, (c) = Cosigned By      Initials Name Provider Type    Venice Anaya OTR Occupational Therapist                      Mobility/ADL's       Row Name 09/30/23 1305          Bed Mobility    Bed Mobility sit-supine  -CW     Sit-Supine [place] (Bed Mobility) standby assist  -CW     Assistive Device (Bed Mobility) bed rails;head of bed elevated  -CW       Row Name 09/30/23 1305          Transfers    Transfers toilet transfer  -CW       Row Name 09/30/23 1305          Toilet Transfer    Type (Toilet Transfer) stand pivot/stand step  -CW     [place] Level (Toilet Transfer) contact guard  -CW     Assistive Device (Toilet Transfer) grab bars/safety frame;commode  -CW     Comment, (Toilet Transfer) Unsteady sit to stand from commode.  -CW       Row Name 09/30/23 1305          Activities of Daily Living    BADL Assessment/Intervention lower body dressing;feeding  -CW       Row Name 09/30/23 1305          Lower Body Dressing Assessment/Training    [place] Level (Lower Body Dressing) lower body dressing skills;shoes/slippers;standby assist  -CW     Position (Lower Body Dressing) edge of bed sitting  -       Row Name 09/30/23 1305          Self-Feeding Assessment/Training    Comment, (Feeding) Pt. reports some difficulty opening packages on tray.  -CW               User Key  (r) = Recorded By, (t) = Taken By, (c) = Cosigned By      Initials Name Provider Type    Venice Anaya OTR Occupational Therapist                   Obj/Interventions       Row Name 09/30/23 1309          Range of Motion Comprehensive    General Range of Motion bilateral upper extremity ROM WNL  -       Row Name 09/30/23 1309          Strength Comprehensive (MMT)    Comment, General Manual Muscle Testing (MMT) Assessment 4+/5 BUE's general.  -       Row Name 09/30/23 1309          Motor Skills    Motor Skills functional endurance  -CW     Functional Endurance fair-mild SOB noted.  -CW               User Key  (r) = Recorded By, (t) = Taken By, (c) = Cosigned By      Initials Name Provider Type    Venice Anaya OTR Occupational Therapist                    Goals/Plan       Row Name 09/30/23 1318          Transfer Goal 1 (OT)    Activity/Assistive Device (Transfer Goal 1, OT) toilet;bed-to-chair/chair-to-bed;sit-to-stand/stand-to-sit;commode  -CW     [place] Level/Cues Needed (Transfer Goal 1, OT) independent  -CW     Time Frame (Transfer Goal 1, OT) 2 weeks  -CW       Row Name 09/30/23 1318          Dressing Goal 1 (OT)    Activity/Device (Dressing Goal 1, OT) dressing skills, all;lower body dressing  -CW     [place]/Cues Needed (Dressing Goal 1, OT) standby assist  -CW     Time Frame (Dressing Goal 1, OT) 2 weeks  -CW       Row Name 09/30/23 1318          Toileting Goal 1 (OT)    Activity/Device (Toileting Goal 1, OT) toileting skills, all;adjust/manage clothing;perform perineal hygiene;commode  -CW     [place] Level/Cues Needed (Toileting Goal 1, OT) independent  -CW     Time Frame (Toileting Goal 1, OT) 2 weeks  -CW       Row Name 09/30/23 1318          Therapy Assessment/Plan (OT)    Planned Therapy Interventions (OT) activity tolerance training;BADL retraining;transfer/mobility retraining  -CW               User Key  (r) = Recorded By, (t) = Taken By, (c) = Cosigned By      Initials Name Provider Type    Venice Anaya OTR Occupational Therapist                   Clinical Impression       Row Name 09/30/23 1305          Pain Assessment    Pretreatment Pain Rating 0/10 - no pain  -CW     Posttreatment Pain Rating 0/10 - no pain  -CW       Row Name 09/30/23 1309          Plan of Care Review    Plan of Care Reviewed With patient;spouse  -CW     Outcome Evaluation OT-Pt. admitted to [facility] with acute R MCA infarct. PLOF was indep. with self care and ambulating indep. Pt. presents with min impaired balance, decreased functional endurance, min decrease ADL performance. LE dressing SBA seated EOB. Ambulated to commode with CGA HHA. SOB noted after commode transfer with min unsteadiness when standing. Discussed safety at home for tub/shower transfers.  Pt. may benefit from OT short term to maximize indep. and safety during self care/functional mobility. Anticipate D/c to home with assist.  -CW       Row Name 09/30/23 1309          Therapy Assessment/Plan (OT)    Therapy Frequency (OT) 5 times/wk  -CW       Row Name 09/30/23 1309          Positioning and Restraints    Pre-Treatment Position in bed  -CW     Post Treatment Position bed  -CW     In Bed with family/caregiver;call light within reach;sitting;encouraged to call for assist  -CW               User Key  (r) = Recorded By, (t) = Taken By, (c) = Cosigned By      Initials Name Provider Type    CW Venice Anguiano OTR Occupational Therapist                   Outcome Measures       Row Name 09/30/23 1319          How much help from another is currently needed...    Putting on and taking off regular lower body clothing? 3  -CW     Bathing (including washing, rinsing, and drying) 3  -CW     Toileting (which includes using toilet bed pan or urinal) 3  -CW     Putting on and taking off regular upper body clothing 4  -CW     Taking care of personal grooming (such as brushing teeth) 3  -CW     Eating meals 3  -CW     AM-PAC 6 Clicks Score (OT) 19  -CW       Row Name 09/30/23 0800          How much help from another person do you currently need...    Turning from your back to your side while in flat bed without using bedrails? 4  -BL     Moving from lying on back to sitting on the side of a flat bed without bedrails? 4  -BL     Moving to and from a bed to a chair (including a wheelchair)? 4  -BL     Standing up from a chair using your arms (e.g., wheelchair, bedside chair)? 3  -BL     Climbing 3-5 steps with a railing? 3  -BL     To walk in hospital room? 4  -BL     AM-PAC 6 Clicks Score (PT) 22  -BL     Highest level of mobility 7 --> Walked 25 feet or more  -BL       Row Name 09/30/23 1319          Functional Assessment    Outcome Measure Options AM-PAC 6 Clicks Daily Activity (OT)  -CW               User Key  (r) =  Recorded By, (t) = Taken By, (c) = Cosigned By      Initials Name Provider Type     Venice Anguiano OTR Occupational Therapist    Vaughn Griffin RN Registered Nurse                    Occupational Therapy Education       Title: PT OT SLP Therapies (Done)       Topic: Occupational Therapy (Done)       Point: ADL training (Done)       Description:   Instruct learner(s) on proper safety adaptation and remediation techniques during self care or transfers.   Instruct in proper use of assistive devices.                  Learning Progress Summary             Patient Acceptance, E, VU by ANNE at 9/30/2023 1320    Comment: Role of OT and poc.                                         User Key       Initials Effective Dates Name Provider Type Discipline     06/16/21 -  Venice Anguiano OTR Occupational Therapist OT                  OT Recommendation and Plan  Planned Therapy Interventions (OT): activity tolerance training, BADL retraining, transfer/mobility retraining  Therapy Frequency (OT): 5 times/wk  Plan of Care Review  Plan of Care Reviewed With: patient, spouse  Outcome Evaluation: OT-Pt. admitted to [facility] with acute R MCA infarct. PLOF was indep. with self care and ambulating indep. Pt. presents with min impaired balance, decreased functional endurance, min decrease ADL performance. LE dressing SBA seated EOB. Ambulated to commode with CGA HHA. SOB noted after commode transfer with min unsteadiness when standing. Discussed safety at home for tub/shower transfers. Pt. may benefit from OT short term to maximize indep. and safety during self care/functional mobility. Anticipate D/c to home with assist.     Time Calculation:   Evaluation Complexity (OT)  Review Occupational Profile/Medical/Therapy History Complexity: brief/low complexity  Assessment, Occupational Performance/Identification of Deficit Complexity: 1-3 performance deficits  Clinical Decision Making Complexity (OT): problem focused assessment/low  complexity  Overall Complexity of Evaluation (OT): low complexity     Time Calculation- OT       Row Name 09/30/23 1320             Time Calculation- OT    OT Start Time 1247  -CW      OT Stop Time 1307  -CW      OT Time Calculation (min) 20 min  -CW      Total Timed Code Minutes- OT 5 minute(s)  -CW      OT Received On 09/30/23  -CW      OT - Next Appointment 10/02/23  -CW      OT Goal Re-Cert Due Date 10/14/23  -CW         Timed Charges    30828 - OT Self Care/Mgmt Minutes 5  -CW         Total Minutes    Timed Charges Total Minutes 5  -CW       Total Minutes 5  -CW                User Key  (r) = Recorded By, (t) = Taken By, (c) = Cosigned By      Initials Name Provider Type    CW Venice Anguiano OTR Occupational Therapist                  Therapy Charges for Today       Code Description Service Date Service Provider Modifiers Qty    17788374075 HC OT EVAL LOW COMPLEXITY 2 9/30/2023 Venice Anguiano OTR GO 1                 LUIS FERNANDO Gambino  9/30/2023

## 2023-09-30 NOTE — PLAN OF CARE
Goal Outcome Evaluation:  Plan of Care Reviewed With: patient, spouse        Progress: improving  Outcome Evaluation: Pt seen for PT this afternoon. He was admitted w acute R MCA. Pt s/p TNK and thrombectomy. At baseline, pt was independent w all mobility and ADLs and lives at home w his wife. Today, pt reports he feels he is back to his baseline. His strength is good and symmetrical. He was independent w all mobility today. He was able to ambulate approx 200 ft w no unsteadiness noted. He is hopeful to return home upon DC soon. No further acute PT needs at this time. Will sign off.      Anticipated Discharge Disposition (PT): home with assist

## 2023-09-30 NOTE — THERAPY EVALUATION
Acute Care - Speech Language Pathology Initial Evaluation  [facility]     Patient Name: Abel Tafoya  : 1955  MRN: [id number]  Today's Date: 2023               Admit Date: 2023     Visit Dx:  No diagnosis found.  Patient Active Problem List   Diagnosis    Encounter for screening    Welcome to Medicare preventive visit    History of colonic polyps    Hyperlipidemia    Prostate nodule    Flu vaccine need    Weakness    Vitamin D deficiency    History of elevated PSA    Secondary hypertension    Need for hepatitis C screening test    Class 1 obesity with serious comorbidity and body mass index (BMI) of 30.0 to 30.9 in adult    Psoriasis    Right sided cerebral hemisphere cerebrovascular accident (CVA)     Past Medical History:   Diagnosis Date    Allergic     Broken neck     Colon tumor     Hyperlipidemia     Hypertension      Past Surgical History:   Procedure Laterality Date    APPENDECTOMY      COLON BIOPSY      NECK SURGERY      broken neck    SKIN GRAFT      TUMOR EXCISION      colon       SLP Recommendation and Plan              SLC Criteria for Skilled Therapy Interventions Met: no problems identified which require skilled intervention (23 1100)  Anticipated Discharge Disposition (SLP): No further SLP services warranted (23 1100)                 Daily Summary of Progress (SLP): progress toward functional goals is good (23 1100)                    Plan of Care Reviewed With: patient (23 1205)  Outcome Evaluation: Based on informal assessment, speech/language/cognition WFL. Pt demonstrates adequate expressive and receptive language abilities based on informal assessment. No instances of anomia or paraphasias. Pt follows all commands and answers all questions appropriately. Cognitive function WFL as well. Indicated mild immeidate memory deficits, however shows improvement with repetition strategy. Delayed recall, attention, reasoning, thought oragnization 100% indep.  Problem solving WFL. Pt reports he is functioning at baseline, SO endorses this. No skilled ST warranted for S/L/cog. Speech to follow for swallowing as appropriate. (09/30/23 1205)      SLP EVALUATION (last 72 hours)       SLP SLC Evaluation       Row Name 09/30/23 1100       Communication Assessment/Intervention    Document Type evaluation  -HF    Subjective Information no complaints  -HF    Patient Observations alert;cooperative;agree to therapy  -HF    Patient Effort excellent  -HF       General Information    Patient Profile Reviewed yes  -HF    Pertinent History Of Current Problem R MCA s/p TNK  -HF    Precautions/Limitations, Vision WFL;for purposes of eval  -HF    Precautions/Limitations, Hearing WFL;for purposes of eval  -HF    Plans/Goals Discussed with patient;family;agreed upon  -HF    Barriers to Rehab none identified  -HF       Pain    Additional Documentation Pain Scale: FACES Pre/Post-Treatment (Group)  -HF       Pain Scale: FACES Pre/Post-Treatment    Pain: FACES Scale, Pretreatment 0-->no hurt  -HF       Comprehension Assessment/Intervention    Comprehension Assessment/Intervention Auditory Comprehension;Reading Comprehension  -HF       Auditory Comprehension Assessment/Intervention    Auditory Comprehension (Communication) WFL  -HF    Able to Identify Objects/Pictures (Communication) familiar objects;pictures of common objects;WFL  -HF    Answers Questions (Communication) yes/no;wh questions;personal;simple;concrete;abstract;WFL  -HF    Able to Follow Commands (Communication) 1-step;2-step;3-step;WFL  -HF    Narrative Discourse simple paragraph level;WFL  -HF    Auditory Comprehension Communication, Comment WFL  -HF       Reading Comprehension Assessment/Intervention    Reading Comprehension (Communication) WFL  -HF    Reading Comprehension, Comment WFL  -HF       Expression Assessment/Intervention    Expression Assessment/Intervention verbal expression  -HF       Verbal Expression  Assessment/Intervention    Verbal Expression WFL  -HF    Automatic Speech (Communication) response to greeting;days of week;WFL  -HF    Repetition words;phrases;sentences;WFL  -HF    Phrase Completion automatic/predictable;WFL  -HF    Responsive Naming simple;complex;WFL  -HF    Confrontational Naming WFL  -HF    Spontaneous/Functional Words WFL  -HF    Sentence Formulation WFL  -HF    Conversational Discourse/Fluency WFL  -HF    Verbal Expression, Comment WFL. Pt demonstrates adequate expressive and receptive language abilities based on informal assessment. No instances of anomia or paraphasias. Pt follows all commands and answers all questions appropriately. No skilled ST warrated as pt is able to adequately communicate wants and needs.  -HF       Oral Musculature and Cranial Nerve Assessment    Oral Motor General Assessment WFL  -HF       Motor Speech Assessment/Intervention    Speech intelligibility 100%  -HF       Cognitive Assessment Intervention- SLP    Cognitive Function (Cognition) WFL  -HF    Orientation Status (Cognition) person;place;time;situation;WFL  -HF    Memory (Cognitive) short-term;mild impairment;delayed;WFL  -HF    Attention (Cognitive) sustained;alternating;WFL  -HF    Thought Organization (Cognitive) concrete divergent;concrete convergent;WFL  -HF    Reasoning (Cognitive) analogies;deductive;WFL  -HF    Problem Solving (Cognitive) simple;WFL  -HF    Functional Math (Cognitive) simple;WFL;complex;mild impairment  -HF    Cognition, Comment Overall WFL. Immediate memory: 3/5 indep, 5/5 given semantic cues. Delayed recall 5/5 indep. Simple problem solving 100% indep, more complex calculations mildly impaired and improves with min verbal cues. Attention, thought organization, reasoning, and problem solving all WFL. Pt reports he is at his baseline, spouse endorses this.  -HF       SLP Evaluation Clinical Impressions    SLC Criteria for Skilled Therapy Interventions Met no problems identified which  require skilled intervention  -HF    Functional Impact no impact on function  -HF       SLP Treatment Clinical Impressions    Daily Summary of Progress (SLP) progress toward functional goals is good  -HF       Recommendations    Therapy Frequency (SLP SLC) evaluation only  -HF    Anticipated Discharge Disposition (SLP) No further SLP services warranted  -HF              User Key  (r) = Recorded By, (t) = Taken By, (c) = Cosigned By      Initials Name Effective Dates    HF Deneen Galindo SLP 08/01/23 -                        EDUCATION  The patient has been educated in the following areas:     Communication Impairment.                      Time Calculation:      Time Calculation- SLP       Row Name 09/30/23 1208       Time Calculation- SLP    SLP Start Time 1100  -HF    SLP Received On 09/30/23  -HF       Untimed Charges    SLP Eval/Re-eval  ST Eval Speech and Production w/ Language - 23883  -HF              User Key  (r) = Recorded By, (t) = Taken By, (c) = Cosigned By      Initials Name Provider Type    HF Deneen Galindo SLP Speech and Language Pathologist                    Therapy Charges for Today       Code Description Service Date Service Provider Modifiers Qty    24488931867 HC ST EVAL SPEECH AND PROD W LANG  3 9/30/2023 Deneen Galindo SLP GN 1                       LUIS Branch  9/30/2023

## 2023-09-30 NOTE — THERAPY EVALUATION
Patient Name: Abel Tafoya  : 1955    MRN: [id number]                              Today's Date: 2023       Admit Date: 2023    Visit Dx: No diagnosis found.  Patient Active Problem List   Diagnosis    Encounter for screening    Welcome to Medicare preventive visit    History of colonic polyps    Hyperlipidemia    Prostate nodule    Flu vaccine need    Weakness    Vitamin D deficiency    History of elevated PSA    Secondary hypertension    Need for hepatitis C screening test    Class 1 obesity with serious comorbidity and body mass index (BMI) of 30.0 to 30.9 in adult    Psoriasis    Right sided cerebral hemisphere cerebrovascular accident (CVA)     Past Medical History:   Diagnosis Date    Allergic     Broken neck     Colon tumor     Hyperlipidemia     Hypertension      Past Surgical History:   Procedure Laterality Date    APPENDECTOMY      COLON BIOPSY      NECK SURGERY      broken neck    SKIN GRAFT      TUMOR EXCISION      colon      General Information       Row Name 23 1551          Physical Therapy Time and Intention    Document Type evaluation;discharge evaluation/summary  -EJ     Mode of Treatment physical therapy  -EJ       Row Name 23 1551          General Information    Patient Profile Reviewed yes  -EJ     Prior Level of Function independent:;all household mobility;community mobility;ADL's  -EJ     Existing Precautions/Restrictions no known precautions/restrictions  -EJ     Barriers to Rehab none identified  -EJ       Row Name 23 1551          Living Environment    People in Home spouse  -EJ       Row Name 23 1551          Cognition    Orientation Status (Cognition) oriented x 4  -EJ               User Key  (r) = Recorded By, (t) = Taken By, (c) = Cosigned By      Initials Name Provider Type    EJ Earlene Roger, PT Physical Therapist                   Mobility       Row Name 23 1551          Bed Mobility    Bed Mobility bed mobility (all)  activities  -EJ     All Activities, [place] (Bed Mobility) independent  -       Row Name 09/30/23 1551          Sit-Stand Transfer    Sit-Stand [place] (Transfers) independent  -       Row Name 09/30/23 1551          Gait/Stairs (Locomotion)    [place] Level (Gait) independent  -EJ     Distance in Feet (Gait) 200  -EJ     Comment, (Gait/Stairs) no unsteadiness noted  -EJ               User Key  (r) = Recorded By, (t) = Taken By, (c) = Cosigned By      Initials Name Provider Type    Earlene Melissa, PT Physical Therapist                   Obj/Interventions       Row Name 09/30/23 1551          Range of Motion Comprehensive    General Range of Motion no range of motion deficits identified  -[facility] Name 09/30/23 1551          Strength Comprehensive (MMT)    General Manual Muscle Testing (MMT) Assessment no strength deficits identified  -               User Key  (r) = Recorded By, (t) = Taken By, (c) = Cosigned By      Initials Name Provider Type    Earlene Melissa, PT Physical Therapist                   Goals/Plan    No documentation.                  Clinical Impression       [facility] Name 09/30/23 1551          Pain    Pretreatment Pain Rating 0/10 - no pain  -[facility] Name 09/30/23 1551          Plan of Care Review    Plan of Care Reviewed With patient;spouse  -     Progress improving  -     Outcome Evaluation Pt seen for PT this afternoon. He was admitted w acute R MCA. Pt s/p TNK and thrombectomy. At baseline, pt was independent w all mobility and ADLs and lives at home w his wife. Today, pt reports he feels he is back to his baseline. His strength is good and symmetrical. He was independent w all mobility today. He was able to ambulate approx 200 ft w no unsteadiness noted. He is hopeful to return home upon DC soon. No further acute PT needs at this time. Will sign off.  -       Row Name 09/30/23 1551          Therapy Assessment/Plan (PT)    Criteria for Skilled  Interventions Met (PT) no problems identified which require skilled intervention  -EJ     Therapy Frequency (PT) evaluation only  -EJ       Row Name 09/30/23 1551          Positioning and Restraints    Pre-Treatment Position in bed  -EJ     Post Treatment Position bed  -EJ     In Bed notified nsg;supine;call light within reach;encouraged to call for assist;with family/caregiver  -EJ               User Key  (r) = Recorded By, (t) = Taken By, (c) = Cosigned By      Initials Name Provider Type    Earlene Melissa, PT Physical Therapist                   Outcome Measures       Row Name 09/30/23 1554 09/30/23 0800       How much help from another person do you currently need...    Turning from your back to your side while in flat bed without using bedrails? 4  -EJ 4  -BL    Moving from lying on back to sitting on the side of a flat bed without bedrails? 4  -EJ 4  -BL    Moving to and from a bed to a chair (including a wheelchair)? 4  -EJ 4  -BL    Standing up from a chair using your arms (e.g., wheelchair, bedside chair)? 4  -EJ 3  -BL    Climbing 3-5 steps with a railing? 4  -EJ 3  -BL    To walk in hospital room? 4  -EJ 4  -BL    AM-PAC 6 Clicks Score (PT) 24  -EJ 22  -BL    Highest level of mobility 8 --> Walked 250 feet or more  -EJ 7 --> Walked 25 feet or more  -BL      Row Name 09/30/23 1319          Functional Assessment    Outcome Measure Options AM-PAC 6 Clicks Daily Activity (OT)  -               User Key  (r) = Recorded By, (t) = Taken By, (c) = Cosigned By      Initials Name Provider Type    Venice Anaya, OTR Occupational Therapist    Earlene Melissa, PT Physical Therapist    Vaughn Griffin, RN Registered Nurse                                   PT Recommendation and Plan     Plan of Care Reviewed With: patient, spouse  Progress: improving  Outcome Evaluation: Pt seen for PT this afternoon. He was admitted w acute R MCA. Pt s/p TNK and thrombectomy. At baseline, pt was independent w all  mobility and ADLs and lives at home w his wife. Today, pt reports he feels he is back to his baseline. His strength is good and symmetrical. He was independent w all mobility today. He was able to ambulate approx 200 ft w no unsteadiness noted. He is hopeful to return home upon DC soon. No further acute PT needs at this time. Will sign off.     Time Calculation:         PT Charges       Row Name 09/30/23 1554             Time Calculation    Start Time 1541  -EJ      Stop Time 1551  -EJ      Time Calculation (min) 10 min  -EJ      PT Received On 09/30/23  -EJ                User Key  (r) = Recorded By, (t) = Taken By, (c) = Cosigned By      Initials Name Provider Type    Earlene Melissa, PT Physical Therapist                  Therapy Charges for Today       Code Description Service Date Service Provider Modifiers Qty    35968183995 HC PT EVAL MOD COMPLEXITY 3 9/30/2023 Earlene Roger, PT GP 1            PT G-Codes  Outcome Measure Options: AM-PAC 6 Clicks Daily Activity (OT)  AM-PAC 6 Clicks Score (PT): 24  AM-PAC 6 Clicks Score (OT): 19  PT Discharge Summary  Anticipated Discharge Disposition (PT): home with assist    Earlene Roger PT  9/30/2023

## 2023-10-01 ENCOUNTER — READMISSION MANAGEMENT (OUTPATIENT)
Dept: CALL CENTER | Facility: HOSPITAL | Age: 68
End: 2023-10-01
Payer: MEDICARE

## 2023-10-01 VITALS
OXYGEN SATURATION: 95 % | RESPIRATION RATE: 18 BRPM | BODY MASS INDEX: 28.62 KG/M2 | TEMPERATURE: 97.3 F | WEIGHT: 178.1 LBS | HEART RATE: 82 BPM | HEIGHT: 66 IN | DIASTOLIC BLOOD PRESSURE: 71 MMHG | SYSTOLIC BLOOD PRESSURE: 122 MMHG

## 2023-10-01 PROBLEM — I65.21 ICAO (INTERNAL CAROTID ARTERY OCCLUSION), RIGHT: Status: ACTIVE | Noted: 2023-10-01

## 2023-10-01 PROBLEM — N40.1 BPH WITH OBSTRUCTION/LOWER URINARY TRACT SYMPTOMS: Status: ACTIVE | Noted: 2023-10-01

## 2023-10-01 PROBLEM — I63.511 ACUTE ISCHEMIC RIGHT MCA STROKE: Status: ACTIVE | Noted: 2023-10-01

## 2023-10-01 PROBLEM — N13.8 BPH WITH OBSTRUCTION/LOWER URINARY TRACT SYMPTOMS: Status: ACTIVE | Noted: 2023-10-01

## 2023-10-01 PROBLEM — I10 ESSENTIAL HYPERTENSION: Status: ACTIVE | Noted: 2023-10-01

## 2023-10-01 LAB
ALBUMIN SERPL-MCNC: 3.9 G/DL (ref 3.5–5.2)
ANION GAP SERPL CALCULATED.3IONS-SCNC: 13.9 MMOL/L (ref 5–15)
BUN SERPL-MCNC: 19 MG/DL (ref 8–23)
BUN/CREAT SERPL: 17.9 (ref 7–25)
CALCIUM SPEC-SCNC: 8.8 MG/DL (ref 8.6–10.5)
CHLORIDE SERPL-SCNC: 104 MMOL/L (ref 98–107)
CO2 SERPL-SCNC: 21.1 MMOL/L (ref 22–29)
CREAT SERPL-MCNC: 1.06 MG/DL (ref 0.76–1.27)
DEPRECATED RDW RBC AUTO: 45.3 FL (ref 37–54)
EGFRCR SERPLBLD CKD-EPI 2021: 77.4 ML/MIN/1.73
ERYTHROCYTE [DISTWIDTH] IN BLOOD BY AUTOMATED COUNT: 13.1 % (ref 12.3–15.4)
GLUCOSE SERPL-MCNC: 121 MG/DL (ref 65–99)
HCT VFR BLD AUTO: 37.7 % (ref 37.5–51)
HGB BLD-MCNC: 13 G/DL (ref 13–17.7)
MCH RBC QN AUTO: 32.6 PG (ref 26.6–33)
MCHC RBC AUTO-ENTMCNC: 34.5 G/DL (ref 31.5–35.7)
MCV RBC AUTO: 94.5 FL (ref 79–97)
PHOSPHATE SERPL-MCNC: 2.5 MG/DL (ref 2.5–4.5)
PLATELET # BLD AUTO: 310 10*3/MM3 (ref 140–450)
PMV BLD AUTO: 9.7 FL (ref 6–12)
POTASSIUM SERPL-SCNC: 3.7 MMOL/L (ref 3.5–5.2)
RBC # BLD AUTO: 3.99 10*6/MM3 (ref 4.14–5.8)
SODIUM SERPL-SCNC: 139 MMOL/L (ref 136–145)
WBC NRBC COR # BLD: 12.3 10*3/MM3 (ref 3.4–10.8)

## 2023-10-01 PROCEDURE — 99232 SBSQ HOSP IP/OBS MODERATE 35: CPT | Performed by: PSYCHIATRY & NEUROLOGY

## 2023-10-01 PROCEDURE — 85027 COMPLETE CBC AUTOMATED: CPT | Performed by: INTERNAL MEDICINE

## 2023-10-01 PROCEDURE — 80069 RENAL FUNCTION PANEL: CPT | Performed by: INTERNAL MEDICINE

## 2023-10-01 RX ORDER — ASPIRIN 81 MG/1
81 TABLET, CHEWABLE ORAL DAILY
Qty: 30 TABLET | Refills: 2 | Status: SHIPPED | OUTPATIENT
Start: 2023-10-02 | End: 2023-12-31

## 2023-10-01 RX ORDER — ATORVASTATIN CALCIUM 80 MG/1
80 TABLET, FILM COATED ORAL DAILY
Qty: 30 TABLET | Refills: 2 | Status: SHIPPED | OUTPATIENT
Start: 2023-10-02 | End: 2023-12-31

## 2023-10-01 RX ORDER — TAMSULOSIN HYDROCHLORIDE 0.4 MG/1
0.4 CAPSULE ORAL 2 TIMES DAILY
Status: DISCONTINUED | OUTPATIENT
Start: 2023-10-01 | End: 2023-10-01 | Stop reason: HOSPADM

## 2023-10-01 RX ORDER — TAMSULOSIN HYDROCHLORIDE 0.4 MG/1
1 CAPSULE ORAL DAILY
Qty: 30 CAPSULE | Refills: 11 | Status: SHIPPED | OUTPATIENT
Start: 2023-10-01

## 2023-10-01 RX ADMIN — AMLODIPINE BESYLATE 5 MG: 5 TABLET ORAL at 09:00

## 2023-10-01 RX ADMIN — ATORVASTATIN CALCIUM 80 MG: 80 TABLET, FILM COATED ORAL at 09:00

## 2023-10-01 RX ADMIN — SENNOSIDES AND DOCUSATE SODIUM 2 TABLET: 50; 8.6 TABLET ORAL at 09:00

## 2023-10-01 RX ADMIN — ASPIRIN 81 MG: 81 TABLET, CHEWABLE ORAL at 09:00

## 2023-10-01 RX ADMIN — TAMSULOSIN HYDROCHLORIDE 0.4 MG: 0.4 CAPSULE ORAL at 12:54

## 2023-10-01 RX ADMIN — TICAGRELOR 90 MG: 90 TABLET ORAL at 09:00

## 2023-10-01 NOTE — DISCHARGE SUMMARY
Date of Admission: 9/28/2023  Date of Discharge:  10/1/2023    Discharge Diagnosis:    Right MCA syndrome with right ICA stenosis  Status post right carotid stent  Hypertension  Likely BPH with urinary retention    Hospital Course    Presenting Problem/History of Present Illness    Patient is a 68 y.o. male.  Who was transferred to this hospital from [facility] after presenting there with acute stroke the stroke service excepted the patient and were asked to admit he went straight to the interventional unit on arrival for cerebral angiogram they found a 90% internal carotid artery stenosis and had cast placement with no residual stenosis with an Emblon embolus that resolved with TNKase.  Patient was at work he was taking a picture of something with his phone and he just slid down the wall he just could not stand up coworkers could not get him up.  He had no true loss of consciousness no bowel or bladder incontinence no seizure activity apparently he has had no history of prior strokes he has hypertension and hyperlipidemia for which she takes treatment and has been pretty well controlled he is a prediabetic.  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  He is a never smoker he does not drink or use illicit drugs.  No history of heart kidney liver disease no lung disease he is not have any chest pain or shortness of breath no melena hematochezia no dysuria hematuria no easy bleeding or bruising problems.  He has a little sore throat right now they just extubated him a short time ago post procedure.  No headache no nausea no acute visual changes    Subsequent Course of Management    Patient was admitted with acute left-sided weakness.  Given TNKase.  Found to have right ICA stenosis and stent placed.  Left-sided weakness improved significantly by time of discharge.  Neurology followed patient for the next several days.  Recommend discharge on Brilinta and aspirin as well as high-dose statin.  Patient should be off  "work for 2 weeks.  Patient did have urinary retention requiring placement of Ybarra catheter.  Urology has seen and have asked for discharge with Ybarra catheter with follow-up in their office this week.  I have clarified that patient will afford the Brilinta medication prescribed.  He does not wish to stay in the hospital for extra days to transition to Plavix.    Examination on Date of Discharge    /71 (BP Location: Left arm, Patient Position: Lying)   Pulse 82   Temp 97.3 °F (36.3 °C) (Oral)   Resp 18   Ht 167 cm (65.75\")   Wt 80.8 kg (178 lb 1.6 oz)   SpO2 95%   BMI 28.97 kg/m²     Constitutional:       Appearance: He is obese.   Eyes:      Pupils: Pupils are equal, round, and reactive to light.   Cardiovascular:      Rate and Rhythm: Normal rate and regular rhythm.      Heart sounds: No murmur heard.  Pulmonary:      Effort: Pulmonary effort is normal.      Breath sounds: Normal breath sounds.   Abdominal:      General: Bowel sounds are normal.      Palpations: Abdomen is soft. There is no mass.      Tenderness: There is no abdominal tenderness.   Musculoskeletal:         General: No swelling.   Neurological:      Mental Status: He is alert. No deficit.    Test Results    Surgical Procedures Since Admission:       Results for orders placed during the hospital encounter of 09/28/23    Adult Transthoracic Echo Complete w/ Color, Spectral and Contrast if Necessary Per Protocol    Interpretation Summary    Left ventricular systolic function is normal. Calculated left ventricular EF = 50.9%    Left ventricular diastolic function was normal.    There is moderate calcification of the aortic valve.    Saline test results are negative.            Results from last 7 days   Lab Units 10/01/23  0538 09/29/23  0311 09/28/23  1403   WBC 10*3/mm3 12.30* 11.10* 10.20   HEMOGLOBIN g/dL 13.0 13.4 14.6   HEMATOCRIT % 37.7 39.6 43.2   PLATELETS 10*3/mm3 310 312 359       Results from last 7 days   Lab Units " 10/01/23  0538 09/29/23  0311 09/28/23  1403   SODIUM mmol/L 139 138 140   POTASSIUM mmol/L 3.7 4.8 3.6   CHLORIDE mmol/L 104 107 102   CO2 mmol/L 21.1* 19.3* 24.0   BUN mg/dL 19 14 16   CREATININE mg/dL 1.06 0.93 1.16       Results from last 7 days   Lab Units 09/28/23  1403   HSTROP T ng/L 10       Microbiology Results (last 10 days)       ** No results found for the last 240 hours. **            Adult Transthoracic Echo Complete w/ Color, Spectral and Contrast if Necessary Per Protocol    Result Date: 9/30/2023    Left ventricular systolic function is normal. Calculated left ventricular EF = 50.9%   Left ventricular diastolic function was normal.   There is moderate calcification of the aortic valve.   Saline test results are negative.     CT Head Without Contrast    Result Date: 9/29/2023  CT HEAD WITHOUT CONTRAST  CLINICAL HISTORY: Follow-up stroke.  TECHNIQUE: CT scan of the head was obtained with 3 mm axial soft tissue algorithm images. No intravenous contrast was administered. Sagittal and coronal reconstructions were obtained.  COMPARISON: CT scan of the head dated 09/28/2023.  FINDINGS:   In the interval since a prior outside CT scan of the head dated 09/28/2023, the patient has developed an area of abnormal hypodensity within the lateral aspect of the right frontal lobe measuring up to 15 x 12 mm in greatest axial dimensions that is compatible with an area of acute completed infarction within the right MCA distribution. There is no evidence for hemorrhagic transformation. No significant mass effect is seen as a result of this infarct.  Otherwise, the ventricles, sulci, and cisterns are age-appropriate. The basal ganglia and thalami are unremarkable in appearance. The posterior fossa structures are within normal limits.  Incidental note is made of a small mucous retention cyst within the left maxillary sinus. Postsurgical changes are incidentally appreciated at the level of the posterior arch of C1.        In the interval since the prior examination, the patient has developed a small area of acute completed infarction within the lateral aspect of the right frontal lobe within the right MCA distribution. This measures up to 15 x 12 mm in greatest axial dimensions and there is no evidence for hemorrhagic transformation at this site. There is no significant degree of mass effect as a result of this infarct.   Radiation dose reduction techniques were utilized, including automated exposure control and exposure modulation based on body size.  This report was finalized on 9/29/2023 12:08 PM by Dr. Corona Calvillo M.D.      CT Angiogram Neck    Result Date: 9/28/2023  CT ANGIOGRAM HEAD W AI ANALYSIS OF LVO, CT ANGIOGRAM NECK Date of Exam: 9/28/2023 2:23 PM EDT Indication: Stroke, follow up Neuro deficit, acute stroke suspected Acute Stroke. Comparison: None available. Technique: CTA of the head and neck was performed after the uneventful intravenous administration of iodinated contrast. Reconstructed coronal and sagittal images were also obtained. In addition, a 3-D volume rendered image was created for interpretation. Automated exposure control and iterative reconstruction methods were used. Findings: The lung apices are clear. Evaluation of the neck soft tissues demonstrates no pathologic cervical adenopathy or unexpected aerodigestive tract mass. The osseous structures demonstrate no evidence of acute fracture or aggressive osseous lesion with spondylosis change and prior cerclage wire fusion of C1-C3 noted posteriorly. Patent aortic arch with three-vessel branching. The visualized subclavian arteries are patent bilaterally. The right ICA is completely occluded through the intracranial segment. The left ICA demonstrates mild calcific atherosclerosis, with less than 50% narrowing by NASCET criteria. The vertebral arteries are normal in course and caliber bilaterally. Intracranially, there is occlusion of the right ICA, with  some faint reconstitution of the distal cavernous segment, likely via patent anterior communicating and posterior communicating arteries. The right middle cerebral artery demonstrates some focal mild to moderate narrowing proximally, with subsequent complete occlusion. The anterior temporal M2 branch reconstitutes and there is some likely collateral filling of additional faint sylvian M2 branches. The left middle cerebral artery demonstrates some mild to moderate narrowing of the proximal anterior temporal M2 branch otherwise without evidence of large vessel occlusion or aneurysm. The vertebrobasilar system is patent. The posterior cerebral arteries demonstrate moderate narrowing of the right P1 segment, otherwise patent.     Impression: Complete occlusion of the right ICA from its origin through the distal cavernous intracranial segment, with likely retrograde reconstitution. There is tandem subsequent complete occlusion of the right MCA M1 segment, with reconstitution of the anterior temporal branch and overall decreased arborization of M2 and M3 vessels through the right MCA territory, corresponding with ischemic tissue at risk on concurrently performed CT perfusion. Findings relayed to Dr. López of the emergency department by Ian Evans via the epic messaging system at 2:43 p.m. 9/28/2023. Electronically Signed: Alexi Evans MD  9/28/2023 2:43 PM EDT  Workstation ID: [id number]    MRI Brain Without Contrast    Result Date: 9/30/2023  MRI OF THE BRAIN WITHOUT CONTRAST  CLINICAL HISTORY: Status post M1 thrombectomy 9/28/2023  TECHNIQUE: MRI of the brain was obtained with sagittal T1, axial T1, axial FLAIR, axial T2, axial diffusion, and axial gradient echo images.  COMPARISON: CT head neck angiogram 9/28/2023  FINDINGS: Dominant 1.7 cm focus of diffusion restriction with corresponding T2/FLAIR hyperintensity at the posterior right middle frontal gyrus (series 4 image 18). Few additional punctate foci of  diffusion restriction with corresponding T2/FLAIR hyperintensity in the adjacent posterior right right middle frontal gyrus. Isolated punctate focus of diffusion restriction with corresponding T2/FLAIR hyperintensity in the posterior right parietal lobe (series 4 image 18). No associated GRE signal hypointensity within these areas. No significant mass effect or herniation.  Few scattered small foci of T2/FLAIR hyperintensity in the bilateral white matter without corresponding diffusion restriction.  No hydrocephalus or extra-axial fluid collections.  Preserved flow voids.  Extracranial visualized structures are within normal limits.        Few early subacute infarcts centered at the posterior right middle frontal gyrus. Solitary punctate early subacute infarct at the posterior right parietal lobe. No hemorrhagic transformation.    This report was finalized on 9/30/2023 6:40 PM by Dr. Khoi Rutherford M.D.      XR Chest 1 View    Result Date: 9/28/2023  XR CHEST 1 VW Date of Exam: 9/28/2023 2:40 PM EDT Indication: Acute Stroke Protocol (onset < 12 hrs) Comparison: PA and lateral chest 5/26/2021 Findings: No acute airspace disease. Heart size is within normal limits. No pleural effusion or pneumothorax or acute osseous abnormality. Surgical changes are seen over the upper neck. Degenerative endplate spurring is present in the spine.     Impression: No acute cardiopulmonary findings. Electronically Signed: Melody Becerra MD  9/28/2023 2:59 PM EDT  Workstation ID: [id number]    IR Perc [facility] Thromb Prim Nonc Art Ini    Result Date: 9/29/2023  CEREBRAL ARTERIOGRAM WITH RIGHT ICA ORIGIN STENT PLACEMENT UNDER DISTAL PROTECTION performed on 9/28/2023.  CLINICAL HISTORY: 68-year-old male with history of hypertension and hyperlipidemia now having acute onset of left hemiparesis, dysarthric speech plus left facial droop and found down this morning. Stroke imaging work-up demonstrated a distal ICA occlusion extending into the M1  segment with a significant penumbra on CTP. The patient is status post IV TNK and now transferred for endovascular treatment.  TECHNIQUE: After obtaining emergency consent, the patient was placed in a supine position on the angiographic table and the right groin was then prepped and draped in usual sterile fashion with ChloraPrep soap. Under ultrasound guidance with permanent images recorded, a 4 [place] citizen micro-puncture set was used to access the right common femoral artery through a dermatotomy. Through this access an 8 [place] citizen sheath was positioned within the right groin. Over an 035 Glidewire, a 6 [place] citizen vertebral artery catheter was placed coaxially into a Park.comom 88 support guiding sheath and together these were placed into the circulation and the following vessels were then selectively catheterized:  1. Right common carotid artery with digital subtraction imaging of the cervical and intracranial runoff after contrast injection.  Upon completion of the diagnostic portion of the exam, the endovascular procedure was performed as described below. At the conclusion of the entire procedure, the catheters and sheath were removed with placement of an 8 [place] citizen Angio-Seal. Once hemostasis was achieved the appropriate dressing was applied. No immediate postprocedural complications were encountered. General endotracheal anesthesia was provided by the anesthesia department. Approximately 27.8 minutes of fluoroscopy time were employed delivering an AK of 1441.02 mGy. 155 cc of Visipaque 320 were injected. Maximal sterile barrier technique was employed throughout the entire procedure according to current guidelines.  Surgeon: Dr. Grady Flannery.  FINDINGS: The right common carotid artery injection demonstrates a patent bifurcation occurring at the C3-4 level, but a high-grade stenosis by NASCET criteria is present measuring 87% with slow runoff seen. The intracranial vasculature shows a patent M1 segment with no appreciable A1  segment. The MCA runoff is patent with no distal occlusion identified. No aneurysms, hypervascular lesions or evidence of AV shunting is appreciated.  ENDOVASCULAR PROCEDURE:  Right ICA Origin Carotid Stent Placement under Distal Protection - Upon completion of the diagnostic portion of the exam and with the Zoom 88 support guiding sheath in the cervical right common carotid artery, a Traxcess-14 microguidewire was placed through the guiding sheath into the cervical right ICA after navigating the tight stenosis under roadmap conditions. A SpiderFX 5 mm distal protection device was then placed over the guidewire and once in position the Traxcess-14 was removed and the distal protection device wire became the working wire. Predilatation was then achieved with a 4 x 20 mm Viatrack 14+ angioplasty balloon. A 6 x 30 mm Cordis Precise Pro Rx stent was then deployed. Post dilatation was required secondary to a residual waist within the midportion of the stent and this was achieved with a 6 x 20 mm Viatrack 14+ angioplasty balloon. No residual waist or stenosis was seen. Marked improved flow intracranially is present. Patient received IV Integrilin with the bolus dose followed by maintenance drip. Dual antiplatelet therapy will be required for 3 - 6 months with follow-up carotid Doppler ultrasound at 6 months.      Acute occlusion of the right internal carotid artery and M1 segment with subsequent successful recanalization after IV TNK, but an underlying 87% stenosis of the right ICA origin present. Successful carotid artery stent placement under distal protection performed with no residual narrowing seen. Initiation of dual antiplatelet therapy required with follow-up carotid Doppler sonography as described above.  All carotid stenosis measurements were made using NASCET criteria.   This report was finalized on 9/29/2023 11:13 AM by Dr. Grady Flannery M.D.      CT Head Without Contrast Stroke Protocol    Result Date:  9/28/2023  CT HEAD WO CONTRAST STROKE PROTOCOL Date of Exam: 9/28/2023 2:07 PM EDT Indication: Neuro deficit, acute stroke suspected Neuro deficit, acute, stroke suspected. Collapsed at work. Slurred speech. Comparison: None Technique: Axial CT images were obtained of the head without contrast administration.  Reconstructed coronal and sagittal images were also obtained. Automated exposure control and iterative construction methods were used. Scan Time: 9/28/2023 at 1407 Results discussed with Dr. López in the emergency room at 9/28/2023 at 1407. Findings: Some of the images are degraded by motion. Repeated attempts at imaging were made. Preservation of the gray matter-white matter junction distinction. There is no CT evidence of acute or evolving large territory infarct. Mild deep white matter hypodensities are nonspecific but favored to represent changes of chronic microvascular disease. No mass lesion or mass effect or midline shift is seen. No intra evident. Surgical changes are seen within the posterior C1 vertebrae. No acute calvarial abnormality. Mild left maxillary sinus mucous retention cyst or polyps. Mastoid air cells are clear. No acute calvarial abnormality.     Impression: 1. No acute intracranial finding. 2. Features of mild chronic microvascular disease. Electronically Signed: Melody Becerra MD  9/28/2023 2:15 PM EDT  Workstation ID: [id number]    CT Angiogram Head w AI Analysis of LVO    Result Date: 9/28/2023  CT ANGIOGRAM HEAD W AI ANALYSIS OF LVO, CT ANGIOGRAM NECK Date of Exam: 9/28/2023 2:23 PM EDT Indication: Stroke, follow up Neuro deficit, acute stroke suspected Acute Stroke. Comparison: None available. Technique: CTA of the head and neck was performed after the uneventful intravenous administration of iodinated contrast. Reconstructed coronal and sagittal images were also obtained. In addition, a 3-D volume rendered image was created for interpretation. Automated exposure control and  iterative reconstruction methods were used. Findings: The lung apices are clear. Evaluation of the neck soft tissues demonstrates no pathologic cervical adenopathy or unexpected aerodigestive tract mass. The osseous structures demonstrate no evidence of acute fracture or aggressive osseous lesion with spondylosis change and prior cerclage wire fusion of C1-C3 noted posteriorly. Patent aortic arch with three-vessel branching. The visualized subclavian arteries are patent bilaterally. The right ICA is completely occluded through the intracranial segment. The left ICA demonstrates mild calcific atherosclerosis, with less than 50% narrowing by NASCET criteria. The vertebral arteries are normal in course and caliber bilaterally. Intracranially, there is occlusion of the right ICA, with some faint reconstitution of the distal cavernous segment, likely via patent anterior communicating and posterior communicating arteries. The right middle cerebral artery demonstrates some focal mild to moderate narrowing proximally, with subsequent complete occlusion. The anterior temporal M2 branch reconstitutes and there is some likely collateral filling of additional faint sylvian M2 branches. The left middle cerebral artery demonstrates some mild to moderate narrowing of the proximal anterior temporal M2 branch otherwise without evidence of large vessel occlusion or aneurysm. The vertebrobasilar system is patent. The posterior cerebral arteries demonstrate moderate narrowing of the right P1 segment, otherwise patent.     Impression: Complete occlusion of the right ICA from its origin through the distal cavernous intracranial segment, with likely retrograde reconstitution. There is tandem subsequent complete occlusion of the right MCA M1 segment, with reconstitution of the anterior temporal branch and overall decreased arborization of M2 and M3 vessels through the right MCA territory, corresponding with ischemic tissue at risk on  concurrently performed CT perfusion. Findings relayed to Dr. López of the emergency department by Ian Evans via the epic messaging system at 2:43 p.m. 9/28/2023. Electronically Signed: Alexi Evans MD  9/28/2023 2:43 PM EDT  Workstation ID: [id number]    CT CEREBRAL PERFUSION WITH & WITHOUT CONTRAST    Result Date: 9/28/2023  CT CEREBRAL PERFUSION W WO CONTRAST Date of Exam: 9/28/2023 2:22 PM EDT Indication: Neuro deficit, acute, stroke suspected.  Comparison: CT head without contrast 9/28/2023. Technique: Axial CT images of the brain were obtained prior to and after the administration of iodinated contrast. CT Perfusion protocol was utilized. Automated post processing was performed by RAPID software and submitted to PACS for interpretation. Automated exposure control and iterative reconstruction was utilized. Findings: On source images, large perfusion defect is demonstrated within the right frontal, parietal and temporal lobes consistent with large territory right MCA ischemia/infarct. Cerebral blood flow less than 30%: 12 mL Tmax greater than 6 seconds: 160 mL Mismatch volume: 148 mL Mismatch ratio: 13 mL     Impression: Findings consistent with large right MCA hypoperfusion and evolving infarct. The ischemic core is approximately 12 cc volume. Large ischemic penumbra is suggested. I have already discussed the major pertinent findings with neurology team prior to the time of this dictation. Electronically Signed: Melody Becerra MD  9/28/2023 2:38 PM EDT  Workstation ID: [id number]      Consulting Physician(s)       Provider Relationship Specialty    Ge Steward MD Consulting Physician Neurology    Grady Aguila MD Consulting Physician Urology                   Discharge Instructions      Dietary Orders (From admission, onward)       Start     Ordered    09/29/23 0911  Diet: Regular/House Diet; Texture: Regular Texture (IDDSI 7); Fluid Consistency: Thin (IDDSI 0)  Diet Effective Now         References:    Diet Order [facility]   Question Answer Comment   Diets: Regular/House Diet    Texture: Regular Texture (IDDSI 7)    Fluid Consistency: Thin (IDDSI 0)        09/29/23 0910                            Your medication list        START taking these medications        Instructions Last Dose Given Next Dose Due   aspirin 81 MG chewable tablet  Start taking on: October 2, 2023      Chew 1 tablet Daily for 90 days.       atorvastatin 80 MG tablet  Commonly known as: LIPITOR  Start taking on: October 2, 2023      Take 1 tablet by mouth Daily for 90 days.       tamsulosin 0.4 MG capsule 24 hr capsule  Commonly known as: FLOMAX      Take 1 capsule by mouth Daily.       ticagrelor 90 MG tablet tablet  Commonly known as: BRILINTA      Take 1 tablet by mouth 2 (Two) Times a Day for 90 days.              CONTINUE taking these medications        Instructions Last Dose Given Next Dose Due   amLODIPine 5 MG tablet  Commonly known as: NORVASC      TAKE 1 TABLET BY MOUTH DAILY       ascorbic acid 100 MG tablet  Commonly known as: VITAMIN C      SM VITAMIN C TABS       Cholecalciferol 25 MCG (1000 UT) capsule      SM VITAMIN D3 CAPS       fluticasone 50 MCG/ACT nasal spray  Commonly known as: Flonase Allergy Relief      2 sprays into the nostril(s) as directed by provider Daily.       montelukast 10 MG tablet  Commonly known as: SINGULAIR      TAKE 1 TABLET BY MOUTH EVERY NIGHT       mupirocin 2 % ointment  Commonly known as: BACTROBAN      APPLY EXTERNALLY TO THE AFFECTED AREA ON LEGS TWICE DAILY UNTIL HEALED       QC Mens Daily Multivitamin tablet tablet  Generic drug: multivitamin with minerals      QC MENS DAILY MULTIVITAMIN TABS              STOP taking these medications      fish oil 1000 MG capsule capsule        indomethacin 50 MG capsule  Commonly known as: INDOCIN        lovastatin 40 MG tablet  Commonly known as: MEVACOR                  Where to Get Your Medications        These medications were sent to  [facility]  [address] [address]      Hours: Monday to Friday 7 AM to 6 PM, Saturday & Sunday 8 AM to 4:30 PM (Closed 12 PM to 12:30 PM) Phone: [phone]   tamsulosin 0.4 MG capsule 24 hr capsule       These medications were sent to [id number], [place] - [address] - [phone]  - [phone] FX  [address] [facility] [place] [address]      Phone: [phone]   aspirin 81 MG chewable tablet  atorvastatin 80 MG tablet  ticagrelor 90 MG tablet tablet          Follow-up Information       Florentino Santoyo MD Follow up in 3 month(s).    Specialty: Neurology  Contact information:  [address] [facility] [address]  [place] [id number] [phone]               Grady Flannery MD Follow up in 6 month(s).    Specialty: Neurosurgery  Why: with carotid ultrasound  Contact information:  [address] [facility] [address] [address]  [place] [address]  [phone]               Cherelle Vila, APRN .    Specialties: Nurse Practitioner, Family Medicine  Contact information:  [address] [facility] [address]  [place] [address] [phone]                                 Condition on Discharge:  Gwen Tineo MD  10/01/23  16:32 EDT    Time: I spent over 30mins in the discharge planning of this patient.    Some of this encounter note is an electronic transcription/translation of spoken language to printed text.

## 2023-10-01 NOTE — PLAN OF CARE
Goal Outcome Evaluation:           Progress: improving     No neurological changes this shift. NIH remains zero. Ybarra catheter placed. Care und instructions reviewed with patient and wife. All questions answered. Plan to discharge home.  Brilinta coupon given to patient.

## 2023-10-01 NOTE — PROGRESS NOTES
"DOS: 10/1/2023  NAME: Abel Tafoya   : 1955  PCP: Cherelle Vila, SHABBIR  No chief complaint on file.      Chief complaint: Stroke  Subjective: No new neurologic symptoms.  Essentially no residual neurologic deficit.    Objective:  Vital signs: /80 (BP Location: Left arm, Patient Position: Lying)   Pulse 95   Temp 97.7 °F (36.5 °C) (Oral)   Resp 18   Ht 167 cm (65.75\")   Wt 80.8 kg (178 lb 1.6 oz)   SpO2 95%   BMI 28.97 kg/m²    Gen: NAD, vitals reviewed  MS: oriented x3, recent/remote memory intact, normal attention/concentration, language intact, no neglect.  CN: visual acuity grossly normal, PERRL, EOMI, no facial droop, no dysarthria  Motor: 5/5 throughout upper and lower extremities, normal tone  Sensory: intact to light touch all 4 ext.    Laboratory results:  Lab Results   Component Value Date    GLUCOSE 121 (H) 10/01/2023    CALCIUM 8.8 10/01/2023     10/01/2023    K 3.7 10/01/2023    CO2 21.1 (L) 10/01/2023     10/01/2023    BUN 19 10/01/2023    CREATININE 1.06 10/01/2023    EGFRIFNONA 71 2021    BCR 17.9 10/01/2023    ANIONGAP 13.9 10/01/2023     Lab Results   Component Value Date    WBC 12.30 (H) 10/01/2023    HGB 13.0 10/01/2023    HCT 37.7 10/01/2023    MCV 94.5 10/01/2023     10/01/2023     Lab Results   Component Value Date     (H) 2023     (H) 2023     (H) 2022         Lab 233   HEMOGLOBIN A1C 6.10*        Review of labs: , A1c 6.1%    Review and interpretation of imaging: I personally reviewed his MRI of the brain which shows several small watershed infarcts in the right ICA territory.  2D echo showed EF around 50%, negative bubble study    Diagnoses:  Stroke due to right carotid occlusion, atherosclerotic  Status post right carotid stent  Urinary retention    Comment: Doing well with essentially no residual stroke deficits.    Plan:  1.  Continue aspirin, Brilinta  2.  High-dose " statin    Activity:  -2 weeks off work  -Otherwise no restrictions    Follow-up with me in 3 months    Follow-up with Dr. Flannery in 6 months with carotid ultrasound    Management discussed with Dr. Tineo

## 2023-10-01 NOTE — OUTREACH NOTE
Prep Survey      Flowsheet Row Responses   [facility] facility patient discharged from? [place]   Is LACE score < 7 ? No   Eligibility [facility]   Date of Admission 09/28/23   Date of Discharge 10/01/23   Discharge Disposition Home or Self Care   Discharge diagnosis Acute ischemic right MCA stroke   Does the patient have one of the following disease processes/diagnoses(primary or secondary)? Stroke   Does the patient have Home health ordered? No   Is there a DME ordered? No   Medication alerts for this patient see AVS   Prep survey completed? Yes            Corinne MONK - Registered Nurse

## 2023-10-01 NOTE — NURSING NOTE
2230:  Voided 150 cc               cc              I/O Cath 850 cc    0500:  Bladder Scan 915 cc              Voided 200 cc              I/O Cath 700 cc

## 2023-10-01 NOTE — CONSULTS
FIRST UROLOGY CONSULT      Patient Identification:  NAME:  Abel Tafoya  Age:  68 y.o.   Sex:  male   :  1955   MRN:  [id number]     Chief complaint: CVA    History of present illness:      Patient was admitted through ED for CVA.  He is back to baseline and doing very well.  He has a history of incomplete emptying, and now cannot barely void on own.  Nursing staff has been doing CIC as needed.  Patient sees Dr. Aguila at .  He is currently not taking Alpha Blockers.  Residuals 700-900s.  After discussion with patient he feels he cannot CIC and would lynn for home.    In hospital:  -AVSS, good UOP  -WBC - 12  -Creat - 1.06  -UA - NA    -CT -     MRI-Few early subacute infarcts centered at the posterior right  middle frontal gyrus. Solitary punctate early subacute infarct at the  posterior right parietal lobe. No hemorrhagic transformation.     Asked to see    Past medical history:  Past Medical History:   Diagnosis Date    Allergic     Broken neck     Colon tumor     Hyperlipidemia     Hypertension        Past surgical history:  Past Surgical History:   Procedure Laterality Date    APPENDECTOMY      COLON BIOPSY      NECK SURGERY      broken neck    SKIN GRAFT      TUMOR EXCISION      colon       Allergies:  Patient has no known allergies.    Home medications:  Medications Prior to Admission   Medication Sig Dispense Refill Last Dose    amLODIPine (NORVASC) 5 MG tablet TAKE 1 TABLET BY MOUTH DAILY 90 tablet 1     lovastatin (MEVACOR) 40 MG tablet TAKE 2 TABLETS BY MOUTH EVERY NIGHT 180 tablet 1     montelukast (SINGULAIR) 10 MG tablet TAKE 1 TABLET BY MOUTH EVERY NIGHT 90 tablet 1     ascorbic acid (VITAMIN C) 100 MG tablet SM VITAMIN C TABS       Cholecalciferol 1000 units capsule SM VITAMIN D3 CAPS       fluticasone (Flonase Allergy Relief) 50 MCG/ACT nasal spray 2 sprays into the nostril(s) as directed by provider Daily. 1 bottle 3     indomethacin (INDOCIN) 50 MG capsule Take 1 capsule by  mouth 3 (Three) Times a Day As Needed for Mild Pain . 15 capsule 0     Multiple Vitamins-Minerals (QC MENS DAILY MULTIVITAMIN) tablet QC MENS DAILY MULTIVITAMIN TABS       mupirocin (BACTROBAN) 2 % ointment APPLY EXTERNALLY TO THE AFFECTED AREA ON LEGS TWICE DAILY UNTIL HEALED       Omega-3 Fatty Acids (FISH OIL) 1000 MG capsule capsule Take 1 capsule by mouth Daily With Breakfast.           Hospital medications:  amLODIPine, 5 mg, Oral, Daily  aspirin, 81 mg, Oral, Daily  atorvastatin, 80 mg, Oral, Daily  fluticasone, 2 spray, Nasal, Daily  montelukast, 10 mg, Oral, Nightly  senna-docusate sodium, 2 tablet, Oral, BID  sodium chloride, 10 mL, Intravenous, Q12H  ticagrelor, 90 mg, Oral, BID      sodium chloride, 100 mL/hr, Last Rate: 100 mL/hr (23)        acetaminophen **OR** acetaminophen    senna-docusate sodium **AND** polyethylene glycol **AND** bisacodyl **AND** bisacodyl    Calcium Replacement - Follow Nurse / BPA Driven Protocol    indomethacin    labetalol    Magnesium Standard Dose Replacement - Follow Nurse / BPA Driven Protocol    nitroglycerin    ondansetron    Phosphorus Replacement - Follow Nurse / BPA Driven Protocol    Potassium Replacement - Follow Nurse / BPA Driven Protocol    sodium chloride    sodium chloride    Family history:  Family History   Problem Relation Age of Onset    Diabetes Mother     Thyroid disease Mother     Hypertension Mother     Lung cancer Father     Heart disease Father     Hyperlipidemia Brother     Hypertension Brother     Heart disease Brother     Diabetes Brother        Social history:  Social History     Tobacco Use    Smoking status: Never    Smokeless tobacco: Never   Vaping Use    Vaping Use: Never used   Substance Use Topics    Alcohol use: No    Drug use: No       Review of systems:      Positive for:  nothing  Negative for:  chest pain, cough, sob, o/w neg    Objective:  TMax 24 hours:   Temp (24hrs), Av °F (36.7 °C), Min:97.7 °F (36.5 °C), Max:98.4  °F (36.9 °C)      Vitals Ranges:   Temp:  [97.7 °F (36.5 °C)-98.4 °F (36.9 °C)] 97.7 °F (36.5 °C)  Heart Rate:  [64-95] 95  Resp:  [16-18] 18  BP: (117-148)/(61-88) 139/80    Intake/Output Last 3 shifts:  I/O last 3 completed shifts:  In: 1176.7 [P.O.:120; I.V.:1056.7]  Out: 3845 [Urine:3845]     Physical Exam:    General Appearance:    Alert, cooperative, NAD   Back:     No CVA tenderness   Lungs:     Respirations unlabored, no wheezing    Heart:    RRR, intact peripheral pulses   Abdomen:     Soft, NDNT, no masses, no guarding   Neuro/Psych:   Orientation intact, mood/affect pleasant       Results review:   I reviewed the patient's new clinical results.    Data review:  Lab Results (last 24 hours)       Procedure Component Value Units Date/Time    Renal Function Panel [[id number]]  (Abnormal) Collected: 10/01/23 0538    Specimen: Blood Updated: 10/01/23 0644     Glucose 121 mg/dL      BUN 19 mg/dL      Creatinine 1.06 mg/dL      Sodium 139 mmol/L      Potassium 3.7 mmol/L      Chloride 104 mmol/L      CO2 21.1 mmol/L      Calcium 8.8 mg/dL      Albumin 3.9 g/dL      Phosphorus 2.5 mg/dL      Anion Gap 13.9 mmol/L      BUN/Creatinine Ratio 17.9     eGFR 77.4 mL/min/1.73     Narrative:      GFR Normal >60  Chronic Kidney Disease <60  Kidney Failure <15      CBC (No Diff) [[id number]]  (Abnormal) Collected: 10/01/23 0538    Specimen: Blood Updated: 10/01/23 0621     WBC 12.30 10*3/mm3      RBC 3.99 10*6/mm3      Hemoglobin 13.0 g/dL      Hematocrit 37.7 %      MCV 94.5 fL      MCH 32.6 pg      MCHC 34.5 g/dL      RDW 13.1 %      RDW-SD 45.3 fl      MPV 9.7 fL      Platelets 310 10*3/mm3     POC Glucose Once [[id number]]  (Normal) Collected: 09/30/23 1238    Specimen: Blood Updated: 09/30/23 1240     Glucose 118 mg/dL              Imaging:  Imaging Results (Last 24 Hours)       Procedure Component Value Units Date/Time    MRI Brain Without Contrast [[id number]] Collected: 09/30/23 1824     Updated: 09/30/23 184     Narrative:      MRI OF THE BRAIN WITHOUT CONTRAST     CLINICAL HISTORY: Status post M1 thrombectomy 9/28/2023     TECHNIQUE: MRI of the brain was obtained with sagittal T1, axial T1,  axial FLAIR, axial T2, axial diffusion, and axial gradient echo images.     COMPARISON: CT head neck angiogram 9/28/2023     FINDINGS: Dominant 1.7 cm focus of diffusion restriction with  corresponding T2/FLAIR hyperintensity at the posterior right middle  frontal gyrus (series 4 image 18). Few additional punctate foci of  diffusion restriction with corresponding T2/FLAIR hyperintensity in the  adjacent posterior right right middle frontal gyrus. Isolated punctate  focus of diffusion restriction with corresponding T2/FLAIR  hyperintensity in the posterior right parietal lobe (series 4 image 18).  No associated GRE signal hypointensity within these areas. No  significant mass effect or herniation.     Few scattered small foci of T2/FLAIR hyperintensity in the bilateral  white matter without corresponding diffusion restriction.     No hydrocephalus or extra-axial fluid collections.  Preserved flow  voids.  Extracranial visualized structures are within normal limits.            Impression:      Few early subacute infarcts centered at the posterior right  middle frontal gyrus. Solitary punctate early subacute infarct at the  posterior right parietal lobe. No hemorrhagic transformation.           This report was finalized on 9/30/2023 6:40 PM by Dr. Khoi Rutherford M.D.                  Assessment:     Incomplete emptying    Plan:     Patient would like to go home with lynn  Start flomax BID  See Dr. Aguila next week for VT    Milly Kay, SHABBIR  10/01/23  11:27 EDT        CV

## 2023-10-01 NOTE — PLAN OF CARE
Goal Outcome Evaluation:  Plan of Care Reviewed With: patient        Progress: improving  Outcome Evaluation: VSS, NIH 0, no neuro deficits noted, right groin CDI, complaining of acute retention, I/O cath X 2, see note, will continue to monitor

## 2023-10-02 ENCOUNTER — TELEPHONE (OUTPATIENT)
Dept: NEUROLOGY | Facility: CLINIC | Age: 68
End: 2023-10-02

## 2023-10-02 ENCOUNTER — TELEPHONE (OUTPATIENT)
Dept: NEUROSURGERY | Facility: CLINIC | Age: 68
End: 2023-10-02
Payer: MEDICARE

## 2023-10-02 ENCOUNTER — TRANSITIONAL CARE MANAGEMENT TELEPHONE ENCOUNTER (OUTPATIENT)
Dept: CALL CENTER | Facility: HOSPITAL | Age: 68
End: 2023-10-02
Payer: MEDICARE

## 2023-10-02 DIAGNOSIS — I65.21 ICAO (INTERNAL CAROTID ARTERY OCCLUSION), RIGHT: Primary | ICD-10-CM

## 2023-10-02 NOTE — CASE MANAGEMENT/SOCIAL WORK
Case Management Discharge Note                Selected Continued Care - Discharged on 10/1/2023 Admission date: 9/28/2023 - Discharge disposition: Home or Self Care      Destination    No services have been selected for the patient.                Durable Medical Equipment    No services have been selected for the patient.                Dialysis/Infusion    No services have been selected for the patient.                Home Medical Care    No services have been selected for the patient.                Therapy    No services have been selected for the patient.                Community Resources    No services have been selected for the patient.                Community & DME    No services have been selected for the patient.                         Final Discharge Disposition Code: 01 - home or self-care

## 2023-10-02 NOTE — TELEPHONE ENCOUNTER
Caller: scooter jay    Relationship to patient: Emergency Contact    Best call back number: [phone]    New or established patient?  [x] New  [] Established    Date of discharge: 10/01/23    Facility discharged from: [facility]    Diagnosis/Symptoms: ACUTE ISCHEMIC RIGHT MCA     Length of stay (If applicable): 3 DAYS    Specialty Only: Did you see a [facility] health provider?    [x] Yes  [] No  If so, who? DR. VERAS

## 2023-10-02 NOTE — TELEPHONE ENCOUNTER
Caller: scooter jay    Relationship to patient: Emergency Contact    Best call back number: [phone]    Patient is needing: PATIENTS WIFE CALLED TO SCHEDULE F/U WITH [facility]. PER CONSULT WITH KEIKO: Neurosurgery will follow-up in 2 weeks for groin check.  Follow-up head CT today does show area of completed stroke as expected.  PATIENTS WIFE STATES THIS IS TO BE A 6 MONTH F/U WITH CAROTID DUPLEX. PLEASE REVIEW, PLEASE CALL PATIENTS WIFE TO SCHEDULE/ADVISE.    THANK YOU

## 2023-10-02 NOTE — TELEPHONE ENCOUNTER
Spoke with patient's wife and per Dr. Flannery he will need f/up in 6 mo with doppler prior. Scheduled appt and mailed to patient. Pt's wife is aware.

## 2023-10-02 NOTE — OUTREACH NOTE
Call Center TCM Note      Flowsheet Row Responses   [facility] patient discharged from? [facility]   Does the patient have one of the following disease processes/diagnoses(primary or secondary)? Stroke   TCM attempt successful? Yes   Call start time 1344   Call end time 1349   Discharge diagnosis Right MCA syndrome with right ICA stenosis  Status post right carotid stent  Hypertension  Likely BPH with urinary retention   Person spoke with today (if not patient) and relationship Patient   Meds reviewed with patient/caregiver? Yes  [New: aspirin, atorvastatin calcium, tamsulosin, brilinta.   Stopped: indomethacin, lovastatin, fish oil]   Does the patient have all medications ordered at discharge? Yes  [Patient reports spouse gone to pharmacy to  meds. ]   Is the patient taking all medications as directed (includes completed medication regime)? Yes   Comments PCP Cherelle SHEA. Hospital follow up in place for 10/3 315pm.   Does the patient have an appointment with their PCP within 7-14 days of discharge? Yes   Has home health visited the patient within 72 hours of discharge? N/A   Psychosocial issues? No   Does the patient require any assistance with activities of daily living such as eating, bathing, dressing, walking, etc.? No   Does the patient have any residual symptoms from stroke/TIA? No   Residual symptoms comments Patient reports symptoms resolved.   Did the patient receive a copy of their discharge instructions? Yes   Nursing interventions Reviewed instructions with patient   What is the patient's perception of their health status since discharge? Returned to baseline/stable   Is the patient/caregiver able to teach back the risk factors for a stroke? High Cholesterol, Carotid or other artery disease   Is the patient/caregiver able to teach back signs and symptoms related to disease process for when to call PCP? Yes   Is the patient/caregiver able to teach back signs and symptoms related to  disease process for when to call 911? Yes   If the patient is a current smoker, are they able to teach back resources for cessation? Not a smoker   Is the patient/caregiver able to teach back the hierarchy of who to call/visit for symptoms/problems? PCP, Specialist, Home health nurse, Urgent Care, ED, 911 Yes   TCM call completed? Yes   Call end time 1349   Would this patient benefit from a Referral to [facility]? No   Is the patient interested in additional calls from an ambulatory ? No            Pia Mcgraw RN    10/2/2023, 13:52 EDT

## 2023-10-03 ENCOUNTER — OFFICE VISIT (OUTPATIENT)
Dept: FAMILY MEDICINE CLINIC | Facility: CLINIC | Age: 68
End: 2023-10-03
Payer: MEDICARE

## 2023-10-03 ENCOUNTER — TELEPHONE (OUTPATIENT)
Dept: NEUROSURGERY | Facility: CLINIC | Age: 68
End: 2023-10-03
Payer: MEDICARE

## 2023-10-03 VITALS
WEIGHT: 177 LBS | SYSTOLIC BLOOD PRESSURE: 122 MMHG | HEIGHT: 66 IN | OXYGEN SATURATION: 96 % | HEART RATE: 114 BPM | DIASTOLIC BLOOD PRESSURE: 83 MMHG | TEMPERATURE: 98.2 F | BODY MASS INDEX: 28.45 KG/M2

## 2023-10-03 DIAGNOSIS — E78.2 MIXED HYPERLIPIDEMIA: Chronic | ICD-10-CM

## 2023-10-03 DIAGNOSIS — R73.9 ELEVATED BLOOD SUGAR: ICD-10-CM

## 2023-10-03 DIAGNOSIS — I10 ESSENTIAL HYPERTENSION: Chronic | ICD-10-CM

## 2023-10-03 DIAGNOSIS — I63.9 RIGHT SIDED CEREBRAL HEMISPHERE CEREBROVASCULAR ACCIDENT (CVA): Primary | ICD-10-CM

## 2023-10-03 DIAGNOSIS — B35.9 TINEA: Chronic | ICD-10-CM

## 2023-10-03 NOTE — PROGRESS NOTES
Subjective        Abel Tafoya is a 68 y.o. male.     Chief Complaint   Patient presents with    Hospital Follow Up Visit     TCM       History of Present Illness  Patient is here for hospital follow up from 9/28/2023 thru 10/1/2023.  He was at work taking picture and bent over to pick something up and that is all he remembered he was taken to hospital via ambulance and found to have acute stroke . He went straight to interventional unit on arrival for cerebral angiogram. Found 90% internal carotid artery stenosis and had cast placement with no residual stenosis with an emblon embolus that resolved with TNKase. He had acute left sided weakness. He was discharged on brilinta and aspirin and high dose statin. He required lynn cath for urinary retention.   He started flomax.      Current outpatient and discharge medications have been reconciled for the patient.  Reviewed by: SHABBIR Ling     CBC 10/1/2023 wBC 12.30 high rbc 3.99 low  hgb A1C 6.10 high   Lipid panel: tC 183 normal tri 63 excellent HDL 46   high     Echo : 9/30/2023 left VEF 50.9%  Moderate calcification aortic v.    He has new problem with right 5th toe. He has been treating for fungal infection heals and then opens again.     The following portions of the patient's history were reviewed and updated as appropriate: allergies, current medications, past family history, past medical history, past social history, past surgical history and problem list.      Current Outpatient Medications:     amLODIPine (NORVASC) 5 MG tablet, TAKE 1 TABLET BY MOUTH DAILY, Disp: 90 tablet, Rfl: 1    ascorbic acid (VITAMIN C) 100 MG tablet,  VITAMIN C TABS, Disp: , Rfl:     aspirin 81 MG chewable tablet, Chew 1 tablet Daily for 90 days., Disp: 30 tablet, Rfl: 2    atorvastatin (LIPITOR) 80 MG tablet, Take 1 tablet by mouth Daily for 90 days., Disp: 30 tablet, Rfl: 2    Cholecalciferol 1000 units capsule,  VITAMIN D3 CAPS, Disp: , Rfl:     fluticasone  (Flonase Allergy Relief) 50 MCG/ACT nasal spray, 2 sprays into the nostril(s) as directed by provider Daily., Disp: 1 bottle, Rfl: 3    montelukast (SINGULAIR) 10 MG tablet, TAKE 1 TABLET BY MOUTH EVERY NIGHT, Disp: 90 tablet, Rfl: 1    Multiple Vitamins-Minerals (QC MENS DAILY MULTIVITAMIN) tablet, QC MENS DAILY MULTIVITAMIN TABS, Disp: , Rfl:     mupirocin (BACTROBAN) 2 % ointment, APPLY EXTERNALLY TO THE AFFECTED AREA ON LEGS TWICE DAILY UNTIL HEALED, Disp: , Rfl:     tamsulosin (FLOMAX) 0.4 MG capsule 24 hr capsule, Take 1 capsule by mouth Daily., Disp: 30 capsule, Rfl: 11    ticagrelor (BRILINTA) 90 MG tablet tablet, Take 1 tablet by mouth 2 (Two) Times a Day for 90 days., Disp: 60 each, Rfl: 2    Recent Results (from the past 4032 hour(s))   Lipid Panel    Collection Time: 07/05/23 11:05 AM    Specimen: Blood   Result Value Ref Range    Total Cholesterol 187 0 - 200 mg/dL    Triglycerides 213 (H) 0 - 150 mg/dL    HDL Cholesterol 39 (L) 40 - 60 mg/dL    LDL Cholesterol  111 (H) 0 - 100 mg/dL    VLDL Cholesterol 37 5 - 40 mg/dL    LDL/HDL Ratio 2.70    Comprehensive Metabolic Panel    Collection Time: 07/05/23 11:05 AM    Specimen: Blood   Result Value Ref Range    Glucose 99 65 - 99 mg/dL    BUN 17 8 - 23 mg/dL    Creatinine 1.04 0.76 - 1.27 mg/dL    Sodium 139 136 - 145 mmol/L    Potassium 4.2 3.5 - 5.2 mmol/L    Chloride 104 98 - 107 mmol/L    CO2 24.0 22.0 - 29.0 mmol/L    Calcium 8.9 8.6 - 10.5 mg/dL    Total Protein 7.3 6.0 - 8.5 g/dL    Albumin 4.1 3.5 - 5.2 g/dL    ALT (SGPT) 33 1 - 41 U/L    AST (SGOT) 28 1 - 40 U/L    Alkaline Phosphatase 68 39 - 117 U/L    Total Bilirubin 0.4 0.0 - 1.2 mg/dL    Globulin 3.2 gm/dL    A/G Ratio 1.3 g/dL    BUN/Creatinine Ratio 16.3 7.0 - 25.0    Anion Gap 11.0 5.0 - 15.0 mmol/L    eGFR 79.7 >60.0 mL/min/1.73   Vitamin D,25-Hydroxy    Collection Time: 07/05/23 11:05 AM    Specimen: Blood   Result Value Ref Range    25 Hydroxy, Vitamin D 37.4 30.0 - 100.0 ng/ml   ECG 12  Lead    Collection Time: 07/05/23 11:22 AM   Result Value Ref Range    QT Interval 376 ms   Green Top (Gel)    Collection Time: 09/28/23  2:03 PM   Result Value Ref Range    Extra Tube hold    Lavender Top    Collection Time: 09/28/23  2:03 PM   Result Value Ref Range    Extra Tube hold for add-on    Gold Top - SST    Collection Time: 09/28/23  2:03 PM   Result Value Ref Range    Extra Tube Hold for add-ons.    Light Blue Top    Collection Time: 09/28/23  2:03 PM   Result Value Ref Range    Extra Tube Hold for add-ons.    Comprehensive Metabolic Panel    Collection Time: 09/28/23  2:03 PM    Specimen: Blood   Result Value Ref Range    Glucose 105 (H) 65 - 99 mg/dL    BUN 16 8 - 23 mg/dL    Creatinine 1.16 0.76 - 1.27 mg/dL    Sodium 140 136 - 145 mmol/L    Potassium 3.6 3.5 - 5.2 mmol/L    Chloride 102 98 - 107 mmol/L    CO2 24.0 22.0 - 29.0 mmol/L    Calcium 9.8 8.6 - 10.5 mg/dL    Total Protein 7.3 6.0 - 8.5 g/dL    Albumin 4.4 3.5 - 5.2 g/dL    ALT (SGPT) 30 1 - 41 U/L    AST (SGOT) 24 1 - 40 U/L    Alkaline Phosphatase 72 39 - 117 U/L    Total Bilirubin 0.3 0.0 - 1.2 mg/dL    Globulin 2.9 gm/dL    A/G Ratio 1.5 g/dL    BUN/Creatinine Ratio 13.8 7.0 - 25.0    Anion Gap 14.0 5.0 - 15.0 mmol/L    eGFR 69.5 >60.0 mL/min/1.73   Protime-INR    Collection Time: 09/28/23  2:03 PM    Specimen: Blood   Result Value Ref Range    Protime 10.5 9.6 - 11.7 Seconds    INR 0.96 0.93 - 1.10   aPTT    Collection Time: 09/28/23  2:03 PM    Specimen: Blood   Result Value Ref Range    PTT 20.2 (L) 24.0 - 31.0 seconds   Single High Sensitivity Troponin T    Collection Time: 09/28/23  2:03 PM    Specimen: Blood   Result Value Ref Range    HS Troponin T 10 <15 ng/L   CBC Auto Differential    Collection Time: 09/28/23  2:03 PM    Specimen: Blood   Result Value Ref Range    WBC 10.20 3.40 - 10.80 10*3/mm3    RBC 4.48 4.14 - 5.80 10*6/mm3    Hemoglobin 14.6 13.0 - 17.7 g/dL    Hematocrit 43.2 37.5 - 51.0 %    MCV 96.4 79.0 - 97.0 fL    MCH  32.7 26.6 - 33.0 pg    MCHC 33.9 31.5 - 35.7 g/dL    RDW 13.8 12.3 - 15.4 %    RDW-SD 45.9 37.0 - 54.0 fl    MPV 7.7 6.0 - 12.0 fL    Platelets 359 140 - 450 10*3/mm3    Neutrophil % 38.9 (L) 42.7 - 76.0 %    Lymphocyte % 45.7 (H) 19.6 - 45.3 %    Monocyte % 11.9 5.0 - 12.0 %    Eosinophil % 2.9 0.3 - 6.2 %    Basophil % 0.6 0.0 - 1.5 %    Neutrophils, Absolute 4.00 1.70 - 7.00 10*3/mm3    Lymphocytes, Absolute 4.70 (H) 0.70 - 3.10 10*3/mm3    Monocytes, Absolute 1.20 (H) 0.10 - 0.90 10*3/mm3    Eosinophils, Absolute 0.30 0.00 - 0.40 10*3/mm3    Basophils, Absolute 0.10 0.00 - 0.20 10*3/mm3    nRBC 0.1 0.0 - 0.2 /100 WBC   POC Glucose Once    Collection Time: 09/28/23  2:25 PM    Specimen: Blood   Result Value Ref Range    Glucose 115 (H) 70 - 105 mg/dL   POC Glucose Once    Collection Time: 09/28/23  4:51 PM    Specimen: Blood   Result Value Ref Range    Glucose 129 70 - 130 mg/dL   POC Glucose Once    Collection Time: 09/29/23 12:34 AM    Specimen: Blood   Result Value Ref Range    Glucose 144 (H) 70 - 130 mg/dL   Basic Metabolic Panel    Collection Time: 09/29/23  3:11 AM    Specimen: Blood   Result Value Ref Range    Glucose 137 (H) 65 - 99 mg/dL    BUN 14 8 - 23 mg/dL    Creatinine 0.93 0.76 - 1.27 mg/dL    Sodium 138 136 - 145 mmol/L    Potassium 4.8 3.5 - 5.2 mmol/L    Chloride 107 98 - 107 mmol/L    CO2 19.3 (L) 22.0 - 29.0 mmol/L    Calcium 8.5 (L) 8.6 - 10.5 mg/dL    BUN/Creatinine Ratio 15.1 7.0 - 25.0    Anion Gap 11.7 5.0 - 15.0 mmol/L    eGFR 90.6 >60.0 mL/min/1.73   CBC (No Diff)    Collection Time: 09/29/23  3:11 AM    Specimen: Blood   Result Value Ref Range    WBC 11.10 (H) 3.40 - 10.80 10*3/mm3    RBC 4.09 (L) 4.14 - 5.80 10*6/mm3    Hemoglobin 13.4 13.0 - 17.7 g/dL    Hematocrit 39.6 37.5 - 51.0 %    MCV 96.8 79.0 - 97.0 fL    MCH 32.8 26.6 - 33.0 pg    MCHC 33.8 31.5 - 35.7 g/dL    RDW 13.7 12.3 - 15.4 %    RDW-SD 49.0 37.0 - 54.0 fl    MPV 9.5 6.0 - 12.0 fL    Platelets 312 140 - 450 10*3/mm3    Lipid Panel    Collection Time: 09/29/23  3:11 AM    Specimen: Blood   Result Value Ref Range    Total Cholesterol 183 0 - 200 mg/dL    Triglycerides 63 0 - 150 mg/dL    HDL Cholesterol 46 40 - 60 mg/dL    LDL Cholesterol  125 (H) 0 - 100 mg/dL    VLDL Cholesterol 12 5 - 40 mg/dL    LDL/HDL Ratio 2.70    POC Glucose Once    Collection Time: 09/29/23  6:08 AM    Specimen: Blood   Result Value Ref Range    Glucose 118 70 - 130 mg/dL   POC Glucose Once    Collection Time: 09/29/23 12:24 PM    Specimen: Blood   Result Value Ref Range    Glucose 117 70 - 130 mg/dL   POC Glucose Once    Collection Time: 09/29/23  6:14 PM    Specimen: Blood   Result Value Ref Range    Glucose 134 (H) 70 - 130 mg/dL   POC Glucose Once    Collection Time: 09/30/23 12:04 AM    Specimen: Blood   Result Value Ref Range    Glucose 124 70 - 130 mg/dL   Hemoglobin A1c    Collection Time: 09/30/23  3:13 AM    Specimen: Blood   Result Value Ref Range    Hemoglobin A1C 6.10 (H) 4.80 - 5.60 %   Adult Transthoracic Echo Complete w/ Color, Spectral and Contrast if Necessary Per Protocol    Collection Time: 09/30/23  9:56 AM   Result Value Ref Range    BH CV ECHO SHUNT ASSESSMENT PERFORMED (HIDDEN SCRIPTING) 1     EF(MOD-bp) 50.9 %    LVIDd 3.7 cm    LVIDs 2.6 cm    IVSd 1.40 cm    LVPWd 1.40 cm    FS 29.7 %    IVS/LVPW 1.00 cm    ESV(cubed) 17.6 ml    EDV(cubed) 50.7 ml    LVOT area 2.27 cm2    LV mass(C)d 186.9 grams    LVOT diam 1.70 cm    EDV(MOD-sp2) 112.0 ml    EDV(MOD-sp4) 107.0 ml    ESV(MOD-sp2) 53.0 ml    ESV(MOD-sp4) 52.0 ml    SV(MOD-sp2) 59.0 ml    SV(MOD-sp4) 55.0 ml    EF(MOD-sp2) 52.7 %    EF(MOD-sp4) 51.4 %    MV E max clif 59.1 cm/sec    MV A max clif 68.9 cm/sec    MV dec time 0.20 sec    MV E/A 0.86     Pulm A Revs Dur 0.11 sec    MV A dur 0.14 sec    LA ESV Index (BP) 20.0 ml/m2    Med Peak E' Clif 4.9 cm/sec    Lat Peak E' Clif 14.8 cm/sec    Avg E/e' ratio 6.00     SV(LVOT) 55.4 ml    SV(RVOT) 74.1 ml    Qp/Qs 1.34     RV Base 3.2 cm     RV Mid 3.2 cm    RV Length 7.6 cm    RV S' 11.7 cm/sec    Pulm Sys Clif 26.2 cm/sec    Pulm Walter Clif 27.1 cm/sec    Pulm S/D 0.97     Pulm A Revs Clif 30.9 cm/sec    LV V1 max 94.2 cm/sec    LV V1 max PG 3.5 mmHg    LV V1 mean PG 2.00 mmHg    LV V1 VTI 24.4 cm    Ao pk clif 151.0 cm/sec    Ao max PG 9.1 mmHg    Ao mean PG 5.0 mmHg    Ao V2 VTI 30.8 cm    FAISAL(I,D) 1.80 cm2    AI P1/2t 671.0 msec    MV max PG 4.2 mmHg    MV mean PG 1.00 mmHg    MV V2 VTI 29.7 cm    MV P1/2t 60.8 msec    MVA(P1/2t) 3.6 cm2    MVA(VTI) 1.86 cm2    MV dec slope 525.0 cm/sec2    MR max clif 163.0 cm/sec    MR max PG 10.6 mmHg    TR max clif 109.0 cm/sec    TR max PG 4.8 mmHg    RVOT diam 2.20 cm    RV V1 max PG 3.1 mmHg    RV V1 max 87.5 cm/sec    RV V1 VTI 19.5 cm    PA V2 max 109.0 cm/sec    PA acc time 0.10 sec    Ao root diam 3.2 cm    ACS 1.50 cm    Sinus 3.5 cm    STJ 2.42 cm    Dimensionless Index 0.80 (DI)    Ascending aorta 2.8 cm    RVSP(TR) 8 mmHg    RAP systole 3 mmHg   POC Glucose Once    Collection Time: 09/30/23 12:38 PM    Specimen: Blood   Result Value Ref Range    Glucose 118 70 - 130 mg/dL   Renal Function Panel    Collection Time: 10/01/23  5:38 AM    Specimen: Blood   Result Value Ref Range    Glucose 121 (H) 65 - 99 mg/dL    BUN 19 8 - 23 mg/dL    Creatinine 1.06 0.76 - 1.27 mg/dL    Sodium 139 136 - 145 mmol/L    Potassium 3.7 3.5 - 5.2 mmol/L    Chloride 104 98 - 107 mmol/L    CO2 21.1 (L) 22.0 - 29.0 mmol/L    Calcium 8.8 8.6 - 10.5 mg/dL    Albumin 3.9 3.5 - 5.2 g/dL    Phosphorus 2.5 2.5 - 4.5 mg/dL    Anion Gap 13.9 5.0 - 15.0 mmol/L    BUN/Creatinine Ratio 17.9 7.0 - 25.0    eGFR 77.4 >60.0 mL/min/1.73   CBC (No Diff)    Collection Time: 10/01/23  5:38 AM    Specimen: Blood   Result Value Ref Range    WBC 12.30 (H) 3.40 - 10.80 10*3/mm3    RBC 3.99 (L) 4.14 - 5.80 10*6/mm3    Hemoglobin 13.0 13.0 - 17.7 g/dL    Hematocrit 37.7 37.5 - 51.0 %    MCV 94.5 79.0 - 97.0 fL    MCH 32.6 26.6 - 33.0 pg    MCHC 34.5 31.5  "- 35.7 g/dL    RDW 13.1 12.3 - 15.4 %    RDW-SD 45.3 37.0 - 54.0 fl    MPV 9.7 6.0 - 12.0 fL    Platelets 310 140 - 450 10*3/mm3         Review of Systems    Objective     /83 (BP Location: Left arm, Patient Position: Sitting, Cuff Size: Large Adult)   Pulse 114   Temp 98.2 °F (36.8 °C) (Oral)   Ht 167 cm (65.75\")   Wt 80.3 kg (177 lb)   SpO2 96%   BMI 28.79 kg/m²     Physical Exam  Vitals and nursing note reviewed.   Constitutional:       Appearance: Normal appearance.   HENT:      Head: Normocephalic.      Right Ear: External ear normal.      Left Ear: External ear normal.      Nose: Nose normal.      Mouth/Throat:      Mouth: Mucous membranes are moist.   Eyes:      Conjunctiva/sclera: Conjunctivae normal.      Pupils: Pupils are equal, round, and reactive to light.   Cardiovascular:      Rate and Rhythm: Normal rate and regular rhythm.      Pulses: Normal pulses.      Heart sounds: Normal heart sounds.   Pulmonary:      Effort: Pulmonary effort is normal.      Breath sounds: Normal breath sounds.   Abdominal:      General: Bowel sounds are normal.      Palpations: Abdomen is soft.   Musculoskeletal:         General: Normal range of motion.      Cervical back: Neck supple.   Skin:     General: Skin is warm.             Comments: bruising   Neurological:      General: No focal deficit present.      Mental Status: He is alert and oriented to person, place, and time.   Psychiatric:         Mood and Affect: Mood normal.         Thought Content: Thought content normal.       Result Review :                Assessment & Plan    Diagnoses and all orders for this visit:    1. Right sided cerebral hemisphere cerebrovascular accident (CVA) (Primary)  Comments:  stable on brilinta aspirin high dose statin    2. Tinea  Comments:  referred to podiatry  Orders:  -     Ambulatory Referral to Podiatry    3. Essential hypertension  Comments:  stable    4. Mixed hyperlipidemia  Comments:  stable    5. Elevated blood " sugar  Comments:  A1C 6.1 will monitor  Orders:  -     Ambulatory Referral to Diabetic Education      Patient Instructions   Follow up one month  Avoid all white foods  You should hear from diabetic education more fruits and vegetables get protein other source.  You should hear from podiatry.    Follow Up   Return in about 1 month (around 11/3/2023).    Patient was given instructions and counseling regarding his condition or for health maintenance advice. Please see specific information pulled into the AVS if appropriate.     Cherelle Vila, APRN    10/03/23

## 2023-10-03 NOTE — TELEPHONE ENCOUNTER
I called patient to schedule wound check. He is right internal carotid stent placement by Dr. Flannery 9/28/23. Appointment scheduled.

## 2023-10-03 NOTE — TELEPHONE ENCOUNTER
----- Message from SHABBIR Wilburn sent at 9/29/2023 11:27 AM EDT -----  Regarding: Follow-up  Barbara, I am so sorry I forgot who Dr. Fitzpatrick's  is so I ended up sending this to you.    Can we just schedule this patient for 2-week follow-up with Dr. Fitzpatrick or [facility] for groin check

## 2023-10-03 NOTE — PATIENT INSTRUCTIONS
Follow up one month  Avoid all white foods  You should hear from diabetic education more fruits and vegetables get protein other source.  You should hear from podiatry.

## 2023-10-04 NOTE — CASE MANAGEMENT/SOCIAL WORK
Case Management Discharge Note      Final Note: Home no additional dc orders noted for CCP. Radha TREVINO/CCP         Selected Continued Care - Discharged on 10/1/2023 Admission date: 9/28/2023 - Discharge disposition: Home or Self Care      Destination    No services have been selected for the patient.                Durable Medical Equipment    No services have been selected for the patient.                Dialysis/Infusion    No services have been selected for the patient.                Home Medical Care    No services have been selected for the patient.                Therapy    No services have been selected for the patient.                Community Resources    No services have been selected for the patient.                Community & DME    No services have been selected for the patient.                    Transportation Services  Private: Car    Final Discharge Disposition Code: 01 - home or self-care

## 2023-10-06 NOTE — PROGRESS NOTES
Subjective   History of Present Illness: Abel Tafoya is a 68 y.o. male is here today for follow-up wound check. He has cerebral angiogram with stent placement done on 9/28/23.          The following portions of the patient's history were reviewed and updated as appropriate: allergies, current medications, past family history, past medical history, past social history, past surgical history, and problem list.      Past Medical History:   Diagnosis Date    Allergic     Broken neck     Colon tumor     Hyperlipidemia     Hypertension     Stroke         Past Surgical History:   Procedure Laterality Date    APPENDECTOMY      CAROTID STENT Right     COLON BIOPSY      NECK SURGERY      broken neck    SKIN GRAFT      TUMOR EXCISION      colon          Current Outpatient Medications:     amLODIPine (NORVASC) 5 MG tablet, TAKE 1 TABLET BY MOUTH DAILY, Disp: 90 tablet, Rfl: 1    ascorbic acid (VITAMIN C) 100 MG tablet, SM VITAMIN C TABS, Disp: , Rfl:     aspirin 81 MG chewable tablet, Chew 1 tablet Daily for 90 days., Disp: 30 tablet, Rfl: 2    atorvastatin (LIPITOR) 80 MG tablet, Take 1 tablet by mouth Daily for 90 days., Disp: 30 tablet, Rfl: 2    Cholecalciferol 1000 units capsule,  VITAMIN D3 CAPS, Disp: , Rfl:     fluticasone (Flonase Allergy Relief) 50 MCG/ACT nasal spray, 2 sprays into the nostril(s) as directed by provider Daily., Disp: 1 bottle, Rfl: 3    montelukast (SINGULAIR) 10 MG tablet, TAKE 1 TABLET BY MOUTH EVERY NIGHT, Disp: 90 tablet, Rfl: 1    Multiple Vitamins-Minerals (QC MENS DAILY MULTIVITAMIN) tablet, QC MENS DAILY MULTIVITAMIN TABS, Disp: , Rfl:     mupirocin (BACTROBAN) 2 % ointment, APPLY EXTERNALLY TO THE AFFECTED AREA ON LEGS TWICE DAILY UNTIL HEALED, Disp: , Rfl:     tamsulosin (FLOMAX) 0.4 MG capsule 24 hr capsule, Take 1 capsule by mouth Daily., Disp: 30 capsule, Rfl: 11    ticagrelor (BRILINTA) 90 MG tablet tablet, Take 1 tablet by mouth 2 (Two) Times a Day for 90 days., Disp: 60 each,  "Rfl: 2     No Known Allergies     Social History     Socioeconomic History    Marital status:    Tobacco Use    Smoking status: Never    Smokeless tobacco: Never   Vaping Use    Vaping Use: Never used   Substance and Sexual Activity    Alcohol use: No    Drug use: No    Sexual activity: Defer        Family History   Problem Relation Age of Onset    Diabetes Mother     Thyroid disease Mother     Hypertension Mother     Lung cancer Father     Heart disease Father     Hyperlipidemia Brother     Hypertension Brother     Heart disease Brother     Diabetes Brother         Review of Systems   Constitutional:  Negative for activity change.   Musculoskeletal:  Negative for back pain and gait problem.   Neurological:  Negative for facial asymmetry, speech difficulty, weakness, numbness and headaches.   Psychiatric/Behavioral:  Negative for confusion.        Objective     Vitals:    10/10/23 1253   BP: 124/68   Pulse: 77   Temp: 97.7 øF (36.5 øC)   SpO2: 96%   Weight: 82.1 kg (181 lb)   Height: 167.6 cm (66\")     Body mass index is 29.21 kg/mý.    Physical exam  Awake, alert, oriented x3  Pupils equal round reactive to light  Extraocular muscles intact  Face symmetric  Speech is fluent and clear  No pronator drift  Motor exam  Bilateral deltoids 5/5, bilateral biceps 5/5, bilateral triceps 5/5, bilateral wrist extension 5/5 bilateral hand  5/5  Bilateral hip flexion 5/5, bilateral knee extension 5/5, bilateral DF/PF 5/5  gait independent  Able to detect  light touch in all 4 extremities  Groin site soft, flat, no hematoma    Assessment & Plan   Independent Review of Radiographic Studies:      I personally reviewed the images from the following studies.    No new imaging to review    Medical Decision Makin-year-old male who is here for groin site check from recent angiogram with stent placement in the right ICA.    He has done really well posthospitalization.  Reports back to baseline.  He has been " compliant with aspirin and Plavix.  We discussed the importance of staying on cholesterol medication and keeping blood pressure controlled as well as remaining hydrated.  He was inquiring about a procedure with [facility] but I have informed him he will need to be on aspirin and Plavix for at least 6 months.  Follow-up in 6 months with carotid Dopplers.    Diagnoses and all orders for this visit:    1. Acute ischemic right MCA stroke (Primary)      Return in about 6 months (around 4/10/2024).  Carotid Dopplers have been ordered.

## 2023-10-10 ENCOUNTER — OFFICE VISIT (OUTPATIENT)
Dept: NEUROSURGERY | Facility: CLINIC | Age: 68
End: 2023-10-10
Payer: MEDICARE

## 2023-10-10 VITALS
HEIGHT: 66 IN | WEIGHT: 181 LBS | SYSTOLIC BLOOD PRESSURE: 124 MMHG | OXYGEN SATURATION: 96 % | DIASTOLIC BLOOD PRESSURE: 68 MMHG | TEMPERATURE: 97.7 F | HEART RATE: 77 BPM | BODY MASS INDEX: 29.09 KG/M2

## 2023-10-10 DIAGNOSIS — I63.511 ACUTE ISCHEMIC RIGHT MCA STROKE: Primary | ICD-10-CM

## 2023-10-11 ENCOUNTER — READMISSION MANAGEMENT (OUTPATIENT)
Dept: CALL CENTER | Facility: HOSPITAL | Age: 68
End: 2023-10-11
Payer: MEDICARE

## 2023-10-11 NOTE — OUTREACH NOTE
Stroke Week 2 Survey      Flowsheet Row Responses   [facility] patient discharged from? [facility]   Does the patient have one of the following disease processes/diagnoses(primary or secondary)? Stroke   Week 2 attempt successful? Yes   Call start time 1100   Call end time 1107   Discharge diagnosis Right MCA syndrome with right ICA stenosis  Status post right carotid stent  Hypertension  Likely BPH with urinary retention   Person spoke with today (if not patient) and relationship Patient   Meds reviewed with patient/caregiver? Yes   Is the patient having any side effects they believe may be caused by any medication additions or changes? No   Does the patient have all medications ordered at discharge? Yes   Is the patient taking all medications as directed (includes completed medication regime)? Yes   Comments regarding appointments saw Neurosurgery for f/u on 10/10/23. Ortho appt scheduled for 10/20/23.   Does the patient have a primary care provider?  Yes   Does the patient have an appointment with their PCP within 7 days of discharge? Yes   Comments regarding PCP saw PCP for f/u on 10/3/23.   Has the patient kept scheduled appointments due by today? Yes   Has home health visited the patient within 72 hours of discharge? N/A   Psychosocial issues? No   Does the patient require any assistance with activities of daily living such as eating, bathing, dressing, walking, etc.? No   Does the patient have any residual symptoms from stroke/TIA? No   Does the patient understand the diet ordered at discharge? Yes   Comments brief call with patient. pt is back to baseline. educated patient on diet, he states he is working on it. pt is checking his blood pressure at home. educated on b/p goal of 120/80.   Did the patient receive a copy of their discharge instructions? Yes   Nursing interventions Reviewed instructions with patient   What is the patient's perception of their health status since discharge? Returned to  baseline/stable   Nursing interventions Nurse provided patient education   Is the patient able to teach back FAST for Stroke? B alance: Watch for sudden loss of balance, E yes: Check for vision loss, F ace: Look for an uneven smile, A rm: Check if one arm is weak, S peech: Listen for slurred speech, T alicia: Call 9-1-1 right away   Is the patient/caregiver able to teach back the risk factors for a stroke? High blood pressure-goal below 120/80, Physical inactivity and obesity, History of TIAs, High Cholesterol, Carotid or other artery disease   Is the patient/caregiver able to teach back signs and symptoms related to disease process for when to call PCP? Yes   Is the patient/caregiver able to teach back signs and symptoms related to disease process for when to call 911? Yes   If the patient is a current smoker, are they able to teach back resources for cessation? Not a smoker   Is the patient/caregiver able to teach back the hierarchy of who to call/visit for symptoms/problems? PCP, Specialist, Home health nurse, Urgent Care, ED, 911 Yes   Week 2 call completed? Yes   Revoked No further contact(revokes)-requires comment   Is the patient interested in additional calls from an ambulatory ? No   Would this patient benefit from a Referral to [facility] Social Work? No   Graduated/Revoked comments pt doing well. denies any concerns or needs. no further calls needed.   Call end time 1107            Corinne MONK - Registered Nurse

## 2023-11-03 ENCOUNTER — OFFICE VISIT (OUTPATIENT)
Dept: FAMILY MEDICINE CLINIC | Facility: CLINIC | Age: 68
End: 2023-11-03
Payer: MEDICARE

## 2023-11-03 VITALS
HEART RATE: 83 BPM | OXYGEN SATURATION: 93 % | SYSTOLIC BLOOD PRESSURE: 116 MMHG | HEIGHT: 66 IN | WEIGHT: 177 LBS | BODY MASS INDEX: 28.45 KG/M2 | TEMPERATURE: 97.9 F | DIASTOLIC BLOOD PRESSURE: 75 MMHG

## 2023-11-03 DIAGNOSIS — K59.09 OTHER CONSTIPATION: Primary | ICD-10-CM

## 2023-11-03 DIAGNOSIS — I10 ESSENTIAL HYPERTENSION: ICD-10-CM

## 2023-11-03 NOTE — PATIENT INSTRUCTIONS
Fiber  one bar or cereal  Mirlax drink daily with water  Water warmed with lemon.  Metamucil bar is am with warm water .  Movement walking   Fiber, fiber.

## 2023-11-03 NOTE — PROGRESS NOTES
Subjective        Abel Tafoya is a 68 y.o. male.     Chief Complaint   Patient presents with    Hypertension     1 month f/u       History of Present Illness  Patient is here for management of his hypertension he is checking:  10/9 141/70  10/16 132/69  10/19 128/69  10/22 116/62 127/80 118.68.   Taking amlodipine 5 mg daily .     Elevated blood sugar A1C 6.1 he stopped drinking soft drinks.   He is using stevia on his cereal. He is losing weight .     Constipation: he is eating protein bars and this is helping  He is taking colace worse time night. Discussed options.         The following portions of the patient's history were reviewed and updated as appropriate: allergies, current medications, past family history, past medical history, past social history, past surgical history and problem list.      Current Outpatient Medications:     amLODIPine (NORVASC) 5 MG tablet, TAKE 1 TABLET BY MOUTH DAILY, Disp: 90 tablet, Rfl: 1    ascorbic acid (VITAMIN C) 100 MG tablet, SM VITAMIN C TABS, Disp: , Rfl:     aspirin 81 MG chewable tablet, Chew 1 tablet Daily for 90 days., Disp: 30 tablet, Rfl: 2    atorvastatin (LIPITOR) 80 MG tablet, Take 1 tablet by mouth Daily for 90 days., Disp: 30 tablet, Rfl: 2    Cholecalciferol 1000 units capsule,  VITAMIN D3 CAPS, Disp: , Rfl:     fluticasone (Flonase Allergy Relief) 50 MCG/ACT nasal spray, 2 sprays into the nostril(s) as directed by provider Daily., Disp: 1 bottle, Rfl: 3    montelukast (SINGULAIR) 10 MG tablet, TAKE 1 TABLET BY MOUTH EVERY NIGHT, Disp: 90 tablet, Rfl: 1    Multiple Vitamins-Minerals (QC MENS DAILY MULTIVITAMIN) tablet, QC MENS DAILY MULTIVITAMIN TABS, Disp: , Rfl:     mupirocin (BACTROBAN) 2 % ointment, APPLY EXTERNALLY TO THE AFFECTED AREA ON LEGS TWICE DAILY UNTIL HEALED, Disp: , Rfl:     tamsulosin (FLOMAX) 0.4 MG capsule 24 hr capsule, Take 1 capsule by mouth Daily., Disp: 30 capsule, Rfl: 11    ticagrelor (BRILINTA) 90 MG tablet tablet, Take 1 tablet  by mouth 2 (Two) Times a Day for 90 days., Disp: 60 each, Rfl: 2    Recent Results (from the past 4032 hour(s))   Lipid Panel    Collection Time: 07/05/23 11:05 AM    Specimen: Blood   Result Value Ref Range    Total Cholesterol 187 0 - 200 mg/dL    Triglycerides 213 (H) 0 - 150 mg/dL    HDL Cholesterol 39 (L) 40 - 60 mg/dL    LDL Cholesterol  111 (H) 0 - 100 mg/dL    VLDL Cholesterol 37 5 - 40 mg/dL    LDL/HDL Ratio 2.70    Comprehensive Metabolic Panel    Collection Time: 07/05/23 11:05 AM    Specimen: Blood   Result Value Ref Range    Glucose 99 65 - 99 mg/dL    BUN 17 8 - 23 mg/dL    Creatinine 1.04 0.76 - 1.27 mg/dL    Sodium 139 136 - 145 mmol/L    Potassium 4.2 3.5 - 5.2 mmol/L    Chloride 104 98 - 107 mmol/L    CO2 24.0 22.0 - 29.0 mmol/L    Calcium 8.9 8.6 - 10.5 mg/dL    Total Protein 7.3 6.0 - 8.5 g/dL    Albumin 4.1 3.5 - 5.2 g/dL    ALT (SGPT) 33 1 - 41 U/L    AST (SGOT) 28 1 - 40 U/L    Alkaline Phosphatase 68 39 - 117 U/L    Total Bilirubin 0.4 0.0 - 1.2 mg/dL    Globulin 3.2 gm/dL    A/G Ratio 1.3 g/dL    BUN/Creatinine Ratio 16.3 7.0 - 25.0    Anion Gap 11.0 5.0 - 15.0 mmol/L    eGFR 79.7 >60.0 mL/min/1.73   Vitamin D,25-Hydroxy    Collection Time: 07/05/23 11:05 AM    Specimen: Blood   Result Value Ref Range    25 Hydroxy, Vitamin D 37.4 30.0 - 100.0 ng/ml   ECG 12 Lead    Collection Time: 07/05/23 11:22 AM   Result Value Ref Range    QT Interval 376 ms   Green Top (Gel)    Collection Time: 09/28/23  2:03 PM   Result Value Ref Range    Extra Tube hold    Lavender Top    Collection Time: 09/28/23  2:03 PM   Result Value Ref Range    Extra Tube hold for add-on    Gold Top - SST    Collection Time: 09/28/23  2:03 PM   Result Value Ref Range    Extra Tube Hold for add-ons.    Light Blue Top    Collection Time: 09/28/23  2:03 PM   Result Value Ref Range    Extra Tube Hold for add-ons.    Comprehensive Metabolic Panel    Collection Time: 09/28/23  2:03 PM    Specimen: Blood   Result Value Ref Range     Glucose 105 (H) 65 - 99 mg/dL    BUN 16 8 - 23 mg/dL    Creatinine 1.16 0.76 - 1.27 mg/dL    Sodium 140 136 - 145 mmol/L    Potassium 3.6 3.5 - 5.2 mmol/L    Chloride 102 98 - 107 mmol/L    CO2 24.0 22.0 - 29.0 mmol/L    Calcium 9.8 8.6 - 10.5 mg/dL    Total Protein 7.3 6.0 - 8.5 g/dL    Albumin 4.4 3.5 - 5.2 g/dL    ALT (SGPT) 30 1 - 41 U/L    AST (SGOT) 24 1 - 40 U/L    Alkaline Phosphatase 72 39 - 117 U/L    Total Bilirubin 0.3 0.0 - 1.2 mg/dL    Globulin 2.9 gm/dL    A/G Ratio 1.5 g/dL    BUN/Creatinine Ratio 13.8 7.0 - 25.0    Anion Gap 14.0 5.0 - 15.0 mmol/L    eGFR 69.5 >60.0 mL/min/1.73   Protime-INR    Collection Time: 09/28/23  2:03 PM    Specimen: Blood   Result Value Ref Range    Protime 10.5 9.6 - 11.7 Seconds    INR 0.96 0.93 - 1.10   aPTT    Collection Time: 09/28/23  2:03 PM    Specimen: Blood   Result Value Ref Range    PTT 20.2 (L) 24.0 - 31.0 seconds   Single High Sensitivity Troponin T    Collection Time: 09/28/23  2:03 PM    Specimen: Blood   Result Value Ref Range    HS Troponin T 10 <15 ng/L   CBC Auto Differential    Collection Time: 09/28/23  2:03 PM    Specimen: Blood   Result Value Ref Range    WBC 10.20 3.40 - 10.80 10*3/mm3    RBC 4.48 4.14 - 5.80 10*6/mm3    Hemoglobin 14.6 13.0 - 17.7 g/dL    Hematocrit 43.2 37.5 - 51.0 %    MCV 96.4 79.0 - 97.0 fL    MCH 32.7 26.6 - 33.0 pg    MCHC 33.9 31.5 - 35.7 g/dL    RDW 13.8 12.3 - 15.4 %    RDW-SD 45.9 37.0 - 54.0 fl    MPV 7.7 6.0 - 12.0 fL    Platelets 359 140 - 450 10*3/mm3    Neutrophil % 38.9 (L) 42.7 - 76.0 %    Lymphocyte % 45.7 (H) 19.6 - 45.3 %    Monocyte % 11.9 5.0 - 12.0 %    Eosinophil % 2.9 0.3 - 6.2 %    Basophil % 0.6 0.0 - 1.5 %    Neutrophils, Absolute 4.00 1.70 - 7.00 10*3/mm3    Lymphocytes, Absolute 4.70 (H) 0.70 - 3.10 10*3/mm3    Monocytes, Absolute 1.20 (H) 0.10 - 0.90 10*3/mm3    Eosinophils, Absolute 0.30 0.00 - 0.40 10*3/mm3    Basophils, Absolute 0.10 0.00 - 0.20 10*3/mm3    nRBC 0.1 0.0 - 0.2 /100 WBC   POC Glucose  Once    Collection Time: 09/28/23  2:25 PM    Specimen: Blood   Result Value Ref Range    Glucose 115 (H) 70 - 105 mg/dL   POC Glucose Once    Collection Time: 09/28/23  4:51 PM    Specimen: Blood   Result Value Ref Range    Glucose 129 70 - 130 mg/dL   POC Glucose Once    Collection Time: 09/29/23 12:34 AM    Specimen: Blood   Result Value Ref Range    Glucose 144 (H) 70 - 130 mg/dL   Basic Metabolic Panel    Collection Time: 09/29/23  3:11 AM    Specimen: Blood   Result Value Ref Range    Glucose 137 (H) 65 - 99 mg/dL    BUN 14 8 - 23 mg/dL    Creatinine 0.93 0.76 - 1.27 mg/dL    Sodium 138 136 - 145 mmol/L    Potassium 4.8 3.5 - 5.2 mmol/L    Chloride 107 98 - 107 mmol/L    CO2 19.3 (L) 22.0 - 29.0 mmol/L    Calcium 8.5 (L) 8.6 - 10.5 mg/dL    BUN/Creatinine Ratio 15.1 7.0 - 25.0    Anion Gap 11.7 5.0 - 15.0 mmol/L    eGFR 90.6 >60.0 mL/min/1.73   CBC (No Diff)    Collection Time: 09/29/23  3:11 AM    Specimen: Blood   Result Value Ref Range    WBC 11.10 (H) 3.40 - 10.80 10*3/mm3    RBC 4.09 (L) 4.14 - 5.80 10*6/mm3    Hemoglobin 13.4 13.0 - 17.7 g/dL    Hematocrit 39.6 37.5 - 51.0 %    MCV 96.8 79.0 - 97.0 fL    MCH 32.8 26.6 - 33.0 pg    MCHC 33.8 31.5 - 35.7 g/dL    RDW 13.7 12.3 - 15.4 %    RDW-SD 49.0 37.0 - 54.0 fl    MPV 9.5 6.0 - 12.0 fL    Platelets 312 140 - 450 10*3/mm3   Lipid Panel    Collection Time: 09/29/23  3:11 AM    Specimen: Blood   Result Value Ref Range    Total Cholesterol 183 0 - 200 mg/dL    Triglycerides 63 0 - 150 mg/dL    HDL Cholesterol 46 40 - 60 mg/dL    LDL Cholesterol  125 (H) 0 - 100 mg/dL    VLDL Cholesterol 12 5 - 40 mg/dL    LDL/HDL Ratio 2.70    POC Glucose Once    Collection Time: 09/29/23  6:08 AM    Specimen: Blood   Result Value Ref Range    Glucose 118 70 - 130 mg/dL   POC Glucose Once    Collection Time: 09/29/23 12:24 PM    Specimen: Blood   Result Value Ref Range    Glucose 117 70 - 130 mg/dL   POC Glucose Once    Collection Time: 09/29/23  6:14 PM    Specimen: Blood    Result Value Ref Range    Glucose 134 (H) 70 - 130 mg/dL   POC Glucose Once    Collection Time: 09/30/23 12:04 AM    Specimen: Blood   Result Value Ref Range    Glucose 124 70 - 130 mg/dL   Hemoglobin A1c    Collection Time: 09/30/23  3:13 AM    Specimen: Blood   Result Value Ref Range    Hemoglobin A1C 6.10 (H) 4.80 - 5.60 %   Adult Transthoracic Echo Complete w/ Color, Spectral and Contrast if Necessary Per Protocol    Collection Time: 09/30/23  9:56 AM   Result Value Ref Range    BH CV ECHO SHUNT ASSESSMENT PERFORMED (HIDDEN SCRIPTING) 1     EF(MOD-bp) 50.9 %    LVIDd 3.7 cm    LVIDs 2.6 cm    IVSd 1.40 cm    LVPWd 1.40 cm    FS 29.7 %    IVS/LVPW 1.00 cm    ESV(cubed) 17.6 ml    EDV(cubed) 50.7 ml    LVOT area 2.27 cm2    LV mass(C)d 186.9 grams    LVOT diam 1.70 cm    EDV(MOD-sp2) 112.0 ml    EDV(MOD-sp4) 107.0 ml    ESV(MOD-sp2) 53.0 ml    ESV(MOD-sp4) 52.0 ml    SV(MOD-sp2) 59.0 ml    SV(MOD-sp4) 55.0 ml    EF(MOD-sp2) 52.7 %    EF(MOD-sp4) 51.4 %    MV E max clif 59.1 cm/sec    MV A max clif 68.9 cm/sec    MV dec time 0.20 sec    MV E/A 0.86     Pulm A Revs Dur 0.11 sec    MV A dur 0.14 sec    LA ESV Index (BP) 20.0 ml/m2    Med Peak E' Clif 4.9 cm/sec    Lat Peak E' Clif 14.8 cm/sec    Avg E/e' ratio 6.00     SV(LVOT) 55.4 ml    SV(RVOT) 74.1 ml    Qp/Qs 1.34     RV Base 3.2 cm    RV Mid 3.2 cm    RV Length 7.6 cm    RV S' 11.7 cm/sec    Pulm Sys Clif 26.2 cm/sec    Pulm Walter Clif 27.1 cm/sec    Pulm S/D 0.97     Pulm A Revs Clif 30.9 cm/sec    LV V1 max 94.2 cm/sec    LV V1 max PG 3.5 mmHg    LV V1 mean PG 2.00 mmHg    LV V1 VTI 24.4 cm    Ao pk clif 151.0 cm/sec    Ao max PG 9.1 mmHg    Ao mean PG 5.0 mmHg    Ao V2 VTI 30.8 cm    FAISAL(I,D) 1.80 cm2    AI P1/2t 671.0 msec    MV max PG 4.2 mmHg    MV mean PG 1.00 mmHg    MV V2 VTI 29.7 cm    MV P1/2t 60.8 msec    MVA(P1/2t) 3.6 cm2    MVA(VTI) 1.86 cm2    MV dec slope 525.0 cm/sec2    MR max clif 163.0 cm/sec    MR max PG 10.6 mmHg    TR max clif 109.0 cm/sec    TR  "max PG 4.8 mmHg    RVOT diam 2.20 cm    RV V1 max PG 3.1 mmHg    RV V1 max 87.5 cm/sec    RV V1 VTI 19.5 cm    PA V2 max 109.0 cm/sec    PA acc time 0.10 sec    Ao root diam 3.2 cm    ACS 1.50 cm    Sinus 3.5 cm    STJ 2.42 cm    Dimensionless Index 0.80 (DI)    Ascending aorta 2.8 cm    RVSP(TR) 8 mmHg    RAP systole 3 mmHg   POC Glucose Once    Collection Time: 09/30/23 12:38 PM    Specimen: Blood   Result Value Ref Range    Glucose 118 70 - 130 mg/dL   Renal Function Panel    Collection Time: 10/01/23  5:38 AM    Specimen: Blood   Result Value Ref Range    Glucose 121 (H) 65 - 99 mg/dL    BUN 19 8 - 23 mg/dL    Creatinine 1.06 0.76 - 1.27 mg/dL    Sodium 139 136 - 145 mmol/L    Potassium 3.7 3.5 - 5.2 mmol/L    Chloride 104 98 - 107 mmol/L    CO2 21.1 (L) 22.0 - 29.0 mmol/L    Calcium 8.8 8.6 - 10.5 mg/dL    Albumin 3.9 3.5 - 5.2 g/dL    Phosphorus 2.5 2.5 - 4.5 mg/dL    Anion Gap 13.9 5.0 - 15.0 mmol/L    BUN/Creatinine Ratio 17.9 7.0 - 25.0    eGFR 77.4 >60.0 mL/min/1.73   CBC (No Diff)    Collection Time: 10/01/23  5:38 AM    Specimen: Blood   Result Value Ref Range    WBC 12.30 (H) 3.40 - 10.80 10*3/mm3    RBC 3.99 (L) 4.14 - 5.80 10*6/mm3    Hemoglobin 13.0 13.0 - 17.7 g/dL    Hematocrit 37.7 37.5 - 51.0 %    MCV 94.5 79.0 - 97.0 fL    MCH 32.6 26.6 - 33.0 pg    MCHC 34.5 31.5 - 35.7 g/dL    RDW 13.1 12.3 - 15.4 %    RDW-SD 45.3 37.0 - 54.0 fl    MPV 9.7 6.0 - 12.0 fL    Platelets 310 140 - 450 10*3/mm3         Review of Systems    Objective     /75 (BP Location: Left arm, Patient Position: Sitting, Cuff Size: Large Adult)   Pulse 83   Temp 97.9 °F (36.6 °C) (Infrared)   Ht 167.6 cm (66\")   Wt 80.3 kg (177 lb)   SpO2 93%   BMI 28.57 kg/m²     Physical Exam    Result Review :                Assessment & Plan    Diagnoses and all orders for this visit:    1. Other constipation (Primary)  Comments:  increased fiber in his diet. continue protein bar    2. Essential hypertension      Patient " Instructions   Fiber  one bar or cereal  Mirlax drink daily with water  Water warmed with lemon.  Metamucil bar is am with warm water .  Movement walking   Fiber, fiber.       Follow Up   Return in about 6 months (around 5/3/2024).    Patient was given instructions and counseling regarding his condition or for health maintenance advice. Please see specific information pulled into the AVS if appropriate.     Cherelle Vila, APRN    11/03/23

## 2023-11-27 RX ORDER — AMLODIPINE BESYLATE 5 MG/1
5 TABLET ORAL DAILY
Qty: 90 TABLET | Refills: 1 | Status: SHIPPED | OUTPATIENT
Start: 2023-11-27

## 2023-11-30 ENCOUNTER — TELEPHONE (OUTPATIENT)
Dept: FAMILY MEDICINE CLINIC | Facility: CLINIC | Age: 68
End: 2023-11-30
Payer: MEDICARE

## 2023-11-30 RX ORDER — ATORVASTATIN CALCIUM 80 MG/1
80 TABLET, FILM COATED ORAL DAILY
Qty: 90 TABLET | Refills: 1 | Status: SHIPPED | OUTPATIENT
Start: 2023-11-30 | End: 2024-05-28

## 2023-11-30 RX ORDER — MONTELUKAST SODIUM 10 MG/1
10 TABLET ORAL NIGHTLY
Qty: 90 TABLET | Refills: 1 | Status: SHIPPED | OUTPATIENT
Start: 2023-11-30

## 2023-12-26 RX ORDER — ATORVASTATIN CALCIUM 80 MG/1
80 TABLET, FILM COATED ORAL DAILY
Qty: 90 TABLET | Refills: 1 | Status: SHIPPED | OUTPATIENT
Start: 2023-12-26 | End: 2024-06-23

## 2023-12-26 NOTE — TELEPHONE ENCOUNTER
Caller: scooter jay    Relationship: Emergency Contact    Best call back number: [phone]     Requested Prescriptions:   Requested Prescriptions     Pending Prescriptions Disp Refills    atorvastatin (LIPITOR) 80 MG tablet 90 tablet 1     Sig: Take 1 tablet by mouth Daily for 180 days.        [id number] [place] - [address] OF [facility] [address] [facility] [phone] [facility] [phone] FX     Last office visit with prescribing clinician: 11/3/2023   Last telemedicine visit with prescribing clinician: Visit date not found   Next office visit with prescribing clinician: 2/2/2024     Additional details provided by patient:     Does the patient have less than a 3 day supply:  [x] Yes  [] No    Would you like a call back once the refill request has been completed: [] Yes [] No    If the office needs to give you a call back, can they leave a voicemail: [] Yes [] No    April Alvaro Sequeira Rep   12/26/23 13:00 EST

## 2023-12-27 ENCOUNTER — TELEPHONE (OUTPATIENT)
Dept: FAMILY MEDICINE CLINIC | Facility: CLINIC | Age: 68
End: 2023-12-27
Payer: MEDICARE

## 2023-12-30 DIAGNOSIS — Z95.828 HISTORY OF INTRAVASCULAR STENT PLACEMENT: Primary | ICD-10-CM

## 2024-01-16 ENCOUNTER — OFFICE VISIT (OUTPATIENT)
Dept: PODIATRY | Facility: CLINIC | Age: 69
End: 2024-01-16
Payer: MEDICARE

## 2024-01-16 VITALS — BODY MASS INDEX: 28.45 KG/M2 | HEIGHT: 66 IN | RESPIRATION RATE: 20 BRPM | WEIGHT: 177 LBS

## 2024-01-16 DIAGNOSIS — R23.4 FISSURE IN SKIN: ICD-10-CM

## 2024-01-16 DIAGNOSIS — L98.8 SKIN MACERATION: Primary | ICD-10-CM

## 2024-01-16 NOTE — PATIENT INSTRUCTIONS
OTC foot powder/spray  Dry fluffed gauze twice daily.   Change socks twice daily.  Toe spacer between 4th and 5th toe when walking
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
## 2024-01-30 ENCOUNTER — OFFICE VISIT (OUTPATIENT)
Dept: PODIATRY | Facility: CLINIC | Age: 69
End: 2024-01-30
Payer: MEDICARE

## 2024-01-30 VITALS — RESPIRATION RATE: 20 BRPM | BODY MASS INDEX: 28.45 KG/M2 | HEIGHT: 66 IN | WEIGHT: 177 LBS

## 2024-01-30 DIAGNOSIS — L98.8 SKIN MACERATION: Primary | ICD-10-CM

## 2024-01-30 DIAGNOSIS — R23.4 FISSURE IN SKIN: ICD-10-CM

## 2024-01-30 NOTE — PROGRESS NOTES
"01/30/2024  Foot and Ankle Surgery - Established Patient/Follow-up  Provider: SHABBIR Whitney   Location: [facility]    Subjective:  Abel Tafoya is a 68 y.o. male.     Chief Complaint   Patient presents with    Right Foot - Follow-up, Wound Check    Follow-up     MELANYMejia SHEA  1/3/2023       The patient is here today for follow-up of right foot toe ulcer.    He reports his toe is doing better. He states he sprays his toe every morning and every night. He acknowledges his toe feels pretty good. He reports he is not using the toe spacer much because it falls out. He notes his toe has been sore when he presses on it.    He reports he prediabetic and his A1c was 6.1%. He states he had a stroke in 09/2023.     No Known Allergies    Current Outpatient Medications on File Prior to Visit   Medication Sig Dispense Refill    amLODIPine (NORVASC) 5 MG tablet Take 1 tablet by mouth Daily. 90 tablet 1    ascorbic acid (VITAMIN C) 100 MG tablet SM VITAMIN C TABS      atorvastatin (LIPITOR) 80 MG tablet Take 1 tablet by mouth Daily for 180 days. 90 tablet 1    Cholecalciferol 1000 units capsule SM VITAMIN D3 CAPS      fluticasone (Flonase Allergy Relief) 50 MCG/ACT nasal spray 2 sprays into the nostril(s) as directed by provider Daily. 1 bottle 3    montelukast (SINGULAIR) 10 MG tablet Take 1 tablet by mouth Every Night. 90 tablet 1    Multiple Vitamins-Minerals (QC MENS DAILY MULTIVITAMIN) tablet QC MENS DAILY MULTIVITAMIN TABS      mupirocin (BACTROBAN) 2 % ointment APPLY EXTERNALLY TO THE AFFECTED AREA ON LEGS TWICE DAILY UNTIL HEALED      tamsulosin (FLOMAX) 0.4 MG capsule 24 hr capsule Take 1 capsule by mouth Daily. 30 capsule 11    ticagrelor (Brilinta) 90 MG tablet tablet Take 1 tablet by mouth 2 (Two) Times a Day. 60 tablet 2     No current facility-administered medications on file prior to visit.       Objective   Resp 20   Ht 167.6 cm (66\")   Wt 80.3 kg (177 lb)   BMI 28.57 kg/m²     Foot/Ankle " Exam    VASCULAR     Right Foot Vascularity   Normal vascular exam    Dorsalis pedis:  2+  Posterior tibial:  2+  Skin temperature:  warm  Edema grading:  None  CFT:  < 3 seconds  Pedal hair growth:  Present  Varicosities:  none     NEUROLOGIC     Right Foot Neurologic   Normal sensation    Light touch sensation: normal  Vibratory sensation: normal  Hot/Cold sensation: normal    MUSCLE STRENGTH     Right Foot Muscle Strength   Normal strength    Foot dorsiflexion:  5  Foot plantar flexion:  5  Foot inversion:  5  Foot eversion:  5    RANGE OF MOTION     Right Foot Range of Motion   Foot and ankle ROM within normal limits      DERMATOLOGIC      Right Foot Dermatologic   Skin  Positive for ulcer (fifth toe).      Right foot additional comments: Right 5th toe fissure has healed.        Assessment & Plan   Diagnoses and all orders for this visit:    1. Skin maceration (Primary)    2. Fissure in skin      I will provide him with a toe spacer today. The patient will follow up in 2 weeks. If he is doing well at that time, he may cancel the appointment. Recommend the patient continue using the spray.    No orders of the defined types were placed in this encounter.       Transcribed from ambient dictation for SHABBIR Salazar by Mak Castro.  01/30/24   10:54 EST    Patient or patient representative verbalized consent to the visit recording.  I have personally performed the services described in this document as transcribed by the above individual, and it is both accurate and complete.  SHABBIR Salazar  1/30/2024  12:38 EST

## 2024-02-02 ENCOUNTER — LAB (OUTPATIENT)
Dept: LAB | Facility: HOSPITAL | Age: 69
End: 2024-02-02
Payer: MEDICARE

## 2024-02-02 ENCOUNTER — OFFICE VISIT (OUTPATIENT)
Dept: FAMILY MEDICINE CLINIC | Facility: CLINIC | Age: 69
End: 2024-02-02
Payer: MEDICARE

## 2024-02-02 VITALS
HEIGHT: 66 IN | TEMPERATURE: 97.8 F | OXYGEN SATURATION: 97 % | BODY MASS INDEX: 28.28 KG/M2 | HEART RATE: 78 BPM | WEIGHT: 176 LBS | DIASTOLIC BLOOD PRESSURE: 86 MMHG | SYSTOLIC BLOOD PRESSURE: 127 MMHG

## 2024-02-02 DIAGNOSIS — I63.9 RIGHT SIDED CEREBRAL HEMISPHERE CEREBROVASCULAR ACCIDENT (CVA): ICD-10-CM

## 2024-02-02 DIAGNOSIS — M54.6 THORACIC SPINE PAIN: Primary | ICD-10-CM

## 2024-02-02 DIAGNOSIS — E78.2 MIXED HYPERLIPIDEMIA: ICD-10-CM

## 2024-02-02 DIAGNOSIS — R73.9 ELEVATED BLOOD SUGAR: ICD-10-CM

## 2024-02-02 DIAGNOSIS — I10 ESSENTIAL HYPERTENSION: ICD-10-CM

## 2024-02-02 DIAGNOSIS — Z23 NEED FOR VACCINATION: ICD-10-CM

## 2024-02-02 PROBLEM — E66.3 OVERWEIGHT WITH BODY MASS INDEX (BMI) OF 28 TO 28.9 IN ADULT: Status: ACTIVE | Noted: 2024-02-02

## 2024-02-02 LAB
ALBUMIN SERPL-MCNC: 4.6 G/DL (ref 3.5–5.2)
ALBUMIN/GLOB SERPL: 1.5 G/DL
ALP SERPL-CCNC: 86 U/L (ref 39–117)
ALT SERPL W P-5'-P-CCNC: 19 U/L (ref 1–41)
ANION GAP SERPL CALCULATED.3IONS-SCNC: 13.7 MMOL/L (ref 5–15)
AST SERPL-CCNC: 23 U/L (ref 1–40)
BILIRUB SERPL-MCNC: 0.6 MG/DL (ref 0–1.2)
BUN SERPL-MCNC: 23 MG/DL (ref 8–23)
BUN/CREAT SERPL: 21.3 (ref 7–25)
CALCIUM SPEC-SCNC: 9.4 MG/DL (ref 8.6–10.5)
CHLORIDE SERPL-SCNC: 105 MMOL/L (ref 98–107)
CHOLEST SERPL-MCNC: 143 MG/DL (ref 0–200)
CO2 SERPL-SCNC: 23.3 MMOL/L (ref 22–29)
CREAT SERPL-MCNC: 1.08 MG/DL (ref 0.76–1.27)
EGFRCR SERPLBLD CKD-EPI 2021: 75.7 ML/MIN/1.73
GLOBULIN UR ELPH-MCNC: 3 GM/DL
GLUCOSE SERPL-MCNC: 84 MG/DL (ref 65–99)
HBA1C MFR BLD: 6.2 % (ref 4.8–5.6)
HDLC SERPL-MCNC: 47 MG/DL (ref 40–60)
LDLC SERPL CALC-MCNC: 80 MG/DL (ref 0–100)
LDLC/HDLC SERPL: 1.68 {RATIO}
POTASSIUM SERPL-SCNC: 4.5 MMOL/L (ref 3.5–5.2)
PROT SERPL-MCNC: 7.6 G/DL (ref 6–8.5)
SODIUM SERPL-SCNC: 142 MMOL/L (ref 136–145)
TRIGL SERPL-MCNC: 85 MG/DL (ref 0–150)
VLDLC SERPL-MCNC: 16 MG/DL (ref 5–40)

## 2024-02-02 PROCEDURE — 36415 COLL VENOUS BLD VENIPUNCTURE: CPT

## 2024-02-02 PROCEDURE — 80053 COMPREHEN METABOLIC PANEL: CPT

## 2024-02-02 PROCEDURE — 83036 HEMOGLOBIN GLYCOSYLATED A1C: CPT

## 2024-02-02 PROCEDURE — 80061 LIPID PANEL: CPT

## 2024-02-02 NOTE — PROGRESS NOTES
Subjective        Abel Tafoya is a 68 y.o. male.     Chief Complaint   Patient presents with    Hypertension     6 month f/u       Hypertension      Patient is here for management of his chronic medical problems:   Hypertension, hyperlipidemia allergies BPH, ICAO right. CVA in past. New problem back pain , nose bleeds.       New problem back pain: showing me left flank pain. Now he said is his whole back. Started after the stroke he had fall and had bruise there after stroke. No blood in urine. Stretching makes better heat makes it better. He is maintenace everywhere standing in one spot makes worse. He has had kidney stones in past . I read the notes from urology. PsA normal.     Hypertension: taking amlodipine 5 mg daily monitors blood pressure.   110/69 111/63 117/65.  History of CVA with ICAO right.     Hyperlipidemia; atorvastatin 80 mg daily history of stroke.     CVA acute ischemic right MCA stroke with ICAO.     Toe ulcer treated by podiatry. Recent notes reviewed he is has a toe patch f/u 2 weeks healing well.     Elevated A1C 6.10 9/30/2024. He is drinking water only no cokes.   He uses no sugar in drinks drinking protein drink in am.     ED taking viagra as needed    BPH followed by urology on flomax.          The following portions of the patient's history were reviewed and updated as appropriate: allergies, current medications, past family history, past medical history, past social history, past surgical history and problem list.      Current Outpatient Medications:     amLODIPine (NORVASC) 5 MG tablet, Take 1 tablet by mouth Daily., Disp: 90 tablet, Rfl: 1    ascorbic acid (VITAMIN C) 100 MG tablet,  VITAMIN C TABS, Disp: , Rfl:     atorvastatin (LIPITOR) 80 MG tablet, Take 1 tablet by mouth Daily for 180 days., Disp: 90 tablet, Rfl: 1    Cholecalciferol 1000 units capsule, SM VITAMIN D3 CAPS, Disp: , Rfl:     fluticasone (Flonase Allergy Relief) 50 MCG/ACT nasal spray, 2 sprays into the  nostril(s) as directed by provider Daily., Disp: 1 bottle, Rfl: 3    montelukast (SINGULAIR) 10 MG tablet, Take 1 tablet by mouth Every Night., Disp: 90 tablet, Rfl: 1    Multiple Vitamins-Minerals (QC MENS DAILY MULTIVITAMIN) tablet, QC MENS DAILY MULTIVITAMIN TABS, Disp: , Rfl:     mupirocin (BACTROBAN) 2 % ointment, APPLY EXTERNALLY TO THE AFFECTED AREA ON LEGS TWICE DAILY UNTIL HEALED, Disp: , Rfl:     tamsulosin (FLOMAX) 0.4 MG capsule 24 hr capsule, Take 1 capsule by mouth Daily., Disp: 30 capsule, Rfl: 11    ticagrelor (Brilinta) 90 MG tablet tablet, Take 1 tablet by mouth 2 (Two) Times a Day., Disp: 60 tablet, Rfl: 2    Recent Results (from the past 4032 hour(s))   Green Top (Gel)    Collection Time: 09/28/23  2:03 PM   Result Value Ref Range    Extra Tube hold    Lavender Top    Collection Time: 09/28/23  2:03 PM   Result Value Ref Range    Extra Tube hold for add-on    Gold Top - SST    Collection Time: 09/28/23  2:03 PM   Result Value Ref Range    Extra Tube Hold for add-ons.    Light Blue Top    Collection Time: 09/28/23  2:03 PM   Result Value Ref Range    Extra Tube Hold for add-ons.    Comprehensive Metabolic Panel    Collection Time: 09/28/23  2:03 PM    Specimen: Blood   Result Value Ref Range    Glucose 105 (H) 65 - 99 mg/dL    BUN 16 8 - 23 mg/dL    Creatinine 1.16 0.76 - 1.27 mg/dL    Sodium 140 136 - 145 mmol/L    Potassium 3.6 3.5 - 5.2 mmol/L    Chloride 102 98 - 107 mmol/L    CO2 24.0 22.0 - 29.0 mmol/L    Calcium 9.8 8.6 - 10.5 mg/dL    Total Protein 7.3 6.0 - 8.5 g/dL    Albumin 4.4 3.5 - 5.2 g/dL    ALT (SGPT) 30 1 - 41 U/L    AST (SGOT) 24 1 - 40 U/L    Alkaline Phosphatase 72 39 - 117 U/L    Total Bilirubin 0.3 0.0 - 1.2 mg/dL    Globulin 2.9 gm/dL    A/G Ratio 1.5 g/dL    BUN/Creatinine Ratio 13.8 7.0 - 25.0    Anion Gap 14.0 5.0 - 15.0 mmol/L    eGFR 69.5 >60.0 mL/min/1.73   Protime-INR    Collection Time: 09/28/23  2:03 PM    Specimen: Blood   Result Value Ref Range    Protime 10.5  9.6 - 11.7 Seconds    INR 0.96 0.93 - 1.10   aPTT    Collection Time: 09/28/23  2:03 PM    Specimen: Blood   Result Value Ref Range    PTT 20.2 (L) 24.0 - 31.0 seconds   Single High Sensitivity Troponin T    Collection Time: 09/28/23  2:03 PM    Specimen: Blood   Result Value Ref Range    HS Troponin T 10 <15 ng/L   CBC Auto Differential    Collection Time: 09/28/23  2:03 PM    Specimen: Blood   Result Value Ref Range    WBC 10.20 3.40 - 10.80 10*3/mm3    RBC 4.48 4.14 - 5.80 10*6/mm3    Hemoglobin 14.6 13.0 - 17.7 g/dL    Hematocrit 43.2 37.5 - 51.0 %    MCV 96.4 79.0 - 97.0 fL    MCH 32.7 26.6 - 33.0 pg    MCHC 33.9 31.5 - 35.7 g/dL    RDW 13.8 12.3 - 15.4 %    RDW-SD 45.9 37.0 - 54.0 fl    MPV 7.7 6.0 - 12.0 fL    Platelets 359 140 - 450 10*3/mm3    Neutrophil % 38.9 (L) 42.7 - 76.0 %    Lymphocyte % 45.7 (H) 19.6 - 45.3 %    Monocyte % 11.9 5.0 - 12.0 %    Eosinophil % 2.9 0.3 - 6.2 %    Basophil % 0.6 0.0 - 1.5 %    Neutrophils, Absolute 4.00 1.70 - 7.00 10*3/mm3    Lymphocytes, Absolute 4.70 (H) 0.70 - 3.10 10*3/mm3    Monocytes, Absolute 1.20 (H) 0.10 - 0.90 10*3/mm3    Eosinophils, Absolute 0.30 0.00 - 0.40 10*3/mm3    Basophils, Absolute 0.10 0.00 - 0.20 10*3/mm3    nRBC 0.1 0.0 - 0.2 /100 WBC   POC Glucose Once    Collection Time: 09/28/23  2:25 PM    Specimen: Blood   Result Value Ref Range    Glucose 115 (H) 70 - 105 mg/dL   POC Glucose Once    Collection Time: 09/28/23  4:51 PM    Specimen: Blood   Result Value Ref Range    Glucose 129 70 - 130 mg/dL   POC Glucose Once    Collection Time: 09/29/23 12:34 AM    Specimen: Blood   Result Value Ref Range    Glucose 144 (H) 70 - 130 mg/dL   Basic Metabolic Panel    Collection Time: 09/29/23  3:11 AM    Specimen: Blood   Result Value Ref Range    Glucose 137 (H) 65 - 99 mg/dL    BUN 14 8 - 23 mg/dL    Creatinine 0.93 0.76 - 1.27 mg/dL    Sodium 138 136 - 145 mmol/L    Potassium 4.8 3.5 - 5.2 mmol/L    Chloride 107 98 - 107 mmol/L    CO2 19.3 (L) 22.0 - 29.0  mmol/L    Calcium 8.5 (L) 8.6 - 10.5 mg/dL    BUN/Creatinine Ratio 15.1 7.0 - 25.0    Anion Gap 11.7 5.0 - 15.0 mmol/L    eGFR 90.6 >60.0 mL/min/1.73   CBC (No Diff)    Collection Time: 09/29/23  3:11 AM    Specimen: Blood   Result Value Ref Range    WBC 11.10 (H) 3.40 - 10.80 10*3/mm3    RBC 4.09 (L) 4.14 - 5.80 10*6/mm3    Hemoglobin 13.4 13.0 - 17.7 g/dL    Hematocrit 39.6 37.5 - 51.0 %    MCV 96.8 79.0 - 97.0 fL    MCH 32.8 26.6 - 33.0 pg    MCHC 33.8 31.5 - 35.7 g/dL    RDW 13.7 12.3 - 15.4 %    RDW-SD 49.0 37.0 - 54.0 fl    MPV 9.5 6.0 - 12.0 fL    Platelets 312 140 - 450 10*3/mm3   Lipid Panel    Collection Time: 09/29/23  3:11 AM    Specimen: Blood   Result Value Ref Range    Total Cholesterol 183 0 - 200 mg/dL    Triglycerides 63 0 - 150 mg/dL    HDL Cholesterol 46 40 - 60 mg/dL    LDL Cholesterol  125 (H) 0 - 100 mg/dL    VLDL Cholesterol 12 5 - 40 mg/dL    LDL/HDL Ratio 2.70    POC Glucose Once    Collection Time: 09/29/23  6:08 AM    Specimen: Blood   Result Value Ref Range    Glucose 118 70 - 130 mg/dL   POC Glucose Once    Collection Time: 09/29/23 12:24 PM    Specimen: Blood   Result Value Ref Range    Glucose 117 70 - 130 mg/dL   POC Glucose Once    Collection Time: 09/29/23  6:14 PM    Specimen: Blood   Result Value Ref Range    Glucose 134 (H) 70 - 130 mg/dL   POC Glucose Once    Collection Time: 09/30/23 12:04 AM    Specimen: Blood   Result Value Ref Range    Glucose 124 70 - 130 mg/dL   Hemoglobin A1c    Collection Time: 09/30/23  3:13 AM    Specimen: Blood   Result Value Ref Range    Hemoglobin A1C 6.10 (H) 4.80 - 5.60 %   Adult Transthoracic Echo Complete w/ Color, Spectral and Contrast if Necessary Per Protocol    Collection Time: 09/30/23  9:56 AM   Result Value Ref Range    BH CV ECHO SHUNT ASSESSMENT PERFORMED (HIDDEN SCRIPTING) 1     EF(MOD-bp) 50.9 %    LVIDd 3.7 cm    LVIDs 2.6 cm    IVSd 1.40 cm    LVPWd 1.40 cm    FS 29.7 %    IVS/LVPW 1.00 cm    ESV(cubed) 17.6 ml    EDV(cubed) 50.7  ml    LVOT area 2.27 cm2    LV mass(C)d 186.9 grams    LVOT diam 1.70 cm    EDV(MOD-sp2) 112.0 ml    EDV(MOD-sp4) 107.0 ml    ESV(MOD-sp2) 53.0 ml    ESV(MOD-sp4) 52.0 ml    SV(MOD-sp2) 59.0 ml    SV(MOD-sp4) 55.0 ml    EF(MOD-sp2) 52.7 %    EF(MOD-sp4) 51.4 %    MV E max clif 59.1 cm/sec    MV A max clif 68.9 cm/sec    MV dec time 0.20 sec    MV E/A 0.86     Pulm A Revs Dur 0.11 sec    MV A dur 0.14 sec    LA ESV Index (BP) 20.0 ml/m2    Med Peak E' Clif 4.9 cm/sec    Lat Peak E' Clif 14.8 cm/sec    Avg E/e' ratio 6.00     SV(LVOT) 55.4 ml    SV(RVOT) 74.1 ml    Qp/Qs 1.34     RV Base 3.2 cm    RV Mid 3.2 cm    RV Length 7.6 cm    RV S' 11.7 cm/sec    Pulm Sys Clif 26.2 cm/sec    Pulm Walter Clif 27.1 cm/sec    Pulm S/D 0.97     Pulm A Revs Clif 30.9 cm/sec    LV V1 max 94.2 cm/sec    LV V1 max PG 3.5 mmHg    LV V1 mean PG 2.00 mmHg    LV V1 VTI 24.4 cm    Ao pk clif 151.0 cm/sec    Ao max PG 9.1 mmHg    Ao mean PG 5.0 mmHg    Ao V2 VTI 30.8 cm    FAISAL(I,D) 1.80 cm2    AI P1/2t 671.0 msec    MV max PG 4.2 mmHg    MV mean PG 1.00 mmHg    MV V2 VTI 29.7 cm    MV P1/2t 60.8 msec    MVA(P1/2t) 3.6 cm2    MVA(VTI) 1.86 cm2    MV dec slope 525.0 cm/sec2    MR max clif 163.0 cm/sec    MR max PG 10.6 mmHg    TR max clif 109.0 cm/sec    TR max PG 4.8 mmHg    RVOT diam 2.20 cm    RV V1 max PG 3.1 mmHg    RV V1 max 87.5 cm/sec    RV V1 VTI 19.5 cm    PA V2 max 109.0 cm/sec    PA acc time 0.10 sec    Ao root diam 3.2 cm    ACS 1.50 cm    Sinus 3.5 cm    STJ 2.42 cm    Dimensionless Index 0.80 (DI)    Ascending aorta 2.8 cm    RVSP(TR) 8 mmHg    RAP systole 3 mmHg   POC Glucose Once    Collection Time: 09/30/23 12:38 PM    Specimen: Blood   Result Value Ref Range    Glucose 118 70 - 130 mg/dL   Renal Function Panel    Collection Time: 10/01/23  5:38 AM    Specimen: Blood   Result Value Ref Range    Glucose 121 (H) 65 - 99 mg/dL    BUN 19 8 - 23 mg/dL    Creatinine 1.06 0.76 - 1.27 mg/dL    Sodium 139 136 - 145 mmol/L    Potassium 3.7 3.5 -  "5.2 mmol/L    Chloride 104 98 - 107 mmol/L    CO2 21.1 (L) 22.0 - 29.0 mmol/L    Calcium 8.8 8.6 - 10.5 mg/dL    Albumin 3.9 3.5 - 5.2 g/dL    Phosphorus 2.5 2.5 - 4.5 mg/dL    Anion Gap 13.9 5.0 - 15.0 mmol/L    BUN/Creatinine Ratio 17.9 7.0 - 25.0    eGFR 77.4 >60.0 mL/min/1.73   CBC (No Diff)    Collection Time: 10/01/23  5:38 AM    Specimen: Blood   Result Value Ref Range    WBC 12.30 (H) 3.40 - 10.80 10*3/mm3    RBC 3.99 (L) 4.14 - 5.80 10*6/mm3    Hemoglobin 13.0 13.0 - 17.7 g/dL    Hematocrit 37.7 37.5 - 51.0 %    MCV 94.5 79.0 - 97.0 fL    MCH 32.6 26.6 - 33.0 pg    MCHC 34.5 31.5 - 35.7 g/dL    RDW 13.1 12.3 - 15.4 %    RDW-SD 45.3 37.0 - 54.0 fl    MPV 9.7 6.0 - 12.0 fL    Platelets 310 140 - 450 10*3/mm3         Review of Systems    Objective     /86   Pulse 78   Temp 97.8 °F (36.6 °C) (Infrared)   Ht 167.6 cm (66\")   Wt 79.8 kg (176 lb)   SpO2 97%   BMI 28.41 kg/m²     Physical Exam  Vitals and nursing note reviewed.   Constitutional:       Appearance: He is obese.   HENT:      Head: Normocephalic.      Right Ear: External ear normal.      Left Ear: External ear normal.      Nose: Nose normal.      Mouth/Throat:      Mouth: Mucous membranes are moist.   Eyes:      Pupils: Pupils are equal, round, and reactive to light.   Cardiovascular:      Rate and Rhythm: Normal rate and regular rhythm.      Pulses: Normal pulses.      Heart sounds: Normal heart sounds.   Pulmonary:      Effort: Pulmonary effort is normal.      Breath sounds: Normal breath sounds.   Abdominal:      General: Bowel sounds are normal.      Palpations: Abdomen is soft.   Musculoskeletal:        Arms:       Cervical back: Decreased range of motion.      Thoracic back: No swelling or edema. Decreased range of motion.      Comments: No pain with squatting  Pain with twisting movement only.  Can touch toes no pain.   No rashes.   Straight leg test neg both legs.    Skin:     General: Skin is warm.   Neurological:      General: No " focal deficit present.      Mental Status: He is alert and oriented to person, place, and time.   Psychiatric:         Mood and Affect: Mood normal.         Result Review :                Assessment & Plan    Diagnoses and all orders for this visit:    1. Thoracic spine pain (Primary)  -     Cancel: Ambulatory Referral to Physical Therapy Evaluate and treat  -     Ambulatory Referral to Physical Therapy Evaluate and treat    2. Need for vaccination  -     Pneumococcal Conjugate Vaccine 20-Valent (PCV20)    3. Essential hypertension  -     Lipid Panel; Future  -     Comprehensive Metabolic Panel; Future    4. Right sided cerebral hemisphere cerebrovascular accident (CVA)  -     Lipid Panel; Future  -     Comprehensive Metabolic Panel; Future    5. Mixed hyperlipidemia  -     Lipid Panel; Future  -     Comprehensive Metabolic Panel; Future    6. Elevated blood sugar  -     Hemoglobin A1c; Future      Patient Instructions   Follow up on testing   Eat healthy exercise   If you don't hear from PT let me know.     Follow Up   Return in about 6 months (around 8/2/2024).    Patient was given instructions and counseling regarding his condition or for health maintenance advice. Please see specific information pulled into the AVS if appropriate.     Cherelle Vila, APRN    02/02/24

## 2024-02-28 RX ORDER — MONTELUKAST SODIUM 10 MG/1
10 TABLET ORAL NIGHTLY
Qty: 90 TABLET | Refills: 1 | Status: SHIPPED | OUTPATIENT
Start: 2024-02-28

## 2024-03-01 ENCOUNTER — HOSPITAL ENCOUNTER (OUTPATIENT)
Dept: CARDIOLOGY | Facility: HOSPITAL | Age: 69
Discharge: HOME | End: 2024-03-01
Payer: MEDICARE

## 2024-03-01 DIAGNOSIS — I65.21 ICAO (INTERNAL CAROTID ARTERY OCCLUSION), RIGHT: ICD-10-CM

## 2024-03-01 LAB
BH CV RIGHT CCA HIDDEN LRR: 1 CM/S
BH CV VAS CAROTID RIGHT DISTAL STENT EDV: 21.6 CM/S
BH CV VAS CAROTID RIGHT DISTAL STENT PSV: 65.6 CM/S
BH CV VAS CAROTID RIGHT DISTAL TO STENT NATIVE VESSEL E: 19.5 CM/S
BH CV VAS CAROTID RIGHT DISTAL TO STENT PSV: 54.6 CM/S
BH CV VAS CAROTID RIGHT MID STENT EDV: 20.8 CM/S
BH CV VAS CAROTID RIGHT MID STENT PSV: 70.7 CM/S
BH CV VAS CAROTID RIGHT PROXIMAL STENT EDV: 18.9 CM/S
BH CV VAS CAROTID RIGHT PROXIMAL STENT PSV: 73.1 CM/S
BH CV VAS CAROTID RIGHT STENT NATIVE VESSEL PROXIMAL EDV: 16.9 CM/S
BH CV VAS CAROTID RIGHT STENT NATIVE VESSEL PROXIMAL PS: 65.2 CM/S
BH CV XLRA MEAS LEFT DIST CCA EDV: -30.2 CM/SEC
BH CV XLRA MEAS LEFT DIST CCA PSV: -85.6 CM/SEC
BH CV XLRA MEAS LEFT DIST ICA EDV: -20.4 CM/SEC
BH CV XLRA MEAS LEFT DIST ICA PSV: -53 CM/SEC
BH CV XLRA MEAS LEFT ICA/CCA RATIO: 0.84
BH CV XLRA MEAS LEFT MID ICA EDV: -16 CM/SEC
BH CV XLRA MEAS LEFT MID ICA PSV: -46.3 CM/SEC
BH CV XLRA MEAS LEFT PROX CCA EDV: 17.6 CM/SEC
BH CV XLRA MEAS LEFT PROX CCA PSV: 54.8 CM/SEC
BH CV XLRA MEAS LEFT PROX ECA EDV: -32.7 CM/SEC
BH CV XLRA MEAS LEFT PROX ECA PSV: -152 CM/SEC
BH CV XLRA MEAS LEFT PROX ICA EDV: -27.1 CM/SEC
BH CV XLRA MEAS LEFT PROX ICA PSV: -71.5 CM/SEC
BH CV XLRA MEAS LEFT PROX SCLA PSV: 69.7 CM/SEC
BH CV XLRA MEAS LEFT VERTEBRAL A EDV: -9 CM/SEC
BH CV XLRA MEAS LEFT VERTEBRAL A PSV: -30.2 CM/SEC
BH CV XLRA MEAS RIGHT DIST CCA EDV: 18.9 CM/SEC
BH CV XLRA MEAS RIGHT DIST CCA PSV: 73.1 CM/SEC
BH CV XLRA MEAS RIGHT DIST ICA EDV: -21.9 CM/SEC
BH CV XLRA MEAS RIGHT DIST ICA PSV: -58.4 CM/SEC
BH CV XLRA MEAS RIGHT ICA/CCA RATIO: 0.97
BH CV XLRA MEAS RIGHT MID ICA EDV: -19.5 CM/SEC
BH CV XLRA MEAS RIGHT MID ICA PSV: -54.6 CM/SEC
BH CV XLRA MEAS RIGHT PROX CCA EDV: 16.9 CM/SEC
BH CV XLRA MEAS RIGHT PROX CCA PSV: 65.2 CM/SEC
BH CV XLRA MEAS RIGHT PROX ECA EDV: -24.6 CM/SEC
BH CV XLRA MEAS RIGHT PROX ECA PSV: -171 CM/SEC
BH CV XLRA MEAS RIGHT PROX ICA EDV: -20.8 CM/SEC
BH CV XLRA MEAS RIGHT PROX ICA PSV: -70.7 CM/SEC
BH CV XLRA MEAS RIGHT PROX SCLA PSV: 93.5 CM/SEC
BH CV XLRA MEAS RIGHT VERTEBRAL A EDV: -16.6 CM/SEC
BH CV XLRA MEAS RIGHT VERTEBRAL A PSV: -40.8 CM/SEC
BH CVPROX RIGHT ICA HIDDEN LRR: 1 CM

## 2024-03-01 PROCEDURE — 93880 EXTRACRANIAL BILAT STUDY: CPT

## 2024-03-07 RX ORDER — AMLODIPINE BESYLATE 5 MG/1
5 TABLET ORAL DAILY
Qty: 90 TABLET | Refills: 1 | Status: SHIPPED | OUTPATIENT
Start: 2024-03-07

## 2024-03-27 RX ORDER — TICAGRELOR 90 MG/1
90 TABLET ORAL 2 TIMES DAILY
Qty: 60 TABLET | Refills: 2 | OUTPATIENT
Start: 2024-03-27

## 2024-04-08 NOTE — PROGRESS NOTES
Subjective   Patient ID: Abel Tafoya is a 68 y.o. male is here today for 6 month hospital follow-up after having right internal carotid artery stent placement on 09/28/2024.    Imaging: US of the duplex carotid artery last performed on 03/01/2024    History of Present Illness  68 y.o. male who was transferred to [facility] from [facility] after presenting there with acute R MCA stroke. The stroke service excepted the patient and he went straight to the IR suite on arrival for cerebral angiogram where they found a 90% internal carotid artery stenosis and had stent placement with no residual stenosis. He had TNK with an embolus that resolved.  Patient was at work were he was taking a picture of something with his phone and he just slid down the wall and just could not stand up.  He had no true loss of consciousness, no bowel or bladder incontinence, no seizure activity. He has had no history of prior strokes. He does have hypertension and hyperlipidemia for which he takes treatment and has been pretty well controlled. He is prediabetic.  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  He is a non-smoker.  He underwent a right carotid stent on 9/28/2023 and now returns with his 6-month carotid Doppler ultrasound follow-up to review the results.      The following portions of the patient's history were reviewed and updated as appropriate: He  has a past medical history of Allergic, Broken neck, Colon tumor, Deep vein thrombosis, Hyperlipidemia, Hypertension, and Stroke.  He does not have any pertinent problems on file.  He  has a past surgical history that includes Neck surgery; Tumor excision; Colon biopsy; Skin graft; Appendectomy; and Carotid stent (Right).  His family history includes Diabetes in his brother and mother; Heart disease in his brother and father; Hyperlipidemia in his brother; Hypertension in his brother and mother; Lung cancer in his father; Thyroid disease in his mother.  He  reports that he  has never smoked. He has never been exposed to tobacco smoke. He has never used smokeless tobacco. He reports that he does not drink alcohol and does not use drugs.  Current Outpatient Medications   Medication Sig Dispense Refill    amLODIPine (NORVASC) 5 MG tablet Take 1 tablet by mouth Daily. 90 tablet 1    ascorbic acid (VITAMIN C) 100 MG tablet SM VITAMIN C TABS      aspirin 81 MG chewable tablet Chew 1 tablet Daily.      atorvastatin (LIPITOR) 80 MG tablet Take 1 tablet by mouth Daily for 180 days. 90 tablet 1    Cholecalciferol 1000 units capsule SM VITAMIN D3 CAPS      clobetasol (TEMOVATE) 0.05 % ointment APPLY EXTERNALLY TO THE AFFECTED AREA ON LEGS TWICE DAILY FOR 2 WEEKS.      montelukast (SINGULAIR) 10 MG tablet Take 1 tablet by mouth Every Night. 90 tablet 1    Multiple Vitamins-Minerals (QC MENS DAILY MULTIVITAMIN) tablet QC MENS DAILY MULTIVITAMIN TABS      mupirocin (BACTROBAN) 2 % ointment APPLY EXTERNALLY TO THE AFFECTED AREA ON LEGS TWICE DAILY UNTIL HEALED      tamsulosin (FLOMAX) 0.4 MG capsule 24 hr capsule Take 1 capsule by mouth Daily. 30 capsule 11    fluticasone (Flonase Allergy Relief) 50 MCG/ACT nasal spray 2 sprays into the nostril(s) as directed by provider Daily. (Patient not taking: Reported on 4/9/2024) 1 bottle 3    ticagrelor (Brilinta) 90 MG tablet tablet Take 1 tablet by mouth 2 (Two) Times a Day. (Patient not taking: Reported on 4/9/2024) 60 tablet 2     No current facility-administered medications for this visit.     Current Outpatient Medications on File Prior to Visit   Medication Sig    amLODIPine (NORVASC) 5 MG tablet Take 1 tablet by mouth Daily.    ascorbic acid (VITAMIN C) 100 MG tablet SM VITAMIN C TABS    aspirin 81 MG chewable tablet Chew 1 tablet Daily.    atorvastatin (LIPITOR) 80 MG tablet Take 1 tablet by mouth Daily for 180 days.    Cholecalciferol 1000 units capsule SM VITAMIN D3 CAPS    clobetasol (TEMOVATE) 0.05 % ointment APPLY EXTERNALLY TO THE AFFECTED AREA  "ON LEGS TWICE DAILY FOR 2 WEEKS.    montelukast (SINGULAIR) 10 MG tablet Take 1 tablet by mouth Every Night.    Multiple Vitamins-Minerals (QC MENS DAILY MULTIVITAMIN) tablet QC MENS DAILY MULTIVITAMIN TABS    mupirocin (BACTROBAN) 2 % ointment APPLY EXTERNALLY TO THE AFFECTED AREA ON LEGS TWICE DAILY UNTIL HEALED    tamsulosin (FLOMAX) 0.4 MG capsule 24 hr capsule Take 1 capsule by mouth Daily.    fluticasone (Flonase Allergy Relief) 50 MCG/ACT nasal spray 2 sprays into the nostril(s) as directed by provider Daily. (Patient not taking: Reported on 4/9/2024)    ticagrelor (Brilinta) 90 MG tablet tablet Take 1 tablet by mouth 2 (Two) Times a Day. (Patient not taking: Reported on 4/9/2024)     No current facility-administered medications on file prior to visit.     He has No Known Allergies..    Review of Systems   Constitutional:  Negative for fever.   Eyes:  Negative for visual disturbance.   Neurological:  Negative for dizziness, weakness, light-headedness, numbness and headaches.   Psychiatric/Behavioral:  Negative for confusion and decreased concentration.        Objective     Vitals:    04/09/24 1044   BP: 116/68   Pulse: 71   Resp: 16   SpO2: 95%   Weight: 83.9 kg (185 lb)   Height: 167.6 cm (66\")     Body mass index is 29.86 kg/m².    Tobacco Use: Low Risk  (4/9/2024)    Patient History     Smoking Tobacco Use: Never     Smokeless Tobacco Use: Never     Passive Exposure: Never          Physical Exam  Vitals and nursing note reviewed.   Constitutional:       General: He is not in acute distress.     Appearance: Normal appearance.   Eyes:      Extraocular Movements: Extraocular movements intact.      Conjunctiva/sclera: Conjunctivae normal.   Cardiovascular:      Rate and Rhythm: Normal rate.   Pulmonary:      Effort: Pulmonary effort is normal.   Musculoskeletal:         General: Normal range of motion.      Cervical back: Normal range of motion.   Skin:     General: Skin is warm and dry.   Neurological:      " General: No focal deficit present.      Mental Status: He is alert and oriented to person, place, and time.      Cranial Nerves: No cranial nerve deficit.      Sensory: No sensory deficit.      Motor: No weakness.      Coordination: Coordination normal.      Gait: Gait normal.     Neurologic Exam     Mental Status   Oriented to person, place, and time.           Assessment & Plan   Independent Review of Radiographic Studies:      I personally reviewed the images from the following studies.    The carotid Doppler sonogram shows a widely patent right carotid stent with no intimal hyperplasia or in-stent stenosis suggested.  No significant plaque is seen on the left and no velocity elevations or abnormal waveforms are present to suggest a significant stenosis.  Antegrade flow is present within the vertebral arteries bilaterally.    Medical Decision Makin-year-old male with a severe stenosis of the right internal carotid artery and stroke symptoms which led to hospitalization and subsequent carotid stent placement with resolution of symptoms.  Patient returns today with his 6-month ultrasound follow-up showing no restenosis or intimal hyperplasia and a widely patent right carotid stent.  No progression of disease on the left has occurred.  Patient will return in 1 year for a continued sonographic surveillance of his carotid bifurcations.  He continues to medically manage his cerebrovascular risk factors and reminded to remain tobacco free.    Diagnoses and all orders for this visit:    1. ICAO (internal carotid artery occlusion), right S/P Stent (Primary)    2. Right sided cerebral hemisphere cerebrovascular accident (CVA)    3. Essential hypertension    4. Mixed hyperlipidemia      Return in about 1 year (around 2025) for Recheck, Carotid Doppler Ultrasound.

## 2024-04-09 ENCOUNTER — OFFICE VISIT (OUTPATIENT)
Dept: NEUROSURGERY | Facility: CLINIC | Age: 69
End: 2024-04-09
Payer: MEDICARE

## 2024-04-09 ENCOUNTER — TELEPHONE (OUTPATIENT)
Dept: NEUROSURGERY | Facility: CLINIC | Age: 69
End: 2024-04-09

## 2024-04-09 VITALS
BODY MASS INDEX: 29.73 KG/M2 | DIASTOLIC BLOOD PRESSURE: 68 MMHG | HEIGHT: 66 IN | SYSTOLIC BLOOD PRESSURE: 116 MMHG | OXYGEN SATURATION: 95 % | RESPIRATION RATE: 16 BRPM | HEART RATE: 71 BPM | WEIGHT: 185 LBS

## 2024-04-09 DIAGNOSIS — I65.21 ICAO (INTERNAL CAROTID ARTERY OCCLUSION), RIGHT: Primary | ICD-10-CM

## 2024-04-09 DIAGNOSIS — I63.9 RIGHT SIDED CEREBRAL HEMISPHERE CEREBROVASCULAR ACCIDENT (CVA): ICD-10-CM

## 2024-04-09 DIAGNOSIS — I10 ESSENTIAL HYPERTENSION: ICD-10-CM

## 2024-04-09 DIAGNOSIS — E78.2 MIXED HYPERLIPIDEMIA: ICD-10-CM

## 2024-04-09 PROCEDURE — 1159F MED LIST DOCD IN RCRD: CPT | Performed by: RADIOLOGY

## 2024-04-09 PROCEDURE — 3074F SYST BP LT 130 MM HG: CPT | Performed by: RADIOLOGY

## 2024-04-09 PROCEDURE — 1160F RVW MEDS BY RX/DR IN RCRD: CPT | Performed by: RADIOLOGY

## 2024-04-09 PROCEDURE — 99213 OFFICE O/P EST LOW 20 MIN: CPT | Performed by: RADIOLOGY

## 2024-04-09 PROCEDURE — 3078F DIAST BP <80 MM HG: CPT | Performed by: RADIOLOGY

## 2024-04-09 RX ORDER — ASPIRIN 81 MG/1
1 TABLET, CHEWABLE ORAL DAILY
COMMUNITY
Start: 2024-03-27

## 2024-04-09 RX ORDER — CLOBETASOL PROPIONATE 0.5 MG/G
OINTMENT TOPICAL
COMMUNITY
Start: 2024-04-03

## 2024-04-23 ENCOUNTER — OFFICE VISIT (OUTPATIENT)
Dept: FAMILY MEDICINE CLINIC | Facility: CLINIC | Age: 69
End: 2024-04-23
Payer: MEDICARE

## 2024-04-23 VITALS
WEIGHT: 179.6 LBS | HEART RATE: 83 BPM | OXYGEN SATURATION: 95 % | SYSTOLIC BLOOD PRESSURE: 120 MMHG | BODY MASS INDEX: 28.87 KG/M2 | HEIGHT: 66 IN | TEMPERATURE: 97.8 F | DIASTOLIC BLOOD PRESSURE: 74 MMHG | RESPIRATION RATE: 17 BRPM

## 2024-04-23 DIAGNOSIS — J06.9 UPPER RESPIRATORY TRACT INFECTION, UNSPECIFIED TYPE: Primary | ICD-10-CM

## 2024-04-23 PROCEDURE — 99213 OFFICE O/P EST LOW 20 MIN: CPT | Performed by: NURSE PRACTITIONER

## 2024-04-23 PROCEDURE — 1160F RVW MEDS BY RX/DR IN RCRD: CPT | Performed by: NURSE PRACTITIONER

## 2024-04-23 PROCEDURE — 3078F DIAST BP <80 MM HG: CPT | Performed by: NURSE PRACTITIONER

## 2024-04-23 PROCEDURE — 3074F SYST BP LT 130 MM HG: CPT | Performed by: NURSE PRACTITIONER

## 2024-04-23 PROCEDURE — 1159F MED LIST DOCD IN RCRD: CPT | Performed by: NURSE PRACTITIONER

## 2024-04-23 NOTE — PROGRESS NOTES
Subjective        Abel Tafoya is a 68 y.o. male.     Chief Complaint   Patient presents with    Cough     3 days    Sneezing     3 days    Nasal drainage     3 days       Cough      Patient is here for cough congestion nasal drng. Started Sunday no fever.   Taking nyquil and dayquil  No exposures he is aware of. He had flu shot this year.  Can taste and smell food.       The following portions of the patient's history were reviewed and updated as appropriate: allergies, current medications, past family history, past medical history, past social history, past surgical history and problem list.      Current Outpatient Medications:     amLODIPine (NORVASC) 5 MG tablet, Take 1 tablet by mouth Daily., Disp: 90 tablet, Rfl: 1    ascorbic acid (VITAMIN C) 100 MG tablet, SM VITAMIN C TABS, Disp: , Rfl:     aspirin 81 MG chewable tablet, Chew 1 tablet Daily., Disp: , Rfl:     atorvastatin (LIPITOR) 80 MG tablet, Take 1 tablet by mouth Daily for 180 days., Disp: 90 tablet, Rfl: 1    Cholecalciferol 1000 units capsule,  VITAMIN D3 CAPS, Disp: , Rfl:     clobetasol (TEMOVATE) 0.05 % ointment, APPLY EXTERNALLY TO THE AFFECTED AREA ON LEGS TWICE DAILY FOR 2 WEEKS., Disp: , Rfl:     fluticasone (Flonase Allergy Relief) 50 MCG/ACT nasal spray, 2 sprays into the nostril(s) as directed by provider Daily., Disp: 1 bottle, Rfl: 3    montelukast (SINGULAIR) 10 MG tablet, Take 1 tablet by mouth Every Night., Disp: 90 tablet, Rfl: 1    Multiple Vitamins-Minerals (QC MENS DAILY MULTIVITAMIN) tablet, QC MENS DAILY MULTIVITAMIN TABS, Disp: , Rfl:     mupirocin (BACTROBAN) 2 % ointment, APPLY EXTERNALLY TO THE AFFECTED AREA ON LEGS TWICE DAILY UNTIL HEALED, Disp: , Rfl:     tamsulosin (FLOMAX) 0.4 MG capsule 24 hr capsule, Take 1 capsule by mouth Daily., Disp: 30 capsule, Rfl: 11    Recent Results (from the past 4032 hour(s))   Lipid Panel    Collection Time: 02/02/24  2:36 PM    Specimen: Blood   Result Value Ref Range    Total  Cholesterol 143 0 - 200 mg/dL    Triglycerides 85 0 - 150 mg/dL    HDL Cholesterol 47 40 - 60 mg/dL    LDL Cholesterol  80 0 - 100 mg/dL    VLDL Cholesterol 16 5 - 40 mg/dL    LDL/HDL Ratio 1.68    Hemoglobin A1c    Collection Time: 02/02/24  2:36 PM    Specimen: Blood   Result Value Ref Range    Hemoglobin A1C 6.20 (H) 4.80 - 5.60 %   Comprehensive Metabolic Panel    Collection Time: 02/02/24  2:36 PM    Specimen: Blood   Result Value Ref Range    Glucose 84 65 - 99 mg/dL    BUN 23 8 - 23 mg/dL    Creatinine 1.08 0.76 - 1.27 mg/dL    Sodium 142 136 - 145 mmol/L    Potassium 4.5 3.5 - 5.2 mmol/L    Chloride 105 98 - 107 mmol/L    CO2 23.3 22.0 - 29.0 mmol/L    Calcium 9.4 8.6 - 10.5 mg/dL    Total Protein 7.6 6.0 - 8.5 g/dL    Albumin 4.6 3.5 - 5.2 g/dL    ALT (SGPT) 19 1 - 41 U/L    AST (SGOT) 23 1 - 40 U/L    Alkaline Phosphatase 86 39 - 117 U/L    Total Bilirubin 0.6 0.0 - 1.2 mg/dL    Globulin 3.0 gm/dL    A/G Ratio 1.5 g/dL    BUN/Creatinine Ratio 21.3 7.0 - 25.0    Anion Gap 13.7 5.0 - 15.0 mmol/L    eGFR 75.7 >60.0 mL/min/1.73   Duplex Carotid Ultrasound CAR    Collection Time: 03/01/24  2:27 PM   Result Value Ref Range    Prox CCA PSV 65.2 cm/sec    Prox CCA EDV 16.9 cm/sec    Dist CCA PSV 73.1 cm/sec    Dist CCA EDV 18.9 cm/sec    Prox ICA PSV -70.7 cm/sec    Prox ICA EDV -20.8 cm/sec    Mid ICA PSV -54.6 cm/sec    Mid ICA EDV -19.5 cm/sec    Dist ICA PSV -58.4 cm/sec    Dist ICA EDV -21.9 cm/sec    Prox ECA PSV -171.0 cm/sec    Prox ECA EDV -24.6 cm/sec    Vertebral A PSV -40.8 cm/sec    Prox SCLA PSV 93.5 cm/sec    Prox CCA PSV 54.8 cm/sec    Prox CCA EDV 17.6 cm/sec    Dist CCA PSV -85.6 cm/sec    Dist CCA EDV -30.2 cm/sec    Prox ICA PSV -71.5 cm/sec    Prox ICA EDV -27.1 cm/sec    Mid ICA PSV -46.3 cm/sec    Mid ICA EDV -16.0 cm/sec    Dist ICA PSV -53.0 cm/sec    Dist ICA EDV -20.4 cm/sec    Prox ECA PSV -152.0 cm/sec    Prox ECA EDV -32.7 cm/sec    Vertebral A PSV -30.2 cm/sec    Vertebral A EDV -9.0  "cm/sec    Prox SCLA PSV 69.7 cm/sec    Right CCA STENT 1.00 cm/s    BH CVPROX RIGHT ICA HIDDEN LRR 1.00 CM    Rt.native proximal to stent PSV 65.20 cm/s    Rt Prx to Stent EDV 16.90 cm/s    Rt. Proximal Stent PSV 73.10 cm/s    Rt Prx Stent EDV 18.90 cm/s    Rt. Mid Stent PSV 70.70 cm/s    Rt Mid Stent EDV 20.80 cm/s    Rt Distal Stent PSV 65.60 cm/s    Rt. Distal Stent EDV 21.60 cm/s    Rt Dis To Stent PSV 54.60 cm/s    Rt distal to stent EDV 19.50 cm/s    Vertebral A EDV -16.6 cm/sec    ICA/CCA ratio 0.97     ICA/CCA ratio 0.84          Review of Systems   Respiratory:  Positive for cough.        Objective     /74 (BP Location: Left arm, Patient Position: Sitting, Cuff Size: Adult)   Pulse 83   Temp 97.8 °F (36.6 °C) (Oral)   Resp 17   Ht 167.6 cm (65.98\")   Wt 81.5 kg (179 lb 9.6 oz)   SpO2 95%   BMI 29.00 kg/m²     Physical Exam  Vitals and nursing note reviewed.   Constitutional:       Appearance: Normal appearance.   HENT:      Head: Normocephalic.      Right Ear: Tympanic membrane normal.      Left Ear: Tympanic membrane normal.      Nose: Nose normal. Congestion and rhinorrhea present.      Mouth/Throat:      Mouth: Mucous membranes are moist.   Eyes:      Pupils: Pupils are equal, round, and reactive to light.   Cardiovascular:      Rate and Rhythm: Normal rate and regular rhythm.      Pulses: Normal pulses.      Heart sounds: Normal heart sounds.   Pulmonary:      Effort: Pulmonary effort is normal.      Breath sounds: Normal breath sounds.   Abdominal:      Palpations: Abdomen is soft.   Neurological:      General: No focal deficit present.      Mental Status: He is alert and oriented to person, place, and time.   Psychiatric:         Mood and Affect: Mood normal.         Result Review :                Assessment & Plan    There are no diagnoses linked to this encounter.  Patient Instructions   Clean sinuses saline  Drink water take mucinex.   Can use dayquil and nyquil.  Taking singulair " daily  If fever short of air get seen.   Benadryl at night for sleep if a lot of sinus drng.       Follow Up   Return if symptoms worsen or fail to improve, for Next scheduled follow up.    Patient was given instructions and counseling regarding his condition or for health maintenance advice. Please see specific information pulled into the AVS if appropriate.     Cherelle Vila, APRN    04/23/24

## 2024-04-23 NOTE — PATIENT INSTRUCTIONS
Clean sinuses saline  Drink water take mucinex.   Can use dayquil and nyquil.  Taking singulair daily  If fever short of air get seen.   Benadryl at night for sleep if a lot of sinus drng.

## 2024-06-03 RX ORDER — LOVASTATIN 40 MG/1
80 TABLET ORAL NIGHTLY
Qty: 180 TABLET | Refills: 1 | OUTPATIENT
Start: 2024-06-03

## 2024-06-04 RX ORDER — ATORVASTATIN CALCIUM 80 MG/1
80 TABLET, FILM COATED ORAL DAILY
Qty: 90 TABLET | Refills: 1 | Status: SHIPPED | OUTPATIENT
Start: 2024-06-04 | End: 2024-12-01

## 2024-07-12 ENCOUNTER — OFFICE VISIT (OUTPATIENT)
Dept: FAMILY MEDICINE CLINIC | Facility: CLINIC | Age: 69
End: 2024-07-12
Payer: MEDICARE

## 2024-07-12 VITALS
SYSTOLIC BLOOD PRESSURE: 136 MMHG | WEIGHT: 175.8 LBS | DIASTOLIC BLOOD PRESSURE: 78 MMHG | HEART RATE: 85 BPM | OXYGEN SATURATION: 93 % | BODY MASS INDEX: 28.25 KG/M2 | HEIGHT: 66 IN | TEMPERATURE: 98 F | RESPIRATION RATE: 17 BRPM

## 2024-07-12 DIAGNOSIS — E78.2 MIXED HYPERLIPIDEMIA: Chronic | ICD-10-CM

## 2024-07-12 DIAGNOSIS — I15.9 SECONDARY HYPERTENSION: Chronic | ICD-10-CM

## 2024-07-12 DIAGNOSIS — Z00.00 MEDICARE ANNUAL WELLNESS VISIT, SUBSEQUENT: Primary | ICD-10-CM

## 2024-07-12 DIAGNOSIS — R73.9 ELEVATED BLOOD SUGAR: Chronic | ICD-10-CM

## 2024-07-12 DIAGNOSIS — L40.9 PSORIASIS: Chronic | ICD-10-CM

## 2024-07-12 DIAGNOSIS — E55.9 VITAMIN D DEFICIENCY: Chronic | ICD-10-CM

## 2024-07-12 PROCEDURE — 3075F SYST BP GE 130 - 139MM HG: CPT | Performed by: NURSE PRACTITIONER

## 2024-07-12 PROCEDURE — 1126F AMNT PAIN NOTED NONE PRSNT: CPT | Performed by: NURSE PRACTITIONER

## 2024-07-12 PROCEDURE — 99214 OFFICE O/P EST MOD 30 MIN: CPT | Performed by: NURSE PRACTITIONER

## 2024-07-12 PROCEDURE — 3078F DIAST BP <80 MM HG: CPT | Performed by: NURSE PRACTITIONER

## 2024-07-12 PROCEDURE — G0439 PPPS, SUBSEQ VISIT: HCPCS | Performed by: NURSE PRACTITIONER

## 2024-07-12 RX ORDER — ATORVASTATIN CALCIUM 80 MG/1
80 TABLET, FILM COATED ORAL DAILY
Qty: 90 TABLET | Refills: 1 | Status: SHIPPED | OUTPATIENT
Start: 2024-07-12 | End: 2025-01-08

## 2024-07-12 RX ORDER — TOBRAMYCIN AND DEXAMETHASONE 3; 1 MG/ML; MG/ML
SUSPENSION/ DROPS OPHTHALMIC
COMMUNITY
Start: 2024-07-10

## 2024-07-12 RX ORDER — CEPHALEXIN 500 MG/1
1 CAPSULE ORAL EVERY 12 HOURS SCHEDULED
COMMUNITY
Start: 2024-07-10

## 2024-07-12 NOTE — PROGRESS NOTES
The ABCs of the Annual Wellness Visit  Subsequent Medicare Wellness Visit    Subjective    Abel Tafoya is a 68 y.o. male who presents for a Subsequent Medicare Wellness Visit.    The following portions of the patient's history were reviewed and   updated as appropriate: allergies, current medications, past family history, past medical history, past social history, past surgical history, and problem list.    Compared to one year ago, the patient feels his physical   health is better.    Compared to one year ago, the patient feels his mental   health is better.    Recent Hospitalizations:  This patient has had a [facility] admission record on file within the last 365 days.    Current Medical Providers:  Patient Care Team:  Cherelle Vila APRN as PCP - General (Nurse Practitioner)  Gene Bassett APRN as Nurse Practitioner (Neurosurgery)  Grady Aguila MD as Consulting Physician (Urology)  Grady Flannery MD as Consulting Physician (Neurosurgery)    Outpatient Medications Prior to Visit   Medication Sig Dispense Refill    amLODIPine (NORVASC) 5 MG tablet Take 1 tablet by mouth Daily. 90 tablet 1    ascorbic acid (VITAMIN C) 100 MG tablet SM VITAMIN C TABS      aspirin 81 MG chewable tablet Chew 1 tablet Daily.      cephalexin (KEFLEX) 500 MG capsule Take 1 capsule by mouth Every 12 (Twelve) Hours.      Cholecalciferol 1000 units capsule SM VITAMIN D3 CAPS      clobetasol (TEMOVATE) 0.05 % ointment APPLY EXTERNALLY TO THE AFFECTED AREA ON LEGS TWICE DAILY FOR 2 WEEKS.      fluticasone (Flonase Allergy Relief) 50 MCG/ACT nasal spray 2 sprays into the nostril(s) as directed by provider Daily. 1 bottle 3    montelukast (SINGULAIR) 10 MG tablet Take 1 tablet by mouth Every Night. 90 tablet 1    Multiple Vitamins-Minerals (QC MENS DAILY MULTIVITAMIN) tablet QC MENS DAILY MULTIVITAMIN TABS      mupirocin (BACTROBAN) 2 % ointment APPLY EXTERNALLY TO THE AFFECTED AREA ON LEGS TWICE DAILY UNTIL HEALED       tamsulosin (FLOMAX) 0.4 MG capsule 24 hr capsule Take 1 capsule by mouth Daily. 30 capsule 11    tobramycin-dexAMETHasone (TOBRADEX) 0.3-0.1 % ophthalmic suspension SHAKE LIQUID AND INSTILL 1 DROP IN LEFT EYE FOUR TIMES DAILY FOR 1 WEEK      atorvastatin (LIPITOR) 80 MG tablet Take 1 tablet by mouth Daily for 180 days. 90 tablet 1     No facility-administered medications prior to visit.       No opioid medication identified on active medication list. I have reviewed chart for other potential  high risk medication/s and harmful drug interactions in the elderly.        Aspirin is on active medication list. Aspirin use is indicated based on review of current medical condition/s. Pros and cons of this therapy have been discussed today. Benefits of this medication outweigh potential harm.  Patient has been encouraged to continue taking this medication.  .      Patient Active Problem List   Diagnosis    Encounter for screening    Medicare annual wellness visit, subsequent    History of colonic polyps    Hyperlipidemia    Prostate nodule    Flu vaccine need    Weakness    Vitamin D deficiency    History of elevated PSA    Secondary hypertension    Need for hepatitis C screening test    Psoriasis    Right sided cerebral hemisphere cerebrovascular accident (CVA)    Acute ischemic right MCA stroke    ICAO (internal carotid artery occlusion), right    Essential hypertension    BPH with obstruction/lower urinary tract symptoms    Tinea    Elevated blood sugar    Other constipation    Overweight with body mass index (BMI) of 28 to 28.9 in adult     Advance Care Planning   Advance Care Planning     Advance Directive is not on file.  ACP discussion was held with the patient during this visit. Patient has an advance directive (not in EMR), copy requested.     Objective    Vitals:    07/12/24 1400   BP: 136/78   BP Location: Left arm   Patient Position: Sitting   Cuff Size: Adult   Pulse: 85   Resp: 17   Temp: 98 °F (36.7 °C)  "  TempSrc: Oral   SpO2: 93%   Weight: 79.7 kg (175 lb 12.8 oz)   Height: 167.6 cm (65.98\")     Estimated body mass index is 28.39 kg/m² as calculated from the following:    Height as of this encounter: 167.6 cm (65.98\").    Weight as of this encounter: 79.7 kg (175 lb 12.8 oz).           Does the patient have evidence of cognitive impairment? No          HEALTH RISK ASSESSMENT    Smoking Status:  Social History     Tobacco Use   Smoking Status Never    Passive exposure: Never   Smokeless Tobacco Never     Alcohol Consumption:  Social History     Substance and Sexual Activity   Alcohol Use No     Fall Risk Screen:    EMMIEADI Fall Risk Assessment was completed, and patient is at LOW risk for falls.Assessment completed on:2024    Depression Screenin/12/2024     1:59 PM   PHQ-2/PHQ-9 Depression Screening   Little Interest or Pleasure in Doing Things 0-->not at all   Feeling Down, Depressed or Hopeless 0-->not at all   PHQ-9: Brief Depression Severity Measure Score 0       Health Habits and Functional and Cognitive Screenin/8/2024    10:50 PM   Functional & Cognitive Status   Do you have difficulty preparing food and eating? No   Do you have difficulty bathing yourself, getting dressed or grooming yourself? No   Do you have difficulty using the toilet? No   Do you have difficulty moving around from place to place? No   Do you have trouble with steps or getting out of a bed or a chair? No   Current Diet Other   Dental Exam Up to date   Eye Exam Up to date   Exercise (times per week) 1 times per week   Current Exercises Include Walking   Do you need help using the phone?  No   Are you deaf or do you have serious difficulty hearing?  No   Do you need help to go to places out of walking distance? No   Do you need help shopping? No   Do you need help preparing meals?  No   Do you need help with housework?  No   Do you need help with laundry? No   Do you need help taking your medications? No   Do you need " help managing money? No   Do you ever drive or ride in a car without wearing a seat belt? No   Have you felt unusual stress, anger or loneliness in the last month? No   Who do you live with? Spouse   If you need help, do you have trouble finding someone available to you? No   Have you been bothered in the last four weeks by sexual problems? No   Do you have difficulty concentrating, remembering or making decisions? No       Age-appropriate Screening Schedule:  Refer to the list below for future screening recommendations based on patient's age, sex and/or medical conditions. Orders for these recommended tests are listed in the plan section. The patient has been provided with a written plan.    Health Maintenance   Topic Date Due    Hepatitis B (1 of 3 - Risk 3-dose series) Never done    COVID-19 Vaccine (5 - 2023-24 season) 09/01/2023    INFLUENZA VACCINE  08/01/2024    LIPID PANEL  02/02/2025    BMI FOLLOWUP  02/02/2025    ANNUAL WELLNESS VISIT  07/12/2025    TDAP/TD VACCINES (2 - Tdap) 04/24/2029    COLORECTAL CANCER SCREENING  06/28/2029    HEPATITIS C SCREENING  Completed    Pneumococcal Vaccine 65+  Completed    ZOSTER VACCINE  Completed                  CMS Preventative Services Quick Reference  Risk Factors Identified During Encounter  Hearing Problem:  hearing aids  The above risks/problems have been discussed with the patient.  Pertinent information has been shared with the patient in the After Visit Summary.  An After Visit Summary and PPPS were made available to the patient.    Follow Up:   Next Medicare Wellness visit to be scheduled in 1 year.       Additional E&M Note during same encounter follows:  Patient has multiple medical problems which are significant and separately identifiable that require additional work above and beyond the Medicare Wellness Visit.      Chief Complaint  Medicare Wellness-Initial Visit    Subjective        HPI  Abel Tafoya is also being seen today for management of his  "chronic medical problems:   Hypertension, hyperlipidemia ,allergies, BPH, ICAO elevated blood sugar.     ICAO stable followed by neurology. Taking asprin 81 mg daily has hypertension.  Ran out of his cholesterol meds 2 months ago.     Hypertension: amlodipine 5 mg daily has hyperlipidemia. He monitors this at home. 106/62 104/57 105/63 114/56 118/63 110/50.    Hyperlipidemia ran out of atorvastatin 2 months ago.    Allergies: singulair 10 mg daily,   Flonase daily.     Currently taking antibiotics for corneal abrasion.    Vit d def  taking vit d.         Review of Systems   HENT:  Positive for hearing loss.         Current with dental  Has hearing aids   Eyes:         Eye exam coning up.   Respiratory:  Negative for cough and wheezing.    Gastrointestinal:  Negative for abdominal pain, blood in stool, constipation and diarrhea.   Genitourinary:  Negative for decreased urine volume, difficulty urinating and flank pain.        Followed by urology   Skin:  Positive for rash.        Psoriaiss on knees and elbows   Allergic/Immunologic: Negative.    Neurological:  Positive for tremors. Negative for seizures, light-headedness and confusion.        Shakes when working   Hematological: Negative.    Psychiatric/Behavioral:  Negative for decreased concentration, dysphoric mood, hallucinations and suicidal ideas.        Objective   Vital Signs:  /78 (BP Location: Left arm, Patient Position: Sitting, Cuff Size: Adult)   Pulse 85   Temp 98 °F (36.7 °C) (Oral)   Resp 17   Ht 167.6 cm (65.98\")   Wt 79.7 kg (175 lb 12.8 oz)   SpO2 93%   BMI 28.39 kg/m²     Physical Exam  Vitals and nursing note reviewed.   Constitutional:       Appearance: He is obese.   HENT:      Head: Normocephalic.      Right Ear: Tympanic membrane normal.      Left Ear: Tympanic membrane normal.      Nose: Nose normal.      Mouth/Throat:      Mouth: Mucous membranes are moist.   Eyes:      Pupils: Pupils are equal, round, and reactive to light. "   Neck:      Vascular: No carotid bruit.   Cardiovascular:      Rate and Rhythm: Normal rate and regular rhythm.      Pulses: Normal pulses.      Heart sounds: Normal heart sounds.   Pulmonary:      Effort: Pulmonary effort is normal.      Breath sounds: Normal breath sounds.   Abdominal:      General: Bowel sounds are normal.      Palpations: Abdomen is soft.   Musculoskeletal:      Cervical back: Neck supple.   Skin:     General: Skin is warm.      Capillary Refill: Capillary refill takes less than 2 seconds.      Findings: Rash present.      Comments: Psoriasis on elbows and knees   Neurological:      General: No focal deficit present.      Mental Status: He is alert and oriented to person, place, and time.   Psychiatric:         Mood and Affect: Mood normal.         Thought Content: Thought content normal.                         Assessment and Plan   Diagnoses and all orders for this visit:    1. Medicare annual wellness visit, subsequent (Primary)  Comments:  need copy of advanced care directive.    2. Secondary hypertension  Comments:  stable  Orders:  -     Lipid Panel; Future  -     Comprehensive Metabolic Panel; Future    3. Mixed hyperlipidemia  Comments:  stable  Orders:  -     Lipid Panel; Future  -     Comprehensive Metabolic Panel; Future    4. Elevated blood sugar  Comments:  A1C ordered  Orders:  -     Hemoglobin A1c; Future    5. Vitamin D deficiency  Comments:  labs ordered  Orders:  -     Vitamin D,25-Hydroxy; Future    6. Psoriasis  Comments:  stableusing clobetasol    Other orders  -     atorvastatin (LIPITOR) 80 MG tablet; Take 1 tablet by mouth Daily for 180 days.  Dispense: 90 tablet; Refill: 1             Follow Up   Return in about 6 months (around 1/12/2025).  Patient was given instructions and counseling regarding his condition or for health maintenance advice. Please see specific information pulled into the AVS if appropriate.

## 2024-07-12 NOTE — PROGRESS NOTES
Subjective        Abel Tafoya is a 68 y.o. male.     Chief Complaint   Patient presents with    Medicare Wellness-Initial Visit       History of Present Illness    The following portions of the patient's history were reviewed and updated as appropriate: allergies, current medications, past family history, past medical history, past social history, past surgical history and problem list.      Current Outpatient Medications:     amLODIPine (NORVASC) 5 MG tablet, Take 1 tablet by mouth Daily., Disp: 90 tablet, Rfl: 1    ascorbic acid (VITAMIN C) 100 MG tablet, SM VITAMIN C TABS, Disp: , Rfl:     aspirin 81 MG chewable tablet, Chew 1 tablet Daily., Disp: , Rfl:     atorvastatin (LIPITOR) 80 MG tablet, Take 1 tablet by mouth Daily for 180 days., Disp: 90 tablet, Rfl: 1    cephalexin (KEFLEX) 500 MG capsule, Take 1 capsule by mouth Every 12 (Twelve) Hours., Disp: , Rfl:     Cholecalciferol 1000 units capsule, SM VITAMIN D3 CAPS, Disp: , Rfl:     clobetasol (TEMOVATE) 0.05 % ointment, APPLY EXTERNALLY TO THE AFFECTED AREA ON LEGS TWICE DAILY FOR 2 WEEKS., Disp: , Rfl:     fluticasone (Flonase Allergy Relief) 50 MCG/ACT nasal spray, 2 sprays into the nostril(s) as directed by provider Daily., Disp: 1 bottle, Rfl: 3    montelukast (SINGULAIR) 10 MG tablet, Take 1 tablet by mouth Every Night., Disp: 90 tablet, Rfl: 1    Multiple Vitamins-Minerals (QC MENS DAILY MULTIVITAMIN) tablet, QC MENS DAILY MULTIVITAMIN TABS, Disp: , Rfl:     mupirocin (BACTROBAN) 2 % ointment, APPLY EXTERNALLY TO THE AFFECTED AREA ON LEGS TWICE DAILY UNTIL HEALED, Disp: , Rfl:     tamsulosin (FLOMAX) 0.4 MG capsule 24 hr capsule, Take 1 capsule by mouth Daily., Disp: 30 capsule, Rfl: 11    tobramycin-dexAMETHasone (TOBRADEX) 0.3-0.1 % ophthalmic suspension, SHAKE LIQUID AND INSTILL 1 DROP IN LEFT EYE FOUR TIMES DAILY FOR 1 WEEK, Disp: , Rfl:     Recent Results (from the past 4032 hour(s))   Lipid Panel    Collection Time: 02/02/24  2:36 PM     Specimen: Blood   Result Value Ref Range    Total Cholesterol 143 0 - 200 mg/dL    Triglycerides 85 0 - 150 mg/dL    HDL Cholesterol 47 40 - 60 mg/dL    LDL Cholesterol  80 0 - 100 mg/dL    VLDL Cholesterol 16 5 - 40 mg/dL    LDL/HDL Ratio 1.68    Hemoglobin A1c    Collection Time: 02/02/24  2:36 PM    Specimen: Blood   Result Value Ref Range    Hemoglobin A1C 6.20 (H) 4.80 - 5.60 %   Comprehensive Metabolic Panel    Collection Time: 02/02/24  2:36 PM    Specimen: Blood   Result Value Ref Range    Glucose 84 65 - 99 mg/dL    BUN 23 8 - 23 mg/dL    Creatinine 1.08 0.76 - 1.27 mg/dL    Sodium 142 136 - 145 mmol/L    Potassium 4.5 3.5 - 5.2 mmol/L    Chloride 105 98 - 107 mmol/L    CO2 23.3 22.0 - 29.0 mmol/L    Calcium 9.4 8.6 - 10.5 mg/dL    Total Protein 7.6 6.0 - 8.5 g/dL    Albumin 4.6 3.5 - 5.2 g/dL    ALT (SGPT) 19 1 - 41 U/L    AST (SGOT) 23 1 - 40 U/L    Alkaline Phosphatase 86 39 - 117 U/L    Total Bilirubin 0.6 0.0 - 1.2 mg/dL    Globulin 3.0 gm/dL    A/G Ratio 1.5 g/dL    BUN/Creatinine Ratio 21.3 7.0 - 25.0    Anion Gap 13.7 5.0 - 15.0 mmol/L    eGFR 75.7 >60.0 mL/min/1.73   Duplex Carotid Ultrasound CAR    Collection Time: 03/01/24  2:27 PM   Result Value Ref Range    Prox CCA PSV 65.2 cm/sec    Prox CCA EDV 16.9 cm/sec    Dist CCA PSV 73.1 cm/sec    Dist CCA EDV 18.9 cm/sec    Prox ICA PSV -70.7 cm/sec    Prox ICA EDV -20.8 cm/sec    Mid ICA PSV -54.6 cm/sec    Mid ICA EDV -19.5 cm/sec    Dist ICA PSV -58.4 cm/sec    Dist ICA EDV -21.9 cm/sec    Prox ECA PSV -171.0 cm/sec    Prox ECA EDV -24.6 cm/sec    Vertebral A PSV -40.8 cm/sec    Prox SCLA PSV 93.5 cm/sec    Prox CCA PSV 54.8 cm/sec    Prox CCA EDV 17.6 cm/sec    Dist CCA PSV -85.6 cm/sec    Dist CCA EDV -30.2 cm/sec    Prox ICA PSV -71.5 cm/sec    Prox ICA EDV -27.1 cm/sec    Mid ICA PSV -46.3 cm/sec    Mid ICA EDV -16.0 cm/sec    Dist ICA PSV -53.0 cm/sec    Dist ICA EDV -20.4 cm/sec    Prox ECA PSV -152.0 cm/sec    Prox ECA EDV -32.7 cm/sec     "Vertebral A PSV -30.2 cm/sec    Vertebral A EDV -9.0 cm/sec    Prox SCLA PSV 69.7 cm/sec    Right CCA STENT 1.00 cm/s    BH CVPROX RIGHT ICA HIDDEN LRR 1.00 CM    Rt.native proximal to stent PSV 65.20 cm/s    Rt Prx to Stent EDV 16.90 cm/s    Rt. Proximal Stent PSV 73.10 cm/s    Rt Prx Stent EDV 18.90 cm/s    Rt. Mid Stent PSV 70.70 cm/s    Rt Mid Stent EDV 20.80 cm/s    Rt Distal Stent PSV 65.60 cm/s    Rt. Distal Stent EDV 21.60 cm/s    Rt Dis To Stent PSV 54.60 cm/s    Rt distal to stent EDV 19.50 cm/s    Vertebral A EDV -16.6 cm/sec    ICA/CCA ratio 0.97     ICA/CCA ratio 0.84          Review of Systems    Objective     /78 (BP Location: Left arm, Patient Position: Sitting, Cuff Size: Adult)   Pulse 85   Temp 98 °F (36.7 °C) (Oral)   Resp 17   Ht 167.6 cm (65.98\")   Wt 79.7 kg (175 lb 12.8 oz)   SpO2 93%   BMI 28.39 kg/m²     Physical Exam    Result Review :                Assessment & Plan    There are no diagnoses linked to this encounter.  There are no Patient Instructions on file for this visit.    Follow Up   No follow-ups on file.    Patient was given instructions and counseling regarding his condition or for health maintenance advice. Please see specific information pulled into the AVS if appropriate.     Cherelle Vila, APRN    07/12/24      "

## 2024-07-16 ENCOUNTER — LAB (OUTPATIENT)
Dept: LAB | Facility: HOSPITAL | Age: 69
End: 2024-07-16
Payer: MEDICARE

## 2024-07-16 DIAGNOSIS — E78.2 MIXED HYPERLIPIDEMIA: Chronic | ICD-10-CM

## 2024-07-16 DIAGNOSIS — R73.9 ELEVATED BLOOD SUGAR: Chronic | ICD-10-CM

## 2024-07-16 DIAGNOSIS — I15.9 SECONDARY HYPERTENSION: Chronic | ICD-10-CM

## 2024-07-16 DIAGNOSIS — E55.9 VITAMIN D DEFICIENCY: Chronic | ICD-10-CM

## 2024-07-16 LAB
25(OH)D3 SERPL-MCNC: 40.4 NG/ML (ref 30–100)
ALBUMIN SERPL-MCNC: 4.3 G/DL (ref 3.5–5.2)
ALBUMIN/GLOB SERPL: 1.5 G/DL
ALP SERPL-CCNC: 85 U/L (ref 39–117)
ALT SERPL W P-5'-P-CCNC: 31 U/L (ref 1–41)
ANION GAP SERPL CALCULATED.3IONS-SCNC: 11 MMOL/L (ref 5–15)
AST SERPL-CCNC: 29 U/L (ref 1–40)
BILIRUB SERPL-MCNC: 0.4 MG/DL (ref 0–1.2)
BUN SERPL-MCNC: 25 MG/DL (ref 8–23)
BUN/CREAT SERPL: 22.7 (ref 7–25)
CALCIUM SPEC-SCNC: 9.4 MG/DL (ref 8.6–10.5)
CHLORIDE SERPL-SCNC: 105 MMOL/L (ref 98–107)
CHOLEST SERPL-MCNC: 147 MG/DL (ref 0–200)
CO2 SERPL-SCNC: 26 MMOL/L (ref 22–29)
CREAT SERPL-MCNC: 1.1 MG/DL (ref 0.76–1.27)
EGFRCR SERPLBLD CKD-EPI 2021: 74 ML/MIN/1.73
GLOBULIN UR ELPH-MCNC: 2.9 GM/DL
GLUCOSE SERPL-MCNC: 101 MG/DL (ref 65–99)
HBA1C MFR BLD: 5.8 % (ref 4.8–5.6)
HDLC SERPL-MCNC: 45 MG/DL (ref 40–60)
LDLC SERPL CALC-MCNC: 81 MG/DL (ref 0–100)
LDLC/HDLC SERPL: 1.75 {RATIO}
POTASSIUM SERPL-SCNC: 4.3 MMOL/L (ref 3.5–5.2)
PROT SERPL-MCNC: 7.2 G/DL (ref 6–8.5)
SODIUM SERPL-SCNC: 142 MMOL/L (ref 136–145)
TRIGL SERPL-MCNC: 116 MG/DL (ref 0–150)
VLDLC SERPL-MCNC: 21 MG/DL (ref 5–40)

## 2024-07-16 PROCEDURE — 83036 HEMOGLOBIN GLYCOSYLATED A1C: CPT

## 2024-07-16 PROCEDURE — 36415 COLL VENOUS BLD VENIPUNCTURE: CPT

## 2024-07-16 PROCEDURE — 80061 LIPID PANEL: CPT

## 2024-07-16 PROCEDURE — 80053 COMPREHEN METABOLIC PANEL: CPT

## 2024-07-16 PROCEDURE — 82306 VITAMIN D 25 HYDROXY: CPT

## 2024-10-04 ENCOUNTER — OFFICE VISIT (OUTPATIENT)
Dept: FAMILY MEDICINE CLINIC | Facility: CLINIC | Age: 69
End: 2024-10-04
Payer: MEDICARE

## 2024-10-04 VITALS
BODY MASS INDEX: 27.97 KG/M2 | HEART RATE: 73 BPM | WEIGHT: 174 LBS | SYSTOLIC BLOOD PRESSURE: 123 MMHG | TEMPERATURE: 98.4 F | OXYGEN SATURATION: 94 % | RESPIRATION RATE: 16 BRPM | HEIGHT: 66 IN | DIASTOLIC BLOOD PRESSURE: 74 MMHG

## 2024-10-04 DIAGNOSIS — J06.9 VIRAL UPPER RESPIRATORY TRACT INFECTION: Primary | ICD-10-CM

## 2024-10-04 PROCEDURE — 3074F SYST BP LT 130 MM HG: CPT | Performed by: NURSE PRACTITIONER

## 2024-10-04 PROCEDURE — 99213 OFFICE O/P EST LOW 20 MIN: CPT | Performed by: NURSE PRACTITIONER

## 2024-10-04 PROCEDURE — 3078F DIAST BP <80 MM HG: CPT | Performed by: NURSE PRACTITIONER

## 2024-10-04 PROCEDURE — 1126F AMNT PAIN NOTED NONE PRSNT: CPT | Performed by: NURSE PRACTITIONER

## 2024-10-04 RX ORDER — PREDNISONE 10 MG/1
TABLET ORAL
Qty: 20 TABLET | Refills: 0 | Status: SHIPPED | OUTPATIENT
Start: 2024-10-04 | End: 2024-10-12

## 2024-10-04 RX ORDER — FLUTICASONE PROPIONATE 50 UG/1
2 SPRAY, METERED NASAL DAILY
Qty: 18.2 ML | Refills: 3 | Status: SHIPPED | OUTPATIENT
Start: 2024-10-04

## 2024-10-04 NOTE — PROGRESS NOTES
Subjective        Abel Tafoya is a 69 y.o. male.     Chief Complaint   Patient presents with    Sinus Problem     [facility] f/u, still not feeling good.   Going on 2 weeks       Sinus Problem      Patient is here for sinus congestion nose stop every night no fever going on 2 weeks seen [facility] no meds prescribed.   He is cough that just started couple days ago.   Has a lot sinus drng.   He is taking montelukast daily.   He completed flu vaccine week ago and covid vaccine.       The following portions of the patient's history were reviewed and updated as appropriate: allergies, current medications, past family history, past medical history, past social history, past surgical history and problem list.      Current Outpatient Medications:     amLODIPine (NORVASC) 5 MG tablet, Take 1 tablet by mouth Daily., Disp: 90 tablet, Rfl: 1    ascorbic acid (VITAMIN C) 100 MG tablet,  VITAMIN C TABS, Disp: , Rfl:     aspirin 81 MG chewable tablet, Chew 1 tablet Daily., Disp: , Rfl:     atorvastatin (LIPITOR) 80 MG tablet, Take 1 tablet by mouth Daily for 180 days., Disp: 90 tablet, Rfl: 1    Cholecalciferol 1000 units capsule,  VITAMIN D3 CAPS, Disp: , Rfl:     fluticasone (Flonase Allergy Relief) 50 MCG/ACT nasal spray, Administer 2 sprays into the nostril(s) as directed by provider Daily., Disp: 18.2 mL, Rfl: 3    montelukast (SINGULAIR) 10 MG tablet, Take 1 tablet by mouth Every Night., Disp: 90 tablet, Rfl: 1    Multiple Vitamins-Minerals (QC MENS DAILY MULTIVITAMIN) tablet, QC MENS DAILY MULTIVITAMIN TABS, Disp: , Rfl:     tamsulosin (FLOMAX) 0.4 MG capsule 24 hr capsule, Take 1 capsule by mouth Daily., Disp: 30 capsule, Rfl: 11    clobetasol (TEMOVATE) 0.05 % ointment, APPLY EXTERNALLY TO THE AFFECTED AREA ON LEGS TWICE DAILY FOR 2 WEEKS. (Patient not taking: Reported on 10/4/2024), Disp: , Rfl:     mupirocin (BACTROBAN) 2 % ointment, APPLY EXTERNALLY TO THE AFFECTED AREA ON LEGS TWICE DAILY UNTIL HEALED  "(Patient not taking: Reported on 10/4/2024), Disp: , Rfl:     predniSONE (DELTASONE) 10 MG tablet, Take 4 tablets by mouth Daily for 2 days, THEN 3 tablets Daily for 2 days, THEN 2 tablets Daily for 2 days, THEN 1 tablet Daily for 2 days., Disp: 20 tablet, Rfl: 0    Recent Results (from the past 4032 hour(s))   Lipid Panel    Collection Time: 07/16/24  8:13 AM    Specimen: Blood   Result Value Ref Range    Total Cholesterol 147 0 - 200 mg/dL    Triglycerides 116 0 - 150 mg/dL    HDL Cholesterol 45 40 - 60 mg/dL    LDL Cholesterol  81 0 - 100 mg/dL    VLDL Cholesterol 21 5 - 40 mg/dL    LDL/HDL Ratio 1.75    Comprehensive Metabolic Panel    Collection Time: 07/16/24  8:13 AM    Specimen: Blood   Result Value Ref Range    Glucose 101 (H) 65 - 99 mg/dL    BUN 25 (H) 8 - 23 mg/dL    Creatinine 1.10 0.76 - 1.27 mg/dL    Sodium 142 136 - 145 mmol/L    Potassium 4.3 3.5 - 5.2 mmol/L    Chloride 105 98 - 107 mmol/L    CO2 26.0 22.0 - 29.0 mmol/L    Calcium 9.4 8.6 - 10.5 mg/dL    Total Protein 7.2 6.0 - 8.5 g/dL    Albumin 4.3 3.5 - 5.2 g/dL    ALT (SGPT) 31 1 - 41 U/L    AST (SGOT) 29 1 - 40 U/L    Alkaline Phosphatase 85 39 - 117 U/L    Total Bilirubin 0.4 0.0 - 1.2 mg/dL    Globulin 2.9 gm/dL    A/G Ratio 1.5 g/dL    BUN/Creatinine Ratio 22.7 7.0 - 25.0    Anion Gap 11.0 5.0 - 15.0 mmol/L    eGFR 74.0 >60.0 mL/min/1.73   Hemoglobin A1c    Collection Time: 07/16/24  8:13 AM    Specimen: Blood   Result Value Ref Range    Hemoglobin A1C 5.80 (H) 4.80 - 5.60 %   Vitamin D,25-Hydroxy    Collection Time: 07/16/24  8:13 AM    Specimen: Blood   Result Value Ref Range    25 Hydroxy, Vitamin D 40.4 30.0 - 100.0 ng/ml         Review of Systems    Objective     /74 (BP Location: Left arm, Patient Position: Sitting, Cuff Size: Large Adult)   Pulse 73   Temp 98.4 °F (36.9 °C) (Oral)   Resp 16   Ht 167.6 cm (66\")   Wt 78.9 kg (174 lb)   SpO2 94%   BMI 28.08 kg/m²     Physical Exam  Vitals and nursing note reviewed. "   Constitutional:       Appearance: Normal appearance.   HENT:      Head: Normocephalic.      Right Ear: Tympanic membrane and external ear normal.      Left Ear: Tympanic membrane and external ear normal.      Mouth/Throat:      Mouth: Mucous membranes are moist.      Pharynx: Oropharynx is clear. No oropharyngeal exudate or posterior oropharyngeal erythema.   Eyes:      Conjunctiva/sclera: Conjunctivae normal.      Pupils: Pupils are equal, round, and reactive to light.   Cardiovascular:      Rate and Rhythm: Normal rate and regular rhythm.      Pulses: Normal pulses.      Heart sounds: Normal heart sounds.   Pulmonary:      Effort: Pulmonary effort is normal.      Breath sounds: Normal breath sounds.   Musculoskeletal:         General: Normal range of motion.   Skin:     General: Skin is warm.   Neurological:      Mental Status: He is alert.         Result Review :                Assessment & Plan    Diagnoses and all orders for this visit:    1. Viral upper respiratory tract infection (Primary)  Comments:  steroids, flonase fluids saline nose spray.    Other orders  -     fluticasone (Flonase Allergy Relief) 50 MCG/ACT nasal spray; Administer 2 sprays into the nostril(s) as directed by provider Daily.  Dispense: 18.2 mL; Refill: 3  -     predniSONE (DELTASONE) 10 MG tablet; Take 4 tablets by mouth Daily for 2 days, THEN 3 tablets Daily for 2 days, THEN 2 tablets Daily for 2 days, THEN 1 tablet Daily for 2 days.  Dispense: 20 tablet; Refill: 0      Patient Instructions   Drink fluids   Oral mucinex  is ok no nasal spray except saline and flonase  Continue singulair.   Use saline as prescribed   Nyquil can help you if needed  Delsym might be better option  If fever chill short of air get seen.     Follow Up   Return if symptoms worsen or fail to improve.    Patient was given instructions and counseling regarding his condition or for health maintenance advice. Please see specific information pulled into the AVS if  appropriate.     Cherelle Vila, APRN    10/04/24      Answers submitted by the patient for this visit:  Other (Submitted on 10/3/2024)  Please describe your symptoms.: Upper Respiratory Infection  Have you had these symptoms before?: Yes  How long have you been having these symptoms?: 1-2 weeks  Please list any medications you are currently taking for this condition.: Nyquil  Please describe any probable cause for these symptoms. : Went to [facility]  they said upper Respiratory Infection  Don't Get me anything for it , Said  I was on day 4 I should be over it by day 7  . I m not  Primary Reason for Visit (Submitted on 10/3/2024)  What is the primary reason for your visit?: Problem Not Listed

## 2024-10-04 NOTE — PATIENT INSTRUCTIONS
Drink fluids   Oral mucinex  is ok no nasal spray except saline and flonase  Continue singulair.   Use saline as prescribed   Nyquil can help you if needed  Delsym might be better option  If fever chill short of air get seen.

## 2024-10-18 ENCOUNTER — OFFICE (AMBULATORY)
Age: 69
End: 2024-10-18

## 2024-10-18 ENCOUNTER — ON CAMPUS - OUTPATIENT (AMBULATORY)
Age: 69
End: 2024-10-18

## 2024-10-18 ENCOUNTER — ON CAMPUS - OUTPATIENT (AMBULATORY)
Dept: URBAN - METROPOLITAN AREA HOSPITAL 2 | Facility: HOSPITAL | Age: 69
End: 2024-10-18

## 2024-10-18 ENCOUNTER — OFFICE (AMBULATORY)
Dept: URBAN - METROPOLITAN AREA PATHOLOGY 19 | Facility: PATHOLOGY | Age: 69
End: 2024-10-18
Payer: COMMERCIAL

## 2024-10-18 ENCOUNTER — OFFICE (AMBULATORY)
Dept: URBAN - METROPOLITAN AREA PATHOLOGY 19 | Facility: PATHOLOGY | Age: 69
End: 2024-10-18

## 2024-10-18 VITALS
TEMPERATURE: 97.1 F | DIASTOLIC BLOOD PRESSURE: 63 MMHG | HEART RATE: 70 BPM | HEART RATE: 73 BPM | RESPIRATION RATE: 16 BRPM | DIASTOLIC BLOOD PRESSURE: 68 MMHG | HEART RATE: 70 BPM | DIASTOLIC BLOOD PRESSURE: 63 MMHG | SYSTOLIC BLOOD PRESSURE: 126 MMHG | DIASTOLIC BLOOD PRESSURE: 85 MMHG | RESPIRATION RATE: 18 BRPM | HEART RATE: 75 BPM | HEART RATE: 90 BPM | HEART RATE: 87 BPM | DIASTOLIC BLOOD PRESSURE: 68 MMHG | HEIGHT: 66 IN | DIASTOLIC BLOOD PRESSURE: 100 MMHG | HEART RATE: 70 BPM | SYSTOLIC BLOOD PRESSURE: 136 MMHG | OXYGEN SATURATION: 98 % | OXYGEN SATURATION: 96 % | DIASTOLIC BLOOD PRESSURE: 96 MMHG | HEART RATE: 65 BPM | DIASTOLIC BLOOD PRESSURE: 85 MMHG | SYSTOLIC BLOOD PRESSURE: 154 MMHG | DIASTOLIC BLOOD PRESSURE: 63 MMHG | DIASTOLIC BLOOD PRESSURE: 65 MMHG | SYSTOLIC BLOOD PRESSURE: 136 MMHG | DIASTOLIC BLOOD PRESSURE: 86 MMHG | SYSTOLIC BLOOD PRESSURE: 106 MMHG | HEIGHT: 66 IN | HEART RATE: 75 BPM | HEART RATE: 66 BPM | OXYGEN SATURATION: 95 % | DIASTOLIC BLOOD PRESSURE: 86 MMHG | DIASTOLIC BLOOD PRESSURE: 81 MMHG | HEART RATE: 87 BPM | RESPIRATION RATE: 16 BRPM | HEART RATE: 87 BPM | SYSTOLIC BLOOD PRESSURE: 126 MMHG | HEART RATE: 73 BPM | HEIGHT: 66 IN | OXYGEN SATURATION: 96 % | HEART RATE: 65 BPM | DIASTOLIC BLOOD PRESSURE: 85 MMHG | SYSTOLIC BLOOD PRESSURE: 97 MMHG | HEART RATE: 84 BPM | SYSTOLIC BLOOD PRESSURE: 106 MMHG | HEIGHT: 66 IN | DIASTOLIC BLOOD PRESSURE: 65 MMHG | HEART RATE: 73 BPM | RESPIRATION RATE: 18 BRPM | HEIGHT: 66 IN | HEART RATE: 73 BPM | OXYGEN SATURATION: 98 % | SYSTOLIC BLOOD PRESSURE: 106 MMHG | HEART RATE: 66 BPM | OXYGEN SATURATION: 95 % | HEART RATE: 87 BPM | DIASTOLIC BLOOD PRESSURE: 65 MMHG | RESPIRATION RATE: 16 BRPM | HEART RATE: 65 BPM | SYSTOLIC BLOOD PRESSURE: 97 MMHG | SYSTOLIC BLOOD PRESSURE: 171 MMHG | OXYGEN SATURATION: 95 % | HEART RATE: 66 BPM | RESPIRATION RATE: 16 BRPM | SYSTOLIC BLOOD PRESSURE: 97 MMHG | RESPIRATION RATE: 18 BRPM | OXYGEN SATURATION: 96 % | RESPIRATION RATE: 18 BRPM | TEMPERATURE: 97.1 F | HEART RATE: 75 BPM | SYSTOLIC BLOOD PRESSURE: 154 MMHG | SYSTOLIC BLOOD PRESSURE: 134 MMHG | OXYGEN SATURATION: 95 % | RESPIRATION RATE: 16 BRPM | SYSTOLIC BLOOD PRESSURE: 171 MMHG | SYSTOLIC BLOOD PRESSURE: 126 MMHG | SYSTOLIC BLOOD PRESSURE: 109 MMHG | SYSTOLIC BLOOD PRESSURE: 154 MMHG | DIASTOLIC BLOOD PRESSURE: 81 MMHG | HEART RATE: 66 BPM | OXYGEN SATURATION: 98 % | WEIGHT: 171 LBS | DIASTOLIC BLOOD PRESSURE: 65 MMHG | TEMPERATURE: 97.1 F | HEART RATE: 90 BPM | HEART RATE: 65 BPM | DIASTOLIC BLOOD PRESSURE: 96 MMHG | SYSTOLIC BLOOD PRESSURE: 136 MMHG | SYSTOLIC BLOOD PRESSURE: 106 MMHG | DIASTOLIC BLOOD PRESSURE: 81 MMHG | DIASTOLIC BLOOD PRESSURE: 81 MMHG | HEART RATE: 66 BPM | DIASTOLIC BLOOD PRESSURE: 63 MMHG | HEART RATE: 84 BPM | HEART RATE: 65 BPM | SYSTOLIC BLOOD PRESSURE: 109 MMHG | DIASTOLIC BLOOD PRESSURE: 63 MMHG | DIASTOLIC BLOOD PRESSURE: 68 MMHG | HEART RATE: 73 BPM | OXYGEN SATURATION: 96 % | HEIGHT: 66 IN | OXYGEN SATURATION: 95 % | HEART RATE: 87 BPM | WEIGHT: 171 LBS | OXYGEN SATURATION: 98 % | OXYGEN SATURATION: 98 % | DIASTOLIC BLOOD PRESSURE: 68 MMHG | TEMPERATURE: 97.1 F | SYSTOLIC BLOOD PRESSURE: 126 MMHG | DIASTOLIC BLOOD PRESSURE: 96 MMHG | SYSTOLIC BLOOD PRESSURE: 109 MMHG | SYSTOLIC BLOOD PRESSURE: 106 MMHG | RESPIRATION RATE: 16 BRPM | SYSTOLIC BLOOD PRESSURE: 109 MMHG | HEART RATE: 73 BPM | SYSTOLIC BLOOD PRESSURE: 136 MMHG | OXYGEN SATURATION: 96 % | DIASTOLIC BLOOD PRESSURE: 63 MMHG | RESPIRATION RATE: 16 BRPM | WEIGHT: 171 LBS | DIASTOLIC BLOOD PRESSURE: 96 MMHG | HEART RATE: 70 BPM | HEART RATE: 75 BPM | DIASTOLIC BLOOD PRESSURE: 96 MMHG | HEART RATE: 75 BPM | DIASTOLIC BLOOD PRESSURE: 86 MMHG | SYSTOLIC BLOOD PRESSURE: 126 MMHG | DIASTOLIC BLOOD PRESSURE: 68 MMHG | DIASTOLIC BLOOD PRESSURE: 65 MMHG | TEMPERATURE: 97.1 F | DIASTOLIC BLOOD PRESSURE: 96 MMHG | DIASTOLIC BLOOD PRESSURE: 100 MMHG | DIASTOLIC BLOOD PRESSURE: 86 MMHG | HEART RATE: 87 BPM | SYSTOLIC BLOOD PRESSURE: 171 MMHG | OXYGEN SATURATION: 98 % | HEART RATE: 87 BPM | DIASTOLIC BLOOD PRESSURE: 100 MMHG | SYSTOLIC BLOOD PRESSURE: 154 MMHG | SYSTOLIC BLOOD PRESSURE: 171 MMHG | HEART RATE: 90 BPM | DIASTOLIC BLOOD PRESSURE: 100 MMHG | SYSTOLIC BLOOD PRESSURE: 154 MMHG | OXYGEN SATURATION: 98 % | DIASTOLIC BLOOD PRESSURE: 85 MMHG | WEIGHT: 171 LBS | DIASTOLIC BLOOD PRESSURE: 85 MMHG | HEART RATE: 66 BPM | HEART RATE: 65 BPM | HEART RATE: 84 BPM | SYSTOLIC BLOOD PRESSURE: 136 MMHG | SYSTOLIC BLOOD PRESSURE: 134 MMHG | HEART RATE: 90 BPM | SYSTOLIC BLOOD PRESSURE: 109 MMHG | SYSTOLIC BLOOD PRESSURE: 134 MMHG | OXYGEN SATURATION: 95 % | HEART RATE: 73 BPM | TEMPERATURE: 97.1 F | DIASTOLIC BLOOD PRESSURE: 86 MMHG | SYSTOLIC BLOOD PRESSURE: 134 MMHG | RESPIRATION RATE: 18 BRPM | OXYGEN SATURATION: 96 % | SYSTOLIC BLOOD PRESSURE: 126 MMHG | OXYGEN SATURATION: 96 % | DIASTOLIC BLOOD PRESSURE: 81 MMHG | WEIGHT: 171 LBS | HEIGHT: 66 IN | HEART RATE: 90 BPM | HEART RATE: 84 BPM | SYSTOLIC BLOOD PRESSURE: 97 MMHG | DIASTOLIC BLOOD PRESSURE: 85 MMHG | HEART RATE: 65 BPM | DIASTOLIC BLOOD PRESSURE: 100 MMHG | DIASTOLIC BLOOD PRESSURE: 96 MMHG | HEART RATE: 70 BPM | HEART RATE: 70 BPM | HEART RATE: 90 BPM | SYSTOLIC BLOOD PRESSURE: 136 MMHG | SYSTOLIC BLOOD PRESSURE: 134 MMHG | SYSTOLIC BLOOD PRESSURE: 171 MMHG | WEIGHT: 171 LBS | SYSTOLIC BLOOD PRESSURE: 171 MMHG | HEART RATE: 75 BPM | DIASTOLIC BLOOD PRESSURE: 63 MMHG | TEMPERATURE: 97.1 F | HEART RATE: 84 BPM | WEIGHT: 171 LBS | HEART RATE: 70 BPM | SYSTOLIC BLOOD PRESSURE: 97 MMHG | SYSTOLIC BLOOD PRESSURE: 134 MMHG | HEART RATE: 84 BPM | SYSTOLIC BLOOD PRESSURE: 109 MMHG | SYSTOLIC BLOOD PRESSURE: 171 MMHG | DIASTOLIC BLOOD PRESSURE: 86 MMHG | SYSTOLIC BLOOD PRESSURE: 106 MMHG | HEART RATE: 75 BPM | SYSTOLIC BLOOD PRESSURE: 154 MMHG | DIASTOLIC BLOOD PRESSURE: 65 MMHG | SYSTOLIC BLOOD PRESSURE: 109 MMHG | RESPIRATION RATE: 18 BRPM | DIASTOLIC BLOOD PRESSURE: 81 MMHG | SYSTOLIC BLOOD PRESSURE: 97 MMHG | RESPIRATION RATE: 18 BRPM | DIASTOLIC BLOOD PRESSURE: 100 MMHG | DIASTOLIC BLOOD PRESSURE: 85 MMHG | DIASTOLIC BLOOD PRESSURE: 86 MMHG | DIASTOLIC BLOOD PRESSURE: 65 MMHG | OXYGEN SATURATION: 95 % | SYSTOLIC BLOOD PRESSURE: 154 MMHG | DIASTOLIC BLOOD PRESSURE: 68 MMHG | SYSTOLIC BLOOD PRESSURE: 106 MMHG | SYSTOLIC BLOOD PRESSURE: 134 MMHG | HEART RATE: 90 BPM | SYSTOLIC BLOOD PRESSURE: 126 MMHG | HEART RATE: 66 BPM | DIASTOLIC BLOOD PRESSURE: 81 MMHG | DIASTOLIC BLOOD PRESSURE: 68 MMHG | SYSTOLIC BLOOD PRESSURE: 136 MMHG | DIASTOLIC BLOOD PRESSURE: 100 MMHG | SYSTOLIC BLOOD PRESSURE: 97 MMHG | HEART RATE: 84 BPM

## 2024-10-18 DIAGNOSIS — K57.30 DIVERTICULOSIS OF LARGE INTESTINE WITHOUT PERFORATION OR ABS: ICD-10-CM

## 2024-10-18 DIAGNOSIS — Z09 ENCOUNTER FOR FOLLOW-UP EXAMINATION AFTER COMPLETED TREATMEN: ICD-10-CM

## 2024-10-18 DIAGNOSIS — D12.2 BENIGN NEOPLASM OF ASCENDING COLON: ICD-10-CM

## 2024-10-18 DIAGNOSIS — Z86.0101 PERSONAL HISTORY OF ADENOMATOUS AND SERRATED COLON POLYPS: ICD-10-CM

## 2024-10-18 PROBLEM — K63.5 POLYP OF COLON: Status: ACTIVE | Noted: 2024-10-18

## 2024-10-18 LAB
GI HISTOLOGY: A. ASCENDING COLON: (no result)
GI HISTOLOGY: PDF REPORT: (no result)

## 2024-10-18 PROCEDURE — 45385 COLONOSCOPY W/LESION REMOVAL: CPT | Mod: PT | Performed by: INTERNAL MEDICINE

## 2024-10-18 PROCEDURE — 88305 TISSUE EXAM BY PATHOLOGIST: CPT | Mod: 26 | Performed by: PATHOLOGY

## 2024-10-22 PROBLEM — D12.6 TUBULAR ADENOMA OF COLON: Status: ACTIVE | Noted: 2024-10-22

## 2024-10-28 ENCOUNTER — OFFICE VISIT (OUTPATIENT)
Dept: FAMILY MEDICINE CLINIC | Facility: CLINIC | Age: 69
End: 2024-10-28
Payer: MEDICARE

## 2024-10-28 VITALS
HEART RATE: 85 BPM | HEIGHT: 66 IN | RESPIRATION RATE: 16 BRPM | WEIGHT: 180.4 LBS | BODY MASS INDEX: 28.99 KG/M2 | DIASTOLIC BLOOD PRESSURE: 82 MMHG | SYSTOLIC BLOOD PRESSURE: 130 MMHG | TEMPERATURE: 98.4 F | OXYGEN SATURATION: 92 %

## 2024-10-28 DIAGNOSIS — J40 BRONCHITIS: Primary | ICD-10-CM

## 2024-10-28 PROCEDURE — 99214 OFFICE O/P EST MOD 30 MIN: CPT | Performed by: NURSE PRACTITIONER

## 2024-10-28 PROCEDURE — 3075F SYST BP GE 130 - 139MM HG: CPT | Performed by: NURSE PRACTITIONER

## 2024-10-28 PROCEDURE — 1126F AMNT PAIN NOTED NONE PRSNT: CPT | Performed by: NURSE PRACTITIONER

## 2024-10-28 PROCEDURE — 3079F DIAST BP 80-89 MM HG: CPT | Performed by: NURSE PRACTITIONER

## 2024-10-28 RX ORDER — ALBUTEROL SULFATE 90 UG/1
2 INHALANT RESPIRATORY (INHALATION) EVERY 4 HOURS PRN
Qty: 18 G | Refills: 1 | Status: SHIPPED | OUTPATIENT
Start: 2024-10-28

## 2024-10-28 RX ORDER — AZITHROMYCIN 250 MG/1
TABLET, FILM COATED ORAL
Qty: 6 TABLET | Refills: 0 | Status: SHIPPED | OUTPATIENT
Start: 2024-10-28

## 2024-10-28 RX ORDER — PREDNISONE 10 MG/1
TABLET ORAL
Qty: 20 TABLET | Refills: 0 | Status: SHIPPED | OUTPATIENT
Start: 2024-10-28 | End: 2024-11-05

## 2024-10-28 NOTE — PROGRESS NOTES
Subjective        Abel Tafoya is a 69 y.o. male.     Chief Complaint   Patient presents with    Sinus Problem     Started 5 weeks ago       Sinus Problem      Patient is here for sinus congestion he took some boards off porch that were moldy and he is allergic to mold.  He was seen here 10/4 and prescribed steroids . He got better but this started after he completed steroids.   He is wheezing. No fever, no nausea no vomiting.     The following portions of the patient's history were reviewed and updated as appropriate: allergies, current medications, past family history, past medical history, past social history, past surgical history and problem list.      Current Outpatient Medications:     amLODIPine (NORVASC) 5 MG tablet, Take 1 tablet by mouth Daily., Disp: 90 tablet, Rfl: 1    ascorbic acid (VITAMIN C) 100 MG tablet,  VITAMIN C TABS, Disp: , Rfl:     aspirin 81 MG chewable tablet, Chew 1 tablet Daily., Disp: , Rfl:     atorvastatin (LIPITOR) 80 MG tablet, Take 1 tablet by mouth Daily for 180 days., Disp: 90 tablet, Rfl: 1    Cholecalciferol 1000 units capsule,  VITAMIN D3 CAPS, Disp: , Rfl:     clobetasol (TEMOVATE) 0.05 % ointment, , Disp: , Rfl:     fluticasone (Flonase Allergy Relief) 50 MCG/ACT nasal spray, Administer 2 sprays into the nostril(s) as directed by provider Daily., Disp: 18.2 mL, Rfl: 3    montelukast (SINGULAIR) 10 MG tablet, Take 1 tablet by mouth Every Night., Disp: 90 tablet, Rfl: 1    Multiple Vitamins-Minerals (QC MENS DAILY MULTIVITAMIN) tablet, QC MENS DAILY MULTIVITAMIN TABS, Disp: , Rfl:     mupirocin (BACTROBAN) 2 % ointment, , Disp: , Rfl:     tamsulosin (FLOMAX) 0.4 MG capsule 24 hr capsule, Take 1 capsule by mouth Daily., Disp: 30 capsule, Rfl: 11    albuterol sulfate  (90 Base) MCG/ACT inhaler, Inhale 2 puffs Every 4 (Four) Hours As Needed for Wheezing., Disp: 18 g, Rfl: 1    azithromycin (Zithromax) 250 MG tablet, Take 2 tablets the first day, then 1 tablet daily  "for 4 days., Disp: 6 tablet, Rfl: 0    predniSONE (DELTASONE) 10 MG tablet, Take 4 tablets by mouth Daily for 2 days, THEN 3 tablets Daily for 2 days, THEN 2 tablets Daily for 2 days, THEN 1 tablet Daily for 2 days., Disp: 20 tablet, Rfl: 0    Recent Results (from the past 24 weeks)   Lipid Panel    Collection Time: 07/16/24  8:13 AM    Specimen: Blood   Result Value Ref Range    Total Cholesterol 147 0 - 200 mg/dL    Triglycerides 116 0 - 150 mg/dL    HDL Cholesterol 45 40 - 60 mg/dL    LDL Cholesterol  81 0 - 100 mg/dL    VLDL Cholesterol 21 5 - 40 mg/dL    LDL/HDL Ratio 1.75    Comprehensive Metabolic Panel    Collection Time: 07/16/24  8:13 AM    Specimen: Blood   Result Value Ref Range    Glucose 101 (H) 65 - 99 mg/dL    BUN 25 (H) 8 - 23 mg/dL    Creatinine 1.10 0.76 - 1.27 mg/dL    Sodium 142 136 - 145 mmol/L    Potassium 4.3 3.5 - 5.2 mmol/L    Chloride 105 98 - 107 mmol/L    CO2 26.0 22.0 - 29.0 mmol/L    Calcium 9.4 8.6 - 10.5 mg/dL    Total Protein 7.2 6.0 - 8.5 g/dL    Albumin 4.3 3.5 - 5.2 g/dL    ALT (SGPT) 31 1 - 41 U/L    AST (SGOT) 29 1 - 40 U/L    Alkaline Phosphatase 85 39 - 117 U/L    Total Bilirubin 0.4 0.0 - 1.2 mg/dL    Globulin 2.9 gm/dL    A/G Ratio 1.5 g/dL    BUN/Creatinine Ratio 22.7 7.0 - 25.0    Anion Gap 11.0 5.0 - 15.0 mmol/L    eGFR 74.0 >60.0 mL/min/1.73   Hemoglobin A1c    Collection Time: 07/16/24  8:13 AM    Specimen: Blood   Result Value Ref Range    Hemoglobin A1C 5.80 (H) 4.80 - 5.60 %   Vitamin D,25-Hydroxy    Collection Time: 07/16/24  8:13 AM    Specimen: Blood   Result Value Ref Range    25 Hydroxy, Vitamin D 40.4 30.0 - 100.0 ng/ml         Review of Systems    Objective     /82 (BP Location: Left arm, Patient Position: Sitting, Cuff Size: Adult)   Pulse 85   Temp 98.4 °F (36.9 °C) (Oral)   Resp 16   Ht 167.6 cm (65.98\")   Wt 81.8 kg (180 lb 6.4 oz)   SpO2 92%   BMI 29.13 kg/m²     Physical Exam  Vitals and nursing note reviewed.   Constitutional:       " Appearance: Normal appearance.   HENT:      Head: Normocephalic.      Right Ear: Tympanic membrane is erythematous.      Mouth/Throat:      Mouth: Mucous membranes are moist.   Eyes:      Conjunctiva/sclera: Conjunctivae normal.      Pupils: Pupils are equal, round, and reactive to light.   Cardiovascular:      Rate and Rhythm: Normal rate and regular rhythm.      Pulses: Normal pulses.      Heart sounds: Normal heart sounds.   Pulmonary:      Breath sounds: Wheezing present. No rales.   Abdominal:      General: Bowel sounds are normal.      Palpations: Abdomen is soft.   Skin:     General: Skin is warm.   Neurological:      General: No focal deficit present.      Mental Status: He is alert and oriented to person, place, and time.   Psychiatric:         Mood and Affect: Mood normal.         Result Review :                Assessment & Plan    Diagnoses and all orders for this visit:    1. Bronchitis (Primary)  Comments:  inhaler zithromax and steroids.    Other orders  -     predniSONE (DELTASONE) 10 MG tablet; Take 4 tablets by mouth Daily for 2 days, THEN 3 tablets Daily for 2 days, THEN 2 tablets Daily for 2 days, THEN 1 tablet Daily for 2 days.  Dispense: 20 tablet; Refill: 0  -     azithromycin (Zithromax) 250 MG tablet; Take 2 tablets the first day, then 1 tablet daily for 4 days.  Dispense: 6 tablet; Refill: 0  -     albuterol sulfate  (90 Base) MCG/ACT inhaler; Inhale 2 puffs Every 4 (Four) Hours As Needed for Wheezing.  Dispense: 18 g; Refill: 1      Patient Instructions   Use inhaler   Take steroids in am with food  Take the zithromax.  If you worsen get seen.   If short of air go to the ED for further evaluiton.     Follow Up   Return if symptoms worsen or fail to improve.    Patient was given instructions and counseling regarding his condition or for health maintenance advice. Please see specific information pulled into the AVS if appropriate.     Cherelle Vila, APRN    10/28/24

## 2024-10-28 NOTE — PATIENT INSTRUCTIONS
Use inhaler   Take steroids in am with food  Take the zithromax.  If you worsen get seen.   If short of air go to the ED for further evaluiton.

## 2024-11-11 ENCOUNTER — OFFICE VISIT (OUTPATIENT)
Dept: FAMILY MEDICINE CLINIC | Facility: CLINIC | Age: 69
End: 2024-11-11
Payer: MEDICARE

## 2024-11-11 ENCOUNTER — LAB (OUTPATIENT)
Dept: LAB | Facility: HOSPITAL | Age: 69
End: 2024-11-11
Payer: MEDICARE

## 2024-11-11 ENCOUNTER — HOSPITAL ENCOUNTER (OUTPATIENT)
Dept: GENERAL RADIOLOGY | Facility: HOSPITAL | Age: 69
Discharge: HOME | End: 2024-11-11
Payer: MEDICARE

## 2024-11-11 VITALS
TEMPERATURE: 97.9 F | SYSTOLIC BLOOD PRESSURE: 123 MMHG | HEIGHT: 66 IN | DIASTOLIC BLOOD PRESSURE: 80 MMHG | WEIGHT: 181.6 LBS | BODY MASS INDEX: 29.18 KG/M2 | OXYGEN SATURATION: 96 % | HEART RATE: 79 BPM | RESPIRATION RATE: 16 BRPM

## 2024-11-11 DIAGNOSIS — J06.9 VIRAL UPPER RESPIRATORY TRACT INFECTION: ICD-10-CM

## 2024-11-11 DIAGNOSIS — I10 ESSENTIAL HYPERTENSION: Chronic | ICD-10-CM

## 2024-11-11 DIAGNOSIS — J06.9 VIRAL UPPER RESPIRATORY TRACT INFECTION: Primary | ICD-10-CM

## 2024-11-11 DIAGNOSIS — J01.81 OTHER ACUTE RECURRENT SINUSITIS: ICD-10-CM

## 2024-11-11 LAB
ANION GAP SERPL CALCULATED.3IONS-SCNC: 7.9 MMOL/L (ref 5–15)
BASOPHILS # BLD AUTO: 0.06 10*3/MM3 (ref 0–0.2)
BASOPHILS NFR BLD AUTO: 0.5 % (ref 0–1.5)
BUN SERPL-MCNC: 20 MG/DL (ref 8–23)
BUN/CREAT SERPL: 18.3 (ref 7–25)
CALCIUM SPEC-SCNC: 9.1 MG/DL (ref 8.6–10.5)
CHLORIDE SERPL-SCNC: 105 MMOL/L (ref 98–107)
CO2 SERPL-SCNC: 27.1 MMOL/L (ref 22–29)
CREAT SERPL-MCNC: 1.09 MG/DL (ref 0.76–1.27)
DEPRECATED RDW RBC AUTO: 51.4 FL (ref 37–54)
EGFRCR SERPLBLD CKD-EPI 2021: 74.4 ML/MIN/1.73
EOSINOPHIL # BLD AUTO: 0.16 10*3/MM3 (ref 0–0.4)
EOSINOPHIL NFR BLD AUTO: 1.2 % (ref 0.3–6.2)
ERYTHROCYTE [DISTWIDTH] IN BLOOD BY AUTOMATED COUNT: 14.2 % (ref 12.3–15.4)
GLUCOSE SERPL-MCNC: 80 MG/DL (ref 65–99)
HCT VFR BLD AUTO: 44.5 % (ref 37.5–51)
HGB BLD-MCNC: 14.4 G/DL (ref 13–17.7)
IMM GRANULOCYTES # BLD AUTO: 0.07 10*3/MM3 (ref 0–0.05)
IMM GRANULOCYTES NFR BLD AUTO: 0.5 % (ref 0–0.5)
LYMPHOCYTES # BLD AUTO: 2.49 10*3/MM3 (ref 0.7–3.1)
LYMPHOCYTES NFR BLD AUTO: 18.9 % (ref 19.6–45.3)
MCH RBC QN AUTO: 31.4 PG (ref 26.6–33)
MCHC RBC AUTO-ENTMCNC: 32.4 G/DL (ref 31.5–35.7)
MCV RBC AUTO: 96.9 FL (ref 79–97)
MONOCYTES # BLD AUTO: 1.06 10*3/MM3 (ref 0.1–0.9)
MONOCYTES NFR BLD AUTO: 8.1 % (ref 5–12)
NEUTROPHILS NFR BLD AUTO: 70.8 % (ref 42.7–76)
NEUTROPHILS NFR BLD AUTO: 9.31 10*3/MM3 (ref 1.7–7)
NRBC BLD AUTO-RTO: 0 /100 WBC (ref 0–0.2)
PLATELET # BLD AUTO: 319 10*3/MM3 (ref 140–450)
PMV BLD AUTO: 9.7 FL (ref 6–12)
POTASSIUM SERPL-SCNC: 5.1 MMOL/L (ref 3.5–5.2)
RBC # BLD AUTO: 4.59 10*6/MM3 (ref 4.14–5.8)
SODIUM SERPL-SCNC: 140 MMOL/L (ref 136–145)
WBC NRBC COR # BLD AUTO: 13.15 10*3/MM3 (ref 3.4–10.8)

## 2024-11-11 PROCEDURE — 1126F AMNT PAIN NOTED NONE PRSNT: CPT | Performed by: NURSE PRACTITIONER

## 2024-11-11 PROCEDURE — 3079F DIAST BP 80-89 MM HG: CPT | Performed by: NURSE PRACTITIONER

## 2024-11-11 PROCEDURE — 85025 COMPLETE CBC W/AUTO DIFF WBC: CPT

## 2024-11-11 PROCEDURE — 80048 BASIC METABOLIC PNL TOTAL CA: CPT

## 2024-11-11 PROCEDURE — 36415 COLL VENOUS BLD VENIPUNCTURE: CPT

## 2024-11-11 PROCEDURE — 3074F SYST BP LT 130 MM HG: CPT | Performed by: NURSE PRACTITIONER

## 2024-11-11 PROCEDURE — 71046 X-RAY EXAM CHEST 2 VIEWS: CPT

## 2024-11-11 PROCEDURE — 99213 OFFICE O/P EST LOW 20 MIN: CPT | Performed by: NURSE PRACTITIONER

## 2024-11-11 RX ORDER — POLYETHYLENE GLYCOL 3350 17 G/17G
POWDER, FOR SOLUTION ORAL
COMMUNITY
Start: 2024-10-04

## 2024-11-11 RX ORDER — AZELASTINE 1 MG/ML
2 SPRAY, METERED NASAL 2 TIMES DAILY
Qty: 30 ML | Refills: 1 | Status: SHIPPED | OUTPATIENT
Start: 2024-11-11

## 2024-11-11 RX ORDER — SORBITOL SOLUTION 70 %
SOLUTION, ORAL MISCELLANEOUS
COMMUNITY
Start: 2024-10-10

## 2024-11-11 RX ORDER — PREDNISONE 10 MG/1
TABLET ORAL
Qty: 20 TABLET | Refills: 0 | Status: SHIPPED | OUTPATIENT
Start: 2024-11-11 | End: 2024-11-19

## 2024-11-11 NOTE — PROGRESS NOTES
Subjective        Abel Tafoya is a 69 y.o. male.     Chief Complaint   Patient presents with    Nasal Congestion     Still not feeling better.   Felt better for a week and a half, then came back.       History of Present Illness  Still sinus congestion no fever. Seen 10/28/2024 took zpac and steroids got better.   Sunday started again.   Can taste and smell food.   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  Taking dayquil for symptoms.   He has inhaler just keeps blowing nose .   He is not using his over the counter nasal spray. He is using flonase.       The following portions of the patient's history were reviewed and updated as appropriate: allergies, current medications, past family history, past medical history, past social history, past surgical history and problem list.      Current Outpatient Medications:     albuterol sulfate  (90 Base) MCG/ACT inhaler, Inhale 2 puffs Every 4 (Four) Hours As Needed for Wheezing., Disp: 18 g, Rfl: 1    amLODIPine (NORVASC) 5 MG tablet, Take 1 tablet by mouth Daily., Disp: 90 tablet, Rfl: 1    ascorbic acid (VITAMIN C) 100 MG tablet,  VITAMIN C TABS, Disp: , Rfl:     aspirin 81 MG chewable tablet, Chew 1 tablet Daily., Disp: , Rfl:     atorvastatin (LIPITOR) 80 MG tablet, Take 1 tablet by mouth Daily for 180 days., Disp: 90 tablet, Rfl: 1    Cholecalciferol 1000 units capsule,  VITAMIN D3 CAPS, Disp: , Rfl:     clobetasol (TEMOVATE) 0.05 % ointment, , Disp: , Rfl:     fluticasone (Flonase Allergy Relief) 50 MCG/ACT nasal spray, Administer 2 sprays into the nostril(s) as directed by provider Daily., Disp: 18.2 mL, Rfl: 3    montelukast (SINGULAIR) 10 MG tablet, Take 1 tablet by mouth Every Night., Disp: 90 tablet, Rfl: 1    Multiple Vitamins-Minerals (QC MENS DAILY MULTIVITAMIN) tablet, QC MENS DAILY MULTIVITAMIN TABS, Disp: , Rfl:     mupirocin (BACTROBAN) 2 % ointment, , Disp: , Rfl:     polyethylene glycol (MIRALAX) 17 GM/SCOOP powder, TAKE AS DIRECTED  PER INSTRUCTIONS FOR BOWEL PREP, Disp: , Rfl:     sorbitol 70 % solution solution, TAKE 100 ML BY MOUTH AS DIRECTED FOR 1 DAY, Disp: , Rfl:     tamsulosin (FLOMAX) 0.4 MG capsule 24 hr capsule, Take 1 capsule by mouth Daily., Disp: 30 capsule, Rfl: 11    azelastine (ASTELIN) 0.1 % nasal spray, Administer 2 sprays into the nostril(s) as directed by provider 2 (Two) Times a Day. Use in each nostril as directed, Disp: 30 mL, Rfl: 1    predniSONE (DELTASONE) 10 MG tablet, Take 4 tablets by mouth Daily for 2 days, THEN 3 tablets Daily for 2 days, THEN 2 tablets Daily for 2 days, THEN 1 tablet Daily for 2 days., Disp: 20 tablet, Rfl: 0    Recent Results (from the past 24 weeks)   Lipid Panel    Collection Time: 07/16/24  8:13 AM    Specimen: Blood   Result Value Ref Range    Total Cholesterol 147 0 - 200 mg/dL    Triglycerides 116 0 - 150 mg/dL    HDL Cholesterol 45 40 - 60 mg/dL    LDL Cholesterol  81 0 - 100 mg/dL    VLDL Cholesterol 21 5 - 40 mg/dL    LDL/HDL Ratio 1.75    Comprehensive Metabolic Panel    Collection Time: 07/16/24  8:13 AM    Specimen: Blood   Result Value Ref Range    Glucose 101 (H) 65 - 99 mg/dL    BUN 25 (H) 8 - 23 mg/dL    Creatinine 1.10 0.76 - 1.27 mg/dL    Sodium 142 136 - 145 mmol/L    Potassium 4.3 3.5 - 5.2 mmol/L    Chloride 105 98 - 107 mmol/L    CO2 26.0 22.0 - 29.0 mmol/L    Calcium 9.4 8.6 - 10.5 mg/dL    Total Protein 7.2 6.0 - 8.5 g/dL    Albumin 4.3 3.5 - 5.2 g/dL    ALT (SGPT) 31 1 - 41 U/L    AST (SGOT) 29 1 - 40 U/L    Alkaline Phosphatase 85 39 - 117 U/L    Total Bilirubin 0.4 0.0 - 1.2 mg/dL    Globulin 2.9 gm/dL    A/G Ratio 1.5 g/dL    BUN/Creatinine Ratio 22.7 7.0 - 25.0    Anion Gap 11.0 5.0 - 15.0 mmol/L    eGFR 74.0 >60.0 mL/min/1.73   Hemoglobin A1c    Collection Time: 07/16/24  8:13 AM    Specimen: Blood   Result Value Ref Range    Hemoglobin A1C 5.80 (H) 4.80 - 5.60 %   Vitamin D,25-Hydroxy    Collection Time: 07/16/24  8:13 AM    Specimen: Blood   Result Value Ref  "Range    25 Hydroxy, Vitamin D 40.4 30.0 - 100.0 ng/ml         Review of Systems    Objective     /80 (BP Location: Left arm, Patient Position: Sitting, Cuff Size: Large Adult)   Pulse 79   Temp 97.9 °F (36.6 °C) (Oral)   Resp 16   Ht 167.6 cm (66\")   Wt 82.4 kg (181 lb 9.6 oz)   SpO2 96%   BMI 29.31 kg/m²     Physical Exam  Vitals and nursing note reviewed.   Constitutional:       Appearance: Normal appearance.   HENT:      Head: Normocephalic.      Right Ear: Tympanic membrane normal.      Left Ear: Tympanic membrane normal.      Nose: Congestion present.      Mouth/Throat:      Mouth: Mucous membranes are moist.   Eyes:      Pupils: Pupils are equal, round, and reactive to light.   Cardiovascular:      Rate and Rhythm: Normal rate and regular rhythm.      Pulses: Normal pulses.      Heart sounds: Normal heart sounds.   Pulmonary:      Effort: Pulmonary effort is normal.      Breath sounds: Normal breath sounds.   Abdominal:      Palpations: Abdomen is soft.   Skin:     General: Skin is warm.   Neurological:      General: No focal deficit present.      Mental Status: He is alert.   Psychiatric:         Mood and Affect: Mood normal.         Thought Content: Thought content normal.         Result Review :                Assessment & Plan    Diagnoses and all orders for this visit:    1. Viral upper respiratory tract infection (Primary)  Comments:  steroids and testing with chest xray  Orders:  -     XR Chest PA & Lateral; Future  -     CBC & Differential; Future  -     Cancel: Respiratory Panel PCR w/COVID-19(SARS-CoV-2) BRENNAN/RA/DONTRELL/PAD/[facility]AT[facility] In-House, NP Swab in UTM/VTM, 2 HR TAT - Swab, Nasopharynx; Future  -     Basic Metabolic Panel; Future    2. Essential hypertension  Comments:  stable    3. Other acute recurrent sinusitis  Comments:  labs and chest xray ordered.    Other orders  -     azelastine (ASTELIN) 0.1 % nasal spray; Administer 2 sprays into the nostril(s) as directed by provider 2 (Two) " Times a Day. Use in each nostril as directed  Dispense: 30 mL; Refill: 1  -     predniSONE (DELTASONE) 10 MG tablet; Take 4 tablets by mouth Daily for 2 days, THEN 3 tablets Daily for 2 days, THEN 2 tablets Daily for 2 days, THEN 1 tablet Daily for 2 days.  Dispense: 20 tablet; Refill: 0      Patient Instructions   Follow up on testing  Take steroids in am with food  Use the azelesatine spray 2 times a day and flonsase once day.  Drink water.   If short of air get seen.     Follow Up   Return if symptoms worsen or fail to improve, for Next scheduled follow up.    Patient was given instructions and counseling regarding his condition or for health maintenance advice. Please see specific information pulled into the AVS if appropriate.     Cherelle Vila, SHABBIR    11/11/24      Answers submitted by the patient for this visit:  Other (Submitted on 11/11/2024)  Please describe your symptoms.: Yellow  snot  Have you had these symptoms before?: Yes  How long have you been having these symptoms?: 1-4 days  Please list any medications you are currently taking for this condition.: none  Primary Reason for Visit (Submitted on 11/11/2024)  What is the primary reason for your visit?: Problem Not Listed

## 2024-11-11 NOTE — PATIENT INSTRUCTIONS
Follow up on testing  Take steroids in am with food  Use the azelesatine spray 2 times a day and flonsase once day.  Drink water.   If short of air get seen.

## 2024-11-21 RX ORDER — MONTELUKAST SODIUM 10 MG/1
10 TABLET ORAL NIGHTLY
Qty: 90 TABLET | Refills: 1 | Status: SHIPPED | OUTPATIENT
Start: 2024-11-21

## 2024-11-29 RX ORDER — AMLODIPINE BESYLATE 5 MG/1
5 TABLET ORAL DAILY
Qty: 90 TABLET | Refills: 1 | Status: SHIPPED | OUTPATIENT
Start: 2024-11-29

## 2024-12-26 RX ORDER — ATORVASTATIN CALCIUM 80 MG/1
80 TABLET, FILM COATED ORAL DAILY
Qty: 90 TABLET | Refills: 1 | Status: SHIPPED | OUTPATIENT
Start: 2024-12-26 | End: 2025-06-24

## 2024-12-26 RX ORDER — ASPIRIN 81 MG/1
81 TABLET, CHEWABLE ORAL DAILY
Qty: 90 TABLET | Refills: 1 | Status: SHIPPED | OUTPATIENT
Start: 2024-12-26

## 2024-12-26 NOTE — TELEPHONE ENCOUNTER
Caller: [email or web address]    Relationship: Emergency Contact    Best call back number: [phone]     Requested Prescriptions:   Requested Prescriptions     Pending Prescriptions Disp Refills    atorvastatin (LIPITOR) 80 MG tablet 90 tablet 1     Sig: Take 1 tablet by mouth Daily for 180 days.    aspirin 81 MG chewable tablet       Sig: Chew 1 tablet Daily.        Pharmacy where request should be sent: [id number] [place] - [address] SEC OF [facility] [address] [facility] [phone] [facility] [phone]      Last office visit with prescribing clinician: 11/11/2024   Last telemedicine visit with prescribing clinician: Visit date not found   Next office visit with prescribing clinician: 1/10/2025     Additional details provided by patient: PATIENTS WIFE STATES THAT THE PATIENT IS OUT OF THESE MEDICATIONS    Does the patient have less than a 3 day supply:  [x] Yes  [] No    Alvaro Tomas Rep   12/26/24 12:14 EST

## 2025-01-16 ENCOUNTER — OFFICE VISIT (OUTPATIENT)
Dept: FAMILY MEDICINE CLINIC | Facility: CLINIC | Age: 70
End: 2025-01-16
Payer: MEDICARE

## 2025-01-16 VITALS
RESPIRATION RATE: 18 BRPM | HEART RATE: 98 BPM | TEMPERATURE: 98.2 F | DIASTOLIC BLOOD PRESSURE: 84 MMHG | HEIGHT: 66 IN | SYSTOLIC BLOOD PRESSURE: 130 MMHG | BODY MASS INDEX: 29.7 KG/M2 | OXYGEN SATURATION: 96 % | WEIGHT: 184.8 LBS

## 2025-01-16 DIAGNOSIS — I65.21 ICAO (INTERNAL CAROTID ARTERY OCCLUSION), RIGHT: Chronic | ICD-10-CM

## 2025-01-16 DIAGNOSIS — E78.2 MIXED HYPERLIPIDEMIA: Chronic | ICD-10-CM

## 2025-01-16 DIAGNOSIS — I10 ESSENTIAL HYPERTENSION: Chronic | ICD-10-CM

## 2025-01-16 DIAGNOSIS — I63.9 RIGHT SIDED CEREBRAL HEMISPHERE CEREBROVASCULAR ACCIDENT (CVA): Chronic | ICD-10-CM

## 2025-01-16 DIAGNOSIS — E55.9 VITAMIN D DEFICIENCY: Chronic | ICD-10-CM

## 2025-01-16 PROCEDURE — 3075F SYST BP GE 130 - 139MM HG: CPT | Performed by: NURSE PRACTITIONER

## 2025-01-16 PROCEDURE — 99213 OFFICE O/P EST LOW 20 MIN: CPT | Performed by: NURSE PRACTITIONER

## 2025-01-16 PROCEDURE — G2211 COMPLEX E/M VISIT ADD ON: HCPCS | Performed by: NURSE PRACTITIONER

## 2025-01-16 PROCEDURE — 3079F DIAST BP 80-89 MM HG: CPT | Performed by: NURSE PRACTITIONER

## 2025-01-16 PROCEDURE — 1159F MED LIST DOCD IN RCRD: CPT | Performed by: NURSE PRACTITIONER

## 2025-01-16 PROCEDURE — 1160F RVW MEDS BY RX/DR IN RCRD: CPT | Performed by: NURSE PRACTITIONER

## 2025-01-16 PROCEDURE — 1126F AMNT PAIN NOTED NONE PRSNT: CPT | Performed by: NURSE PRACTITIONER

## 2025-01-16 NOTE — PATIENT INSTRUCTIONS
Eat healthy  Exercise daily  Avoid all concentrated sweets  Labs next visitl   Monitor blood sugars

## 2025-01-16 NOTE — PROGRESS NOTES
Subjective        Abel Tafoya is a 69 y.o. male.     Chief Complaint   Patient presents with    Hyperlipidemia     6 month fl/u       Hyperlipidemia      Patient is here for management of his chronic medical problems: hypertension, BPH, hyperlipemia, ICAO, vit d def, CVA,     Hypertension: amlodipine 5  mg daily he has had CVA in past . Has hyperlipidemia.     Hyperlipidemia taking atorvastatin 80 mg daily .     Constipation doing well with mirlax as reported.     History of CVA taking aspirin 81 mg daily notes reviewed 9/28/2024 stent placement Right internal carotid artery. He will have repeat testing upcoming as reported.   Dr Flannery.      BPH stable on flomax 0.4 mg daily.     Allergies flonase daily, astelin daily. Singulair daily.     Vit d def taking daily .       The following portions of the patient's history were reviewed and updated as appropriate: allergies, current medications, past family history, past medical history, past social history, past surgical history and problem list.      Current Outpatient Medications:     albuterol sulfate  (90 Base) MCG/ACT inhaler, Inhale 2 puffs Every 4 (Four) Hours As Needed for Wheezing., Disp: 18 g, Rfl: 1    amLODIPine (NORVASC) 5 MG tablet, TAKE 1 TABLET BY MOUTH DAILY, Disp: 90 tablet, Rfl: 1    ascorbic acid (VITAMIN C) 100 MG tablet, SM VITAMIN C TABS, Disp: , Rfl:     aspirin 81 MG chewable tablet, Chew 1 tablet Daily., Disp: 90 tablet, Rfl: 1    atorvastatin (LIPITOR) 80 MG tablet, Take 1 tablet by mouth Daily for 180 days., Disp: 90 tablet, Rfl: 1    azelastine (ASTELIN) 0.1 % nasal spray, Administer 2 sprays into the nostril(s) as directed by provider 2 (Two) Times a Day. Use in each nostril as directed, Disp: 30 mL, Rfl: 1    Cholecalciferol 1000 units capsule, SM VITAMIN D3 CAPS, Disp: , Rfl:     clobetasol (TEMOVATE) 0.05 % ointment, , Disp: , Rfl:     fluticasone (Flonase Allergy Relief) 50 MCG/ACT nasal spray, Administer 2 sprays into the  nostril(s) as directed by provider Daily., Disp: 18.2 mL, Rfl: 3    montelukast (SINGULAIR) 10 MG tablet, TAKE 1 TABLET BY MOUTH EVERY NIGHT, Disp: 90 tablet, Rfl: 1    Multiple Vitamins-Minerals (QC MENS DAILY MULTIVITAMIN) tablet, QC MENS DAILY MULTIVITAMIN TABS, Disp: , Rfl:     polyethylene glycol (MIRALAX) 17 GM/SCOOP powder, TAKE AS DIRECTED PER INSTRUCTIONS FOR BOWEL PREP, Disp: , Rfl:     tamsulosin (FLOMAX) 0.4 MG capsule 24 hr capsule, Take 1 capsule by mouth Daily., Disp: 30 capsule, Rfl: 11    Recent Results (from the past 24 weeks)   Basic Metabolic Panel    Collection Time: 11/11/24 11:16 AM    Specimen: Blood   Result Value Ref Range    Glucose 80 65 - 99 mg/dL    BUN 20 8 - 23 mg/dL    Creatinine 1.09 0.76 - 1.27 mg/dL    Sodium 140 136 - 145 mmol/L    Potassium 5.1 3.5 - 5.2 mmol/L    Chloride 105 98 - 107 mmol/L    CO2 27.1 22.0 - 29.0 mmol/L    Calcium 9.1 8.6 - 10.5 mg/dL    BUN/Creatinine Ratio 18.3 7.0 - 25.0    Anion Gap 7.9 5.0 - 15.0 mmol/L    eGFR 74.4 >60.0 mL/min/1.73   CBC Auto Differential    Collection Time: 11/11/24 11:16 AM    Specimen: Blood   Result Value Ref Range    WBC 13.15 (H) 3.40 - 10.80 10*3/mm3    RBC 4.59 4.14 - 5.80 10*6/mm3    Hemoglobin 14.4 13.0 - 17.7 g/dL    Hematocrit 44.5 37.5 - 51.0 %    MCV 96.9 79.0 - 97.0 fL    MCH 31.4 26.6 - 33.0 pg    MCHC 32.4 31.5 - 35.7 g/dL    RDW 14.2 12.3 - 15.4 %    RDW-SD 51.4 37.0 - 54.0 fl    MPV 9.7 6.0 - 12.0 fL    Platelets 319 140 - 450 10*3/mm3    Neutrophil % 70.8 42.7 - 76.0 %    Lymphocyte % 18.9 (L) 19.6 - 45.3 %    Monocyte % 8.1 5.0 - 12.0 %    Eosinophil % 1.2 0.3 - 6.2 %    Basophil % 0.5 0.0 - 1.5 %    Immature Grans % 0.5 0.0 - 0.5 %    Neutrophils, Absolute 9.31 (H) 1.70 - 7.00 10*3/mm3    Lymphocytes, Absolute 2.49 0.70 - 3.10 10*3/mm3    Monocytes, Absolute 1.06 (H) 0.10 - 0.90 10*3/mm3    Eosinophils, Absolute 0.16 0.00 - 0.40 10*3/mm3    Basophils, Absolute 0.06 0.00 - 0.20 10*3/mm3    Immature Grans, Absolute  "0.07 (H) 0.00 - 0.05 10*3/mm3    nRBC 0.0 0.0 - 0.2 /100 WBC         Review of Systems    Objective     /84 (BP Location: Left arm, Patient Position: Sitting, Cuff Size: Adult)   Pulse 98   Temp 98.2 °F (36.8 °C) (Oral)   Resp 18   Ht 167.6 cm (65.98\")   Wt 83.8 kg (184 lb 12.8 oz)   SpO2 96%   BMI 29.84 kg/m²     Physical Exam  Vitals and nursing note reviewed.   HENT:      Head: Normocephalic.      Right Ear: Tympanic membrane normal.      Left Ear: Tympanic membrane normal.      Nose: Nose normal.      Mouth/Throat:      Mouth: Mucous membranes are moist.   Eyes:      Pupils: Pupils are equal, round, and reactive to light.   Cardiovascular:      Rate and Rhythm: Normal rate and regular rhythm.      Pulses: Normal pulses.      Heart sounds: Normal heart sounds.   Pulmonary:      Effort: Pulmonary effort is normal.      Breath sounds: Normal breath sounds.   Abdominal:      General: Bowel sounds are normal.      Palpations: Abdomen is soft.   Skin:     General: Skin is warm.      Capillary Refill: Capillary refill takes less than 2 seconds.   Neurological:      General: No focal deficit present.      Mental Status: He is alert and oriented to person, place, and time.   Psychiatric:         Mood and Affect: Mood normal.         Behavior: Behavior normal.         Thought Content: Thought content normal.         Result Review :                Assessment & Plan    Diagnoses and all orders for this visit:    1. Vitamin D deficiency  Comments:  stable    2. Essential hypertension  Comments:  stable    3. Mixed hyperlipidemia  Comments:  stable    4. ICAO (internal carotid artery occlusion), right  Comments:  stable followed by neurosurgery    5. Right sided cerebral hemisphere cerebrovascular accident (CVA)  Comments:  stable will have upcoming testing .      Patient Instructions   Eat healthy  Exercise daily  Avoid all concentrated sweets  Labs next visitl   Monitor blood sugars    Follow Up   Return in " about 6 months (around 7/16/2025).    Patient was given instructions and counseling regarding his condition or for health maintenance advice. Please see specific information pulled into the AVS if appropriate.     Cherelle Vila, APRN    01/16/25

## 2025-01-17 RX ORDER — FLUTICASONE PROPIONATE 50 MCG
SPRAY, SUSPENSION (ML) NASAL
Qty: 16 G | Refills: 4 | Status: SHIPPED | OUTPATIENT
Start: 2025-01-17

## 2025-03-21 RX ORDER — MONTELUKAST SODIUM 10 MG/1
10 TABLET ORAL NIGHTLY
Qty: 90 TABLET | Refills: 1 | Status: SHIPPED | OUTPATIENT
Start: 2025-03-21

## 2025-03-22 ENCOUNTER — HOSPITAL ENCOUNTER (EMERGENCY)
Facility: HOSPITAL | Age: 70
Discharge: HOME | End: 2025-03-22
Payer: MEDICARE

## 2025-03-22 VITALS
OXYGEN SATURATION: 93 % | HEIGHT: 66 IN | BODY MASS INDEX: 29.76 KG/M2 | TEMPERATURE: 97.1 F | RESPIRATION RATE: 15 BRPM | WEIGHT: 185.19 LBS | HEART RATE: 86 BPM | DIASTOLIC BLOOD PRESSURE: 84 MMHG | SYSTOLIC BLOOD PRESSURE: 153 MMHG

## 2025-03-22 DIAGNOSIS — S01.81XA LACERATION OF EYEBROW AND FOREHEAD, LEFT, INITIAL ENCOUNTER: Primary | ICD-10-CM

## 2025-03-22 DIAGNOSIS — S01.112A LACERATION OF EYEBROW AND FOREHEAD, LEFT, INITIAL ENCOUNTER: Primary | ICD-10-CM

## 2025-03-22 PROCEDURE — 25010000002 LIDOCAINE 1% - EPINEPHRINE 1:100000 1 %-1:100000 SOLUTION

## 2025-03-22 PROCEDURE — 99282 EMERGENCY DEPT VISIT SF MDM: CPT

## 2025-03-22 RX ORDER — CEPHALEXIN 500 MG/1
500 CAPSULE ORAL 4 TIMES DAILY
Qty: 28 CAPSULE | Refills: 0 | Status: SHIPPED | OUTPATIENT
Start: 2025-03-22 | End: 2025-03-29

## 2025-03-22 RX ORDER — DIAPER,BRIEF,INFANT-TODD,DISP
1 EACH MISCELLANEOUS ONCE
Status: COMPLETED | OUTPATIENT
Start: 2025-03-22 | End: 2025-03-22

## 2025-03-22 RX ORDER — LIDOCAINE HYDROCHLORIDE AND EPINEPHRINE 10; 10 MG/ML; UG/ML
20 INJECTION, SOLUTION INFILTRATION; PERINEURAL ONCE
Status: COMPLETED | OUTPATIENT
Start: 2025-03-22 | End: 2025-03-22

## 2025-03-22 RX ORDER — CEPHALEXIN 500 MG/1
500 CAPSULE ORAL ONCE
Status: DISCONTINUED | OUTPATIENT
Start: 2025-03-22 | End: 2025-03-22 | Stop reason: HOSPADM

## 2025-03-22 RX ADMIN — BACITRACIN 0.9 G: 500 OINTMENT TOPICAL at 19:49

## 2025-03-22 RX ADMIN — LIDOCAINE HYDROCHLORIDE,EPINEPHRINE BITARTRATE 20 ML: 10; .01 INJECTION, SOLUTION INFILTRATION; PERINEURAL at 19:49

## 2025-03-22 NOTE — Clinical Note
[facility] EMERGENCY DEPARTMENT  [address] [place] [address]  Phone: [phone]    Abel Tafoya was seen and treated in our emergency department on 3/22/2025.  He may return to work on 03/24/2025.         Thank you for choosing [facility].    Natasha Morocho, APRN

## 2025-03-22 NOTE — ED PROVIDER NOTES
Subjective   History of Present Illness  Patient is a 69-year-old gentleman who was cutting wood with a miter saw and then later saw cut his face after it kicked back.  Reports up-to-date on tetanus.  Laceration noted to forehead between the eyes and across the left eyebrow.  Denies any loss of consciousness  Review of Systems   Skin:  Positive for wound.   All other systems reviewed and are negative.      Past Medical History:   Diagnosis Date    Allergic     Broken neck     Colon tumor     Deep vein thrombosis     HL (hearing loss)     Hyperlipidemia     Hypertension     Stroke        No Known Allergies    Past Surgical History:   Procedure Laterality Date    APPENDECTOMY      CAROTID STENT Right     COLON BIOPSY      NECK SURGERY      broken neck    SKIN GRAFT      TUMOR EXCISION      colon       Family History   Problem Relation Age of Onset    Diabetes Mother     Thyroid disease Mother     Hypertension Mother     Lung cancer Father     Heart disease Father     Hyperlipidemia Brother     Hypertension Brother     Heart disease Brother     Diabetes Brother        Social History     Socioeconomic History    Marital status:    Tobacco Use    Smoking status: Never     Passive exposure: Never    Smokeless tobacco: Never   Vaping Use    Vaping status: Never Used   Substance and Sexual Activity    Alcohol use: No    Drug use: No    Sexual activity: Yes     Partners: Male     Birth control/protection: Vasectomy           Objective   Physical Exam  Vitals and nursing note reviewed.   Constitutional:       Appearance: Normal appearance.   HENT:      Head: Normocephalic and atraumatic.      Right Ear: External ear normal.      Left Ear: External ear normal.      Nose: Nose normal.      Mouth/Throat:      Mouth: Mucous membranes are moist.      Pharynx: Oropharynx is clear.   Eyes:      Extraocular Movements: Extraocular movements intact.      Conjunctiva/sclera: Conjunctivae normal.      Pupils: Pupils are equal,  round, and reactive to light.   Cardiovascular:      Rate and Rhythm: Normal rate and regular rhythm.      Pulses: Normal pulses.      Heart sounds: Normal heart sounds.   Pulmonary:      Effort: Pulmonary effort is normal.      Breath sounds: Normal breath sounds.   Abdominal:      General: Bowel sounds are normal.      Palpations: Abdomen is soft.   Musculoskeletal:         General: Normal range of motion.      Cervical back: Normal range of motion and neck supple.   Skin:     General: Skin is warm and dry.      Capillary Refill: Capillary refill takes less than 2 seconds.      Findings: Bruising, laceration and wound present.          Neurological:      General: No focal deficit present.      Mental Status: He is alert.   Psychiatric:         Mood and Affect: Mood normal.         Behavior: Behavior normal.         Thought Content: Thought content normal.         Judgment: Judgment normal.         Laceration Repair    Date/Time: 3/22/2025 8:15 PM    Performed by: Natasha Morocho APRN  Authorized by: Natasha Morocho APRN    Consent:     Consent obtained:  Verbal    Consent given by:  Patient    Risks, benefits, and alternatives were discussed: yes      Risks discussed:  Infection, need for additional repair, nerve damage, poor wound healing, poor cosmetic result, pain, retained foreign body, tendon damage and vascular damage    Alternatives discussed:  Referral, observation, delayed treatment and no treatment  Universal protocol:     Procedure explained and questions answered to patient or proxy's satisfaction: yes      Relevant documents present and verified: yes      Test results available: yes      Imaging studies available: yes      Required blood products, implants, devices, and special equipment available: yes      Site/side marked: yes      Immediately prior to procedure, a time out was called: yes      Patient identity confirmed:  Verbally with patient, arm band, provided demographic data and  "hospital-assigned identification number  Anesthesia:     Anesthesia method:  Local infiltration    Local anesthetic:  Lidocaine 1% WITH epi  Laceration details:     Location:  Face    Face location:  Forehead    Length (cm):  7  Pre-procedure details:     Preparation:  Patient was prepped and draped in usual sterile fashion  Exploration:     Limited defect created (wound extended): no      Hemostasis achieved with:  Direct pressure and epinephrine    Imaging outcome: foreign body not noted      Wound exploration: wound explored through full range of motion and entire depth of wound visualized    Treatment:     Area cleansed with:  Chlorhexidine, saline and Shur-Clens    Amount of cleaning:  Extensive    Irrigation solution:  Sterile saline    Irrigation method:  Syringe  Skin repair:     Repair method:  Sutures    Suture size:  4-0 and 5-0    Suture material:  Plain gut    Suture technique:  Simple interrupted    Number of sutures:  18  Approximation:     Approximation:  Close  Repair type:     Repair type:  Intermediate  Post-procedure details:     Dressing:  Antibiotic ointment and non-adherent dressing    Procedure completion:  Tolerated             ED Course      /84   Pulse 86   Temp 97.1 °F (36.2 °C) (Oral)   Resp 15   Ht 167.6 cm (66\")   Wt 84 kg (185 lb 3 oz)   SpO2 93%   BMI 29.89 kg/m²   Labs Reviewed - No data to display  Medications   lidocaine 1% - EPINEPHrine 1:836700 (XYLOCAINE W/EPI) 1 %-1:508555 injection 20 mL (20 mL Infiltration Given 3/22/25 1949)   bacitracin ointment 0.9 g (0.9 g Topical Given 3/22/25 1949)     No radiology results for the last day                                                   Medical Decision Making  Problems Addressed:  Laceration of eyebrow and forehead, left, initial encounter: complicated acute illness or injury    Risk  OTC drugs.  Prescription drug management.      Patient presents to the ED for the above complaint, underwent the above exam and " workup.    EKG reviewed: Not indicated    Imaging reviewed: Considered however not indicated    Patient was treated with the following medications while in the ED;   Medications   lidocaine 1% - EPINEPHrine 1:668660 (XYLOCAINE W/EPI) 1 %-1:158056 injection 20 mL (20 mL Infiltration Given 3/22/25 1949)   bacitracin ointment 0.9 g (0.9 g Topical Given 3/22/25 1949)     Upon evaluation of patient the area was numbed with epinephrine and lidocaine as per the procedure note above.  It was cleaned by myself appropriately.  I was able to visualize the entire wound base and flushed with 200 mL saline.  No foreign bodies noted or appreciated.  Repaired as above.  Patient was given appropriate care instructions to include washing twice a day with antibacterial soap covering with bacitracin Neosporin or Vaseline or appropriate nonadherent gauze.  Sutures do not have to be removed as he was sutured with got that will dissolve.  Do not pull the sutures.  Follow-up with primary care and watch for signs of infection.  Patient and wife expressed understanding and have no further questions.    Consideration was given for admission, but the patient was stable for outpatient management as patient was ambulatory, nontoxic, stable, and afebrile.  Exam as above.    Disposition: Discussed need to follow-up diagnostics, including incidental findings.  Discharged with instructions to obtain outpatient follow-up with patient's symptoms and findings, with strict return precautions if patient develops new or worsening symptoms.    This document is intended for medical expert use only. Reading of this document by patients and/or patient's family without participating medical staff guidance may result in misinterpretation and unintended morbidity.  Any interpretation of such data is the responsibility of the patient and/or family member responsible for the patient in concert with their primary or specialist providers, not to be left for sources  of online searches such as [employer], [employer] or similar queries. Relying on these approaches to knowledge may result in misinterpretation, misguided goals of care and even death should patients or family members try recommendations outside of the realm of professional medical care in a supervised inpatient environment.     Final diagnoses:   Laceration of eyebrow and forehead, left, initial encounter       ED Disposition  ED Disposition       ED Disposition   Discharge    Condition   Stable    Comment   --               Cherelle Vila, APRN  [address]  [address]  [place] [address] [phone]               Medication List        New Prescriptions      cephalexin 500 MG capsule  Commonly known as: KEFLEX  Take 1 capsule by mouth 4 (Four) Times a Day for 7 days.               Where to Get Your Medications        These medications were sent to [id number] - [place], [place] - [address] AT SEC OF [address] - [phone]  - [phone] FX  [address] [place] [address]      Phone: [phone]   cephalexin 500 MG capsule            Natasha Morocho, APRN  03/23/25 [id number]

## 2025-03-31 ENCOUNTER — HOSPITAL ENCOUNTER (OUTPATIENT)
Dept: CARDIOLOGY | Facility: HOSPITAL | Age: 70
Discharge: HOME | End: 2025-03-31
Admitting: RADIOLOGY
Payer: MEDICARE

## 2025-03-31 DIAGNOSIS — I65.21 ICAO (INTERNAL CAROTID ARTERY OCCLUSION), RIGHT: ICD-10-CM

## 2025-03-31 LAB
BH CV MID RIGHT ICA HIDDEN LRR: 1 CM
BH CV RIGHT CCA HIDDEN LRR: 1 CM/S
BH CV VAS CAROTID RIGHT DISTAL STENT EDV: 28.2 CM/S
BH CV VAS CAROTID RIGHT DISTAL STENT PSV: 80.7 CM/S
BH CV VAS CAROTID RIGHT DISTAL TO STENT NATIVE VESSEL E: 26.7 CM/S
BH CV VAS CAROTID RIGHT DISTAL TO STENT PSV: 74.5 CM/S
BH CV VAS CAROTID RIGHT MID STENT EDV: 30 CM/S
BH CV VAS CAROTID RIGHT MID STENT PSV: 95.9 CM/S
BH CV VAS CAROTID RIGHT PROXIMAL STENT EDV: 27.1 CM/S
BH CV VAS CAROTID RIGHT PROXIMAL STENT PSV: 99.7 CM/S
BH CV VAS CAROTID RIGHT STENT NATIVE VESSEL PROXIMAL EDV: 24.9 CM/S
BH CV VAS CAROTID RIGHT STENT NATIVE VESSEL PROXIMAL PS: 88.2 CM/S
BH CV XLRA MEAS LEFT CAROTID BULB EDV: -26 CM/SEC
BH CV XLRA MEAS LEFT CAROTID BULB PSV: -92.7 CM/SEC
BH CV XLRA MEAS LEFT DIST CCA EDV: -32 CM/SEC
BH CV XLRA MEAS LEFT DIST CCA PSV: -89.1 CM/SEC
BH CV XLRA MEAS LEFT DIST ICA EDV: -35.5 CM/SEC
BH CV XLRA MEAS LEFT DIST ICA PSV: -90.1 CM/SEC
BH CV XLRA MEAS LEFT ICA/CCA RATIO: -1.02
BH CV XLRA MEAS LEFT PROX CCA EDV: 29.1 CM/SEC
BH CV XLRA MEAS LEFT PROX CCA PSV: 94.9 CM/SEC
BH CV XLRA MEAS LEFT PROX ECA EDV: 26.1 CM/SEC
BH CV XLRA MEAS LEFT PROX ECA PSV: 199 CM/SEC
BH CV XLRA MEAS LEFT PROX ICA EDV: -32.9 CM/SEC
BH CV XLRA MEAS LEFT PROX ICA PSV: -97 CM/SEC
BH CV XLRA MEAS LEFT PROX SCLA PSV: -91.8 CM/SEC
BH CV XLRA MEAS LEFT VERTEBRAL A EDV: -9.5 CM/SEC
BH CV XLRA MEAS LEFT VERTEBRAL A PSV: -30.5 CM/SEC
BH CV XLRA MEAS RIGHT DIST CCA EDV: 24.9 CM/SEC
BH CV XLRA MEAS RIGHT DIST CCA PSV: 88.2 CM/SEC
BH CV XLRA MEAS RIGHT DIST ICA EDV: -26.7 CM/SEC
BH CV XLRA MEAS RIGHT DIST ICA PSV: -74.5 CM/SEC
BH CV XLRA MEAS RIGHT ICA/CCA RATIO: -0.84
BH CV XLRA MEAS RIGHT PROX CCA EDV: 20.5 CM/SEC
BH CV XLRA MEAS RIGHT PROX CCA PSV: 85.1 CM/SEC
BH CV XLRA MEAS RIGHT PROX ECA EDV: -20.3 CM/SEC
BH CV XLRA MEAS RIGHT PROX ECA PSV: -134 CM/SEC
BH CV XLRA MEAS RIGHT PROX SCLA PSV: 128 CM/SEC
BH CV XLRA MEAS RIGHT VERTEBRAL A EDV: -13.2 CM/SEC
BH CV XLRA MEAS RIGHT VERTEBRAL A PSV: -35.6 CM/SEC
BH CVPROX RIGHT ICA HIDDEN LRR: 1 CM

## 2025-03-31 PROCEDURE — 93880 EXTRACRANIAL BILAT STUDY: CPT | Performed by: SURGERY

## 2025-03-31 PROCEDURE — 93880 EXTRACRANIAL BILAT STUDY: CPT

## 2025-04-01 NOTE — PROGRESS NOTES
Subjective   Patient ID: Abel Tafoya is a 69 y.o. male is here today for follow-up for   ICAO (internal carotid artery occlusion), right S/P Stent   Duplex carotid doppler done on 3-  No concerns at this time.  History of Present Illness  69 y.o. male who was transferred to [facility] from [facility] after presenting there with acute R MCA stroke. The stroke service accepted the patient and he went straight to the IR suite on arrival for cerebral angiogram where they found a 90% internal carotid artery stenosis and had stent placement with no residual stenosis. He had TNK with an embolus that resolved.  Patient was at work were he was taking a picture of something with his phone and he just slid down the wall and just could not stand up.  He had no true loss of consciousness, no bowel or bladder incontinence, no seizure activity. He has had no history of prior strokes. He does have hypertension and hyperlipidemia for which he takes treatment and has been pretty well controlled. He is prediabetic.  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  He is a non-smoker.  He underwent a right carotid stent on 9/28/2023 with his 6-month carotid Doppler ultrasound on 3/1/2024 showing no restenosis and now he returns with his latest follow-up to review the results.    The following portions of the patient's history were reviewed and updated as appropriate: He  has a past medical history of Allergic, Broken neck, Colon tumor, Deep vein thrombosis, HL (hearing loss), Hyperlipidemia, Hypertension, and Stroke.  He does not have any pertinent problems on file.  He  has a past surgical history that includes Neck surgery; Tumor excision; Colon biopsy; Skin graft; Appendectomy; and Carotid stent (Right).  His family history includes Diabetes in his brother and mother; Heart disease in his brother and father; Hyperlipidemia in his brother; Hypertension in his brother and mother; Lung cancer in his father; Thyroid disease  in his mother.  He  reports that he has never smoked. He has never been exposed to tobacco smoke. He has never used smokeless tobacco. He reports that he does not drink alcohol and does not use drugs.  Current Outpatient Medications   Medication Sig Dispense Refill    amLODIPine (NORVASC) 5 MG tablet TAKE 1 TABLET BY MOUTH DAILY 90 tablet 1    ascorbic acid (VITAMIN C) 100 MG tablet SM VITAMIN C TABS      aspirin 81 MG chewable tablet Chew 1 tablet Daily. 90 tablet 1    atorvastatin (LIPITOR) 80 MG tablet Take 1 tablet by mouth Daily for 180 days. 90 tablet 1    Cholecalciferol 1000 units capsule SM VITAMIN D3 CAPS      clobetasol (TEMOVATE) 0.05 % ointment       fluticasone (FLONASE) 50 MCG/ACT nasal spray USE 2 SPRAYS IN EACH NOSTRIL DAILY AS DIRECTED BY PROVIDER 16 g 4    montelukast (SINGULAIR) 10 MG tablet TAKE 1 TABLET BY MOUTH EVERY NIGHT 90 tablet 1    Multiple Vitamins-Minerals (QC MENS DAILY MULTIVITAMIN) tablet QC MENS DAILY MULTIVITAMIN TABS      polyethylene glycol (MIRALAX) 17 GM/SCOOP powder TAKE AS DIRECTED PER INSTRUCTIONS FOR BOWEL PREP      tamsulosin (FLOMAX) 0.4 MG capsule 24 hr capsule Take 1 capsule by mouth Daily. 30 capsule 11     No current facility-administered medications for this visit.     Current Outpatient Medications on File Prior to Visit   Medication Sig    amLODIPine (NORVASC) 5 MG tablet TAKE 1 TABLET BY MOUTH DAILY    ascorbic acid (VITAMIN C) 100 MG tablet SM VITAMIN C TABS    aspirin 81 MG chewable tablet Chew 1 tablet Daily.    atorvastatin (LIPITOR) 80 MG tablet Take 1 tablet by mouth Daily for 180 days.    Cholecalciferol 1000 units capsule SM VITAMIN D3 CAPS    clobetasol (TEMOVATE) 0.05 % ointment     fluticasone (FLONASE) 50 MCG/ACT nasal spray USE 2 SPRAYS IN EACH NOSTRIL DAILY AS DIRECTED BY PROVIDER    montelukast (SINGULAIR) 10 MG tablet TAKE 1 TABLET BY MOUTH EVERY NIGHT    Multiple Vitamins-Minerals (QC MENS DAILY MULTIVITAMIN) tablet QC MENS DAILY MULTIVITAMIN  "TABS    polyethylene glycol (MIRALAX) 17 GM/SCOOP powder TAKE AS DIRECTED PER INSTRUCTIONS FOR BOWEL PREP    tamsulosin (FLOMAX) 0.4 MG capsule 24 hr capsule Take 1 capsule by mouth Daily.     No current facility-administered medications on file prior to visit.     He has no known allergies..    Review of Systems   Constitutional: Negative.    Neurological: Negative.    All other systems reviewed and are negative.    Objective     Vitals:    04/10/25 1438   BP: 130/70   Pulse: 88   Temp: 98.6 °F (37 °C)   SpO2: 94%   Weight: 84.4 kg (186 lb)   Height: 167 cm (65.75\")     Body mass index is 30.25 kg/m².    Physical Exam  Vitals and nursing note reviewed.   Constitutional:       General: He is not in acute distress.     Appearance: He is obese.   Eyes:      Extraocular Movements: Extraocular movements intact.   Cardiovascular:      Rate and Rhythm: Normal rate.   Pulmonary:      Effort: Pulmonary effort is normal.   Musculoskeletal:         General: Normal range of motion.      Cervical back: Normal range of motion.   Skin:     General: Skin is warm and dry.   Neurological:      General: No focal deficit present.      Mental Status: He is alert and oriented to person, place, and time.      Cranial Nerves: No cranial nerve deficit.      Sensory: No sensory deficit.      Motor: No weakness.      Coordination: Coordination normal.      Gait: Gait normal.     Neurological Exam  Mental Status  Alert. Oriented to person, place, and time.    Cranial Nerves  CN III, IV, VI: Extraocular movements intact bilaterally.    Gait   Normal gait.    Assessment & Plan   Independent Review of Radiographic Studies:      I personally reviewed the images from the following studies.    The carotid Doppler sonogram dated 3/31/2025 was reviewed with the patient and shows a widely patent right ICA origin stent with no intimal hyperplasia or in-stent stenosis seen.  The left carotid remains less than 50% narrowed and antegrade flow is seen in " both vertebral arteries.    Medical Decision Makin-year-old male with right carotid stent placement for an acute right MCA stroke in  with subsequent follow-up showing persistent patency and no in-stent stenosis.  No residual neurologic deficit is reported and the patient is doing well on his Lipitor, aspirin and antihypertensive medications.  He is a non-smoker and reminded to remain such.  He will return in 2 years for a follow-up carotid Doppler ultrasound unless new symptoms arise in the interim.  Diagnoses and all orders for this visit:    1. ICAO (internal carotid artery occlusion), right status post carotid stent placement (Primary)    2. Right sided cerebral hemisphere cerebrovascular accident (CVA)    3. Essential hypertension    4. Mixed hyperlipidemia    Return in about 2 years (around 4/10/2027) for Recheck, Carotid Doppler Ultrasound.     I spent 20 minutes caring for Abel on this date of service. This time includes time spent by me in the following activities: preparing for the visit, reviewing tests, performing a medically appropriate examination and/or evaluation, counseling and educating the patient/family/caregiver, documenting information in the medical record, independently interpreting results and communicating that information with the patient/family/caregiver, ordering test(s), and reviewing a separately obtained history.

## 2025-04-10 ENCOUNTER — OFFICE VISIT (OUTPATIENT)
Dept: NEUROSURGERY | Facility: CLINIC | Age: 70
End: 2025-04-10
Payer: MEDICARE

## 2025-04-10 VITALS
DIASTOLIC BLOOD PRESSURE: 70 MMHG | HEIGHT: 66 IN | OXYGEN SATURATION: 94 % | WEIGHT: 186 LBS | SYSTOLIC BLOOD PRESSURE: 130 MMHG | HEART RATE: 88 BPM | BODY MASS INDEX: 29.89 KG/M2 | TEMPERATURE: 98.6 F

## 2025-04-10 DIAGNOSIS — E78.2 MIXED HYPERLIPIDEMIA: ICD-10-CM

## 2025-04-10 DIAGNOSIS — I65.21 ICAO (INTERNAL CAROTID ARTERY OCCLUSION), RIGHT: Primary | ICD-10-CM

## 2025-04-10 DIAGNOSIS — I63.9 RIGHT SIDED CEREBRAL HEMISPHERE CEREBROVASCULAR ACCIDENT (CVA): ICD-10-CM

## 2025-04-10 DIAGNOSIS — I10 ESSENTIAL HYPERTENSION: ICD-10-CM

## 2025-06-18 RX ORDER — ASPIRIN 81 MG/1
81 TABLET, CHEWABLE ORAL DAILY
Qty: 90 TABLET | Refills: 1 | Status: SHIPPED | OUTPATIENT
Start: 2025-06-18

## 2025-06-19 RX ORDER — AMLODIPINE BESYLATE 5 MG/1
5 TABLET ORAL DAILY
Qty: 90 TABLET | Refills: 1 | Status: SHIPPED | OUTPATIENT
Start: 2025-06-19

## 2025-07-18 ENCOUNTER — OFFICE VISIT (OUTPATIENT)
Dept: FAMILY MEDICINE CLINIC | Facility: CLINIC | Age: 70
End: 2025-07-18
Payer: MEDICARE

## 2025-07-18 ENCOUNTER — LAB (OUTPATIENT)
Dept: LAB | Facility: HOSPITAL | Age: 70
End: 2025-07-18
Payer: MEDICARE

## 2025-07-18 VITALS
TEMPERATURE: 98.2 F | RESPIRATION RATE: 16 BRPM | WEIGHT: 182 LBS | DIASTOLIC BLOOD PRESSURE: 79 MMHG | HEART RATE: 79 BPM | OXYGEN SATURATION: 98 % | HEIGHT: 66 IN | SYSTOLIC BLOOD PRESSURE: 122 MMHG | BODY MASS INDEX: 29.25 KG/M2

## 2025-07-18 DIAGNOSIS — Z00.00 MEDICARE ANNUAL WELLNESS VISIT, INITIAL: Primary | ICD-10-CM

## 2025-07-18 DIAGNOSIS — I10 ESSENTIAL HYPERTENSION: Chronic | ICD-10-CM

## 2025-07-18 DIAGNOSIS — E55.9 VITAMIN D DEFICIENCY: Chronic | ICD-10-CM

## 2025-07-18 DIAGNOSIS — E78.2 MIXED HYPERLIPIDEMIA: Chronic | ICD-10-CM

## 2025-07-18 DIAGNOSIS — I63.511 ACUTE ISCHEMIC RIGHT MCA STROKE: Chronic | ICD-10-CM

## 2025-07-18 DIAGNOSIS — I65.21 ICAO (INTERNAL CAROTID ARTERY OCCLUSION), RIGHT: Chronic | ICD-10-CM

## 2025-07-18 PROBLEM — J01.81 OTHER ACUTE RECURRENT SINUSITIS: Status: RESOLVED | Noted: 2024-11-11 | Resolved: 2025-07-18

## 2025-07-18 PROBLEM — J06.9 VIRAL UPPER RESPIRATORY TRACT INFECTION: Status: RESOLVED | Noted: 2024-10-04 | Resolved: 2025-07-18

## 2025-07-18 LAB
25(OH)D3 SERPL-MCNC: 49 NG/ML (ref 30–100)
ALBUMIN SERPL-MCNC: 4.5 G/DL (ref 3.5–5.2)
ALBUMIN/GLOB SERPL: 1.6 G/DL
ALP SERPL-CCNC: 84 U/L (ref 39–117)
ALT SERPL W P-5'-P-CCNC: 26 U/L (ref 1–41)
ANION GAP SERPL CALCULATED.3IONS-SCNC: 10.5 MMOL/L (ref 5–15)
AST SERPL-CCNC: 27 U/L (ref 1–40)
BILIRUB SERPL-MCNC: 0.5 MG/DL (ref 0–1.2)
BUN SERPL-MCNC: 21 MG/DL (ref 8–23)
BUN/CREAT SERPL: 20.2 (ref 7–25)
CALCIUM SPEC-SCNC: 9.8 MG/DL (ref 8.6–10.5)
CHLORIDE SERPL-SCNC: 103 MMOL/L (ref 98–107)
CHOLEST SERPL-MCNC: 152 MG/DL (ref 0–200)
CO2 SERPL-SCNC: 26.5 MMOL/L (ref 22–29)
CREAT SERPL-MCNC: 1.04 MG/DL (ref 0.76–1.27)
EGFRCR SERPLBLD CKD-EPI 2021: 78.7 ML/MIN/1.73
GLOBULIN UR ELPH-MCNC: 2.9 GM/DL
GLUCOSE SERPL-MCNC: 78 MG/DL (ref 65–99)
HDLC SERPL-MCNC: 46 MG/DL (ref 40–60)
LDLC SERPL CALC-MCNC: 85 MG/DL (ref 0–100)
LDLC/HDLC SERPL: 1.8 {RATIO}
POTASSIUM SERPL-SCNC: 4.6 MMOL/L (ref 3.5–5.2)
PROT SERPL-MCNC: 7.4 G/DL (ref 6–8.5)
SODIUM SERPL-SCNC: 140 MMOL/L (ref 136–145)
TRIGL SERPL-MCNC: 115 MG/DL (ref 0–150)
VLDLC SERPL-MCNC: 21 MG/DL (ref 5–40)

## 2025-07-18 PROCEDURE — 80053 COMPREHEN METABOLIC PANEL: CPT

## 2025-07-18 PROCEDURE — 82306 VITAMIN D 25 HYDROXY: CPT

## 2025-07-18 PROCEDURE — 80061 LIPID PANEL: CPT

## 2025-07-18 PROCEDURE — 36415 COLL VENOUS BLD VENIPUNCTURE: CPT

## 2025-07-18 NOTE — PATIENT INSTRUCTIONS
Milk of magnesia 2 tablespoons mix with at leas 3 oz of prune juice warm them up.   Benefiber is a white powder for any drink.   Water is important.     Follow up on labs.

## 2025-07-21 ENCOUNTER — RESULTS FOLLOW-UP (OUTPATIENT)
Dept: FAMILY MEDICINE CLINIC | Facility: CLINIC | Age: 70
End: 2025-07-21
Payer: MEDICARE

## 2025-07-21 NOTE — LETTER
Abel Tafoya  [address]  [place] [id number]    July 21, 2025     Dear Mr. Tafoya:    Below are the results from your recent visit:    Resulted Orders   Lipid Panel   Result Value Ref Range    Total Cholesterol 152 0 - 200 mg/dL    Triglycerides 115 0 - 150 mg/dL    HDL Cholesterol 46 40 - 60 mg/dL    LDL Cholesterol  85 0 - 100 mg/dL    VLDL Cholesterol 21 5 - 40 mg/dL    LDL/HDL Ratio 1.80    Comprehensive Metabolic Panel   Result Value Ref Range    Glucose 78 65 - 99 mg/dL    BUN 21.0 8.0 - 23.0 mg/dL    Creatinine 1.04 0.76 - 1.27 mg/dL    Sodium 140 136 - 145 mmol/L    Potassium 4.6 3.5 - 5.2 mmol/L    Chloride 103 98 - 107 mmol/L    CO2 26.5 22.0 - 29.0 mmol/L    Calcium 9.8 8.6 - 10.5 mg/dL    Total Protein 7.4 6.0 - 8.5 g/dL    Albumin 4.5 3.5 - 5.2 g/dL    ALT (SGPT) 26 1 - 41 U/L    AST (SGOT) 27 1 - 40 U/L    Alkaline Phosphatase 84 39 - 117 U/L    Total Bilirubin 0.5 0.0 - 1.2 mg/dL    Globulin 2.9 gm/dL    A/G Ratio 1.6 g/dL    BUN/Creatinine Ratio 20.2 7.0 - 25.0    Anion Gap 10.5 5.0 - 15.0 mmol/L    eGFR 78.7 >60.0 mL/min/1.73   Vitamin D,25-Hydroxy   Result Value Ref Range    25 Hydroxy, Vitamin D 49.0 30.0 - 100.0 ng/ml       Copy of your current lab results.  Your vitamin D is in a normal range you would not need to take additional vitamin D.  Your CMP is normal cholesterol is good.    If you have any questions or concerns, please don't hesitate to call.         Sincerely,        SHABBIR Loyd